# Patient Record
Sex: FEMALE | Race: WHITE | Employment: FULL TIME | URBAN - METROPOLITAN AREA
[De-identification: names, ages, dates, MRNs, and addresses within clinical notes are randomized per-mention and may not be internally consistent; named-entity substitution may affect disease eponyms.]

---

## 2017-05-03 PROBLEM — K35.80 ACUTE APPENDICITIS: Status: ACTIVE | Noted: 2017-05-03

## 2017-05-03 PROBLEM — N12 PYELONEPHRITIS: Status: ACTIVE | Noted: 2017-05-03

## 2017-05-04 PROBLEM — R10.84 GENERALIZED ABDOMINAL PAIN: Status: ACTIVE | Noted: 2017-05-04

## 2017-05-04 PROBLEM — K56.7 ILEUS (HCC): Status: ACTIVE | Noted: 2017-05-04

## 2017-05-04 PROBLEM — N39.0 UTI (URINARY TRACT INFECTION): Status: ACTIVE | Noted: 2017-05-04

## 2017-05-04 PROBLEM — N10 ACUTE PYELONEPHRITIS: Status: ACTIVE | Noted: 2017-05-03

## 2018-09-26 PROBLEM — N39.0 UTI (URINARY TRACT INFECTION): Status: RESOLVED | Noted: 2017-05-04 | Resolved: 2018-09-26

## 2019-01-24 ENCOUNTER — HOSPITAL ENCOUNTER (EMERGENCY)
Age: 29
Discharge: HOME | End: 2019-01-24
Attending: EMERGENCY MEDICINE

## 2019-01-24 VITALS
SYSTOLIC BLOOD PRESSURE: 122 MMHG | BODY MASS INDEX: 26.08 KG/M2 | WEIGHT: 152.78 LBS | TEMPERATURE: 97.6 F | HEIGHT: 64 IN | DIASTOLIC BLOOD PRESSURE: 82 MMHG | OXYGEN SATURATION: 100 % | HEART RATE: 83 BPM | RESPIRATION RATE: 16 BRPM

## 2019-01-24 DIAGNOSIS — A59.9 TRICHOMONAL INFECTION: Primary | ICD-10-CM

## 2019-01-24 DIAGNOSIS — N39.0 ACUTE UTI: ICD-10-CM

## 2019-01-24 LAB
BACTERIA WET PREP: ABNORMAL
BACTERIA: ABNORMAL /HPF
BILIRUBIN URINE: NEGATIVE
BLOOD, URINE: NEGATIVE
CLARITY: CLEAR
CLUE CELLS: ABNORMAL
COLOR: YELLOW
EPITHELIAL CELLS WET PREP: ABNORMAL
EPITHELIAL CELLS, UA: ABNORMAL /HPF
GLUCOSE URINE: NEGATIVE MG/DL
HCG(URINE) PREGNANCY TEST: NEGATIVE
KETONES, URINE: NEGATIVE MG/DL
LEUKOCYTE ESTERASE, URINE: ABNORMAL
MICROSCOPIC EXAMINATION: YES
NITRITE, URINE: NEGATIVE
PH UA: 7.5
PROTEIN UA: NEGATIVE MG/DL
RBC UA: ABNORMAL /HPF (ref 0–2)
RBC WET PREP: ABNORMAL
SOURCE WET PREP: ABNORMAL
SPECIFIC GRAVITY UA: 1.01
TRICHOMONAS PREP: ABNORMAL
TRICHOMONAS: PRESENT /HPF
URINE REFLEX TO CULTURE: YES
URINE TYPE: ABNORMAL
UROBILINOGEN, URINE: 0.2 E.U./DL
WBC UA: ABNORMAL /HPF (ref 0–5)
WBC WET PREP: ABNORMAL
YEAST WET PREP: ABNORMAL

## 2019-01-24 PROCEDURE — 87591 N.GONORRHOEAE DNA AMP PROB: CPT

## 2019-01-24 PROCEDURE — 87210 SMEAR WET MOUNT SALINE/INK: CPT

## 2019-01-24 PROCEDURE — 6370000000 HC RX 637 (ALT 250 FOR IP): Performed by: EMERGENCY MEDICINE

## 2019-01-24 PROCEDURE — 87491 CHLMYD TRACH DNA AMP PROBE: CPT

## 2019-01-24 PROCEDURE — 6360000002 HC RX W HCPCS: Performed by: EMERGENCY MEDICINE

## 2019-01-24 PROCEDURE — 81001 URINALYSIS AUTO W/SCOPE: CPT

## 2019-01-24 PROCEDURE — 99283 EMERGENCY DEPT VISIT LOW MDM: CPT

## 2019-01-24 PROCEDURE — 84703 CHORIONIC GONADOTROPIN ASSAY: CPT

## 2019-01-24 PROCEDURE — 87086 URINE CULTURE/COLONY COUNT: CPT

## 2019-01-24 PROCEDURE — 96372 THER/PROPH/DIAG INJ SC/IM: CPT

## 2019-01-24 RX ORDER — NITROFURANTOIN 25; 75 MG/1; MG/1
100 CAPSULE ORAL 2 TIMES DAILY
Qty: 14 CAPSULE | Refills: 0 | Status: SHIPPED | OUTPATIENT
Start: 2019-01-24 | End: 2019-01-31

## 2019-01-24 RX ORDER — CEFTRIAXONE SODIUM 250 MG/1
250 INJECTION, POWDER, FOR SOLUTION INTRAMUSCULAR; INTRAVENOUS ONCE
Status: COMPLETED | OUTPATIENT
Start: 2019-01-24 | End: 2019-01-24

## 2019-01-24 RX ORDER — AZITHROMYCIN 500 MG/1
1000 TABLET, FILM COATED ORAL ONCE
Status: COMPLETED | OUTPATIENT
Start: 2019-01-24 | End: 2019-01-24

## 2019-01-24 RX ORDER — METRONIDAZOLE 500 MG/1
2000 TABLET ORAL ONCE
Status: COMPLETED | OUTPATIENT
Start: 2019-01-24 | End: 2019-01-24

## 2019-01-24 RX ADMIN — AZITHROMYCIN 1000 MG: 500 TABLET, FILM COATED ORAL at 14:46

## 2019-01-24 RX ADMIN — CEFTRIAXONE SODIUM 250 MG: 250 INJECTION, POWDER, FOR SOLUTION INTRAMUSCULAR; INTRAVENOUS at 14:47

## 2019-01-24 RX ADMIN — METRONIDAZOLE 2000 MG: 500 TABLET ORAL at 15:06

## 2019-01-25 LAB
C TRACH DNA GENITAL QL NAA+PROBE: NEGATIVE
N. GONORRHOEAE DNA: NEGATIVE

## 2019-01-26 LAB — URINE CULTURE, ROUTINE: NORMAL

## 2019-03-15 ENCOUNTER — HOSPITAL ENCOUNTER (EMERGENCY)
Age: 29
Discharge: HOME | End: 2019-03-15
Attending: EMERGENCY MEDICINE

## 2019-03-15 VITALS
OXYGEN SATURATION: 98 % | BODY MASS INDEX: 26.2 KG/M2 | SYSTOLIC BLOOD PRESSURE: 133 MMHG | HEIGHT: 64 IN | DIASTOLIC BLOOD PRESSURE: 88 MMHG | HEART RATE: 85 BPM | TEMPERATURE: 98.1 F | WEIGHT: 153.44 LBS | RESPIRATION RATE: 17 BRPM

## 2019-03-15 DIAGNOSIS — J02.9 VIRAL PHARYNGITIS: Primary | ICD-10-CM

## 2019-03-15 LAB — S PYO AG THROAT QL: NEGATIVE

## 2019-03-15 PROCEDURE — 87081 CULTURE SCREEN ONLY: CPT

## 2019-03-15 PROCEDURE — 99283 EMERGENCY DEPT VISIT LOW MDM: CPT

## 2019-03-15 PROCEDURE — 87880 STREP A ASSAY W/OPTIC: CPT

## 2019-03-15 RX ORDER — IBUPROFEN 600 MG/1
600 TABLET ORAL EVERY 8 HOURS PRN
Qty: 30 TABLET | Refills: 0 | Status: SHIPPED | OUTPATIENT
Start: 2019-03-15 | End: 2021-05-05

## 2019-03-15 ASSESSMENT — PAIN SCALES - GENERAL
PAINLEVEL_OUTOF10: 3
PAINLEVEL_OUTOF10: 3

## 2019-03-15 ASSESSMENT — PAIN DESCRIPTION - DESCRIPTORS
DESCRIPTORS: BURNING
DESCRIPTORS: BURNING

## 2019-03-15 ASSESSMENT — PAIN DESCRIPTION - LOCATION
LOCATION: THROAT
LOCATION: THROAT

## 2019-03-15 ASSESSMENT — PAIN DESCRIPTION - PAIN TYPE
TYPE: ACUTE PAIN
TYPE: ACUTE PAIN

## 2019-03-17 LAB — S PYO THROAT QL CULT: NORMAL

## 2019-04-17 ENCOUNTER — APPOINTMENT (OUTPATIENT)
Dept: ULTRASOUND IMAGING | Age: 29
DRG: 446 | End: 2019-04-17

## 2019-04-17 ENCOUNTER — APPOINTMENT (OUTPATIENT)
Dept: CT IMAGING | Age: 29
DRG: 446 | End: 2019-04-17

## 2019-04-17 ENCOUNTER — HOSPITAL ENCOUNTER (INPATIENT)
Age: 29
LOS: 1 days | Discharge: LEFT BEFORE DISCHARGE | DRG: 446 | End: 2019-04-18
Attending: EMERGENCY MEDICINE | Admitting: INTERNAL MEDICINE

## 2019-04-17 DIAGNOSIS — R10.13 ABDOMINAL PAIN, EPIGASTRIC: Primary | ICD-10-CM

## 2019-04-17 DIAGNOSIS — K80.20 CALCULUS OF GALLBLADDER WITHOUT CHOLECYSTITIS WITHOUT OBSTRUCTION: ICD-10-CM

## 2019-04-17 DIAGNOSIS — R10.11 RIGHT UPPER QUADRANT ABDOMINAL PAIN: ICD-10-CM

## 2019-04-17 DIAGNOSIS — R79.89 ELEVATED LFTS: ICD-10-CM

## 2019-04-17 PROBLEM — B19.20 HEPATITIS C: Status: ACTIVE | Noted: 2019-04-17

## 2019-04-17 PROBLEM — R74.01 TRANSAMINITIS: Status: ACTIVE | Noted: 2019-04-17

## 2019-04-17 PROBLEM — Z20.2 POSSIBLE EXPOSURE TO STD: Status: ACTIVE | Noted: 2019-04-17

## 2019-04-17 LAB
A/G RATIO: 1.2 (ref 1.1–2.2)
ALBUMIN SERPL-MCNC: 3.7 G/DL (ref 3.4–5)
ALP BLD-CCNC: 239 U/L (ref 40–129)
ALT SERPL-CCNC: 494 U/L (ref 10–40)
ANION GAP SERPL CALCULATED.3IONS-SCNC: 9 MMOL/L (ref 3–16)
AST SERPL-CCNC: 283 U/L (ref 15–37)
BACTERIA: ABNORMAL /HPF
BASOPHILS ABSOLUTE: 0 K/UL (ref 0–0.2)
BASOPHILS RELATIVE PERCENT: 0.9 %
BILIRUB SERPL-MCNC: 0.8 MG/DL (ref 0–1)
BILIRUBIN URINE: NEGATIVE
BLOOD, URINE: ABNORMAL
BUN BLDV-MCNC: 9 MG/DL (ref 7–20)
CALCIUM SERPL-MCNC: 9 MG/DL (ref 8.3–10.6)
CHLORIDE BLD-SCNC: 108 MMOL/L (ref 99–110)
CLARITY: CLEAR
CO2: 26 MMOL/L (ref 21–32)
COLOR: YELLOW
CREAT SERPL-MCNC: 0.6 MG/DL (ref 0.6–1.1)
EOSINOPHILS ABSOLUTE: 0 K/UL (ref 0–0.6)
EOSINOPHILS RELATIVE PERCENT: 1.2 %
EPITHELIAL CELLS, UA: ABNORMAL /HPF
GFR AFRICAN AMERICAN: >60
GFR NON-AFRICAN AMERICAN: >60
GLOBULIN: 3.2 G/DL
GLUCOSE BLD-MCNC: 110 MG/DL (ref 70–99)
GLUCOSE URINE: NEGATIVE MG/DL
HCG(URINE) PREGNANCY TEST: NEGATIVE
HCT VFR BLD CALC: 39.8 % (ref 36–48)
HEMOGLOBIN: 13.4 G/DL (ref 12–16)
KETONES, URINE: NEGATIVE MG/DL
LEUKOCYTE ESTERASE, URINE: NEGATIVE
LIPASE: 76 U/L (ref 13–60)
LYMPHOCYTES ABSOLUTE: 1.8 K/UL (ref 1–5.1)
LYMPHOCYTES RELATIVE PERCENT: 44.5 %
MCH RBC QN AUTO: 31 PG (ref 26–34)
MCHC RBC AUTO-ENTMCNC: 33.6 G/DL (ref 31–36)
MCV RBC AUTO: 92.4 FL (ref 80–100)
MICROSCOPIC EXAMINATION: YES
MONOCYTES ABSOLUTE: 0.6 K/UL (ref 0–1.3)
MONOCYTES RELATIVE PERCENT: 15.9 %
NEUTROPHILS ABSOLUTE: 1.5 K/UL (ref 1.7–7.7)
NEUTROPHILS RELATIVE PERCENT: 37.5 %
NITRITE, URINE: NEGATIVE
PDW BLD-RTO: 13.7 % (ref 12.4–15.4)
PH UA: 6.5 (ref 5–8)
PLATELET # BLD: 167 K/UL (ref 135–450)
PMV BLD AUTO: 9.6 FL (ref 5–10.5)
POTASSIUM SERPL-SCNC: 3.9 MMOL/L (ref 3.5–5.1)
PROTEIN UA: NEGATIVE MG/DL
RBC # BLD: 4.31 M/UL (ref 4–5.2)
RBC UA: ABNORMAL /HPF (ref 0–2)
SODIUM BLD-SCNC: 143 MMOL/L (ref 136–145)
SPECIFIC GRAVITY UA: 1.01 (ref 1–1.03)
TOTAL PROTEIN: 6.9 G/DL (ref 6.4–8.2)
URINE REFLEX TO CULTURE: ABNORMAL
URINE TYPE: ABNORMAL
UROBILINOGEN, URINE: 0.2 E.U./DL
WBC # BLD: 3.9 K/UL (ref 4–11)
WBC UA: ABNORMAL /HPF (ref 0–5)

## 2019-04-17 PROCEDURE — 74176 CT ABD & PELVIS W/O CONTRAST: CPT

## 2019-04-17 PROCEDURE — 80074 ACUTE HEPATITIS PANEL: CPT

## 2019-04-17 PROCEDURE — 80053 COMPREHEN METABOLIC PANEL: CPT

## 2019-04-17 PROCEDURE — 76705 ECHO EXAM OF ABDOMEN: CPT

## 2019-04-17 PROCEDURE — 81001 URINALYSIS AUTO W/SCOPE: CPT

## 2019-04-17 PROCEDURE — 2580000003 HC RX 258: Performed by: EMERGENCY MEDICINE

## 2019-04-17 PROCEDURE — 96365 THER/PROPH/DIAG IV INF INIT: CPT

## 2019-04-17 PROCEDURE — 85025 COMPLETE CBC W/AUTO DIFF WBC: CPT

## 2019-04-17 PROCEDURE — 84703 CHORIONIC GONADOTROPIN ASSAY: CPT

## 2019-04-17 PROCEDURE — 1200000000 HC SEMI PRIVATE

## 2019-04-17 PROCEDURE — 99285 EMERGENCY DEPT VISIT HI MDM: CPT

## 2019-04-17 PROCEDURE — 87210 SMEAR WET MOUNT SALINE/INK: CPT

## 2019-04-17 PROCEDURE — 6360000002 HC RX W HCPCS: Performed by: EMERGENCY MEDICINE

## 2019-04-17 PROCEDURE — 87491 CHLMYD TRACH DNA AMP PROBE: CPT

## 2019-04-17 PROCEDURE — 87591 N.GONORRHOEAE DNA AMP PROB: CPT

## 2019-04-17 PROCEDURE — 83690 ASSAY OF LIPASE: CPT

## 2019-04-17 PROCEDURE — 36415 COLL VENOUS BLD VENIPUNCTURE: CPT

## 2019-04-17 PROCEDURE — 96375 TX/PRO/DX INJ NEW DRUG ADDON: CPT

## 2019-04-17 RX ORDER — ACETAMINOPHEN 325 MG/1
650 TABLET ORAL EVERY 4 HOURS PRN
Status: DISCONTINUED | OUTPATIENT
Start: 2019-04-17 | End: 2019-04-18 | Stop reason: HOSPADM

## 2019-04-17 RX ORDER — SODIUM CHLORIDE 9 MG/ML
INJECTION, SOLUTION INTRAVENOUS CONTINUOUS
Status: DISCONTINUED | OUTPATIENT
Start: 2019-04-18 | End: 2019-04-18 | Stop reason: HOSPADM

## 2019-04-17 RX ORDER — ONDANSETRON 2 MG/ML
4 INJECTION INTRAMUSCULAR; INTRAVENOUS EVERY 6 HOURS PRN
Status: DISCONTINUED | OUTPATIENT
Start: 2019-04-17 | End: 2019-04-18 | Stop reason: HOSPADM

## 2019-04-17 RX ORDER — MORPHINE SULFATE 2 MG/ML
2 INJECTION, SOLUTION INTRAMUSCULAR; INTRAVENOUS EVERY 4 HOURS PRN
Status: DISCONTINUED | OUTPATIENT
Start: 2019-04-17 | End: 2019-04-18 | Stop reason: HOSPADM

## 2019-04-17 RX ORDER — SODIUM CHLORIDE 0.9 % (FLUSH) 0.9 %
10 SYRINGE (ML) INJECTION EVERY 12 HOURS SCHEDULED
Status: DISCONTINUED | OUTPATIENT
Start: 2019-04-18 | End: 2019-04-18 | Stop reason: HOSPADM

## 2019-04-17 RX ORDER — SODIUM CHLORIDE 0.9 % (FLUSH) 0.9 %
10 SYRINGE (ML) INJECTION PRN
Status: DISCONTINUED | OUTPATIENT
Start: 2019-04-17 | End: 2019-04-18 | Stop reason: HOSPADM

## 2019-04-17 RX ORDER — KETOROLAC TROMETHAMINE 30 MG/ML
30 INJECTION, SOLUTION INTRAMUSCULAR; INTRAVENOUS ONCE
Status: COMPLETED | OUTPATIENT
Start: 2019-04-17 | End: 2019-04-17

## 2019-04-17 RX ADMIN — KETOROLAC TROMETHAMINE 30 MG: 30 INJECTION, SOLUTION INTRAMUSCULAR at 22:12

## 2019-04-17 RX ADMIN — CEFTRIAXONE 1 G: 1 INJECTION, POWDER, FOR SOLUTION INTRAMUSCULAR; INTRAVENOUS at 22:47

## 2019-04-17 ASSESSMENT — PAIN DESCRIPTION - FREQUENCY
FREQUENCY: CONTINUOUS

## 2019-04-17 ASSESSMENT — PAIN - FUNCTIONAL ASSESSMENT
PAIN_FUNCTIONAL_ASSESSMENT: PREVENTS OR INTERFERES SOME ACTIVE ACTIVITIES AND ADLS
PAIN_FUNCTIONAL_ASSESSMENT: PREVENTS OR INTERFERES WITH MANY ACTIVE NOT PASSIVE ACTIVITIES

## 2019-04-17 ASSESSMENT — PAIN DESCRIPTION - PAIN TYPE
TYPE: ACUTE PAIN

## 2019-04-17 ASSESSMENT — PAIN DESCRIPTION - DESCRIPTORS
DESCRIPTORS: SHARP;CONSTANT
DESCRIPTORS: CONSTANT;SHARP
DESCRIPTORS: SHARP
DESCRIPTORS: SHARP;CONSTANT

## 2019-04-17 ASSESSMENT — PAIN DESCRIPTION - LOCATION
LOCATION: ABDOMEN

## 2019-04-17 ASSESSMENT — PAIN SCALES - GENERAL
PAINLEVEL_OUTOF10: 7
PAINLEVEL_OUTOF10: 7
PAINLEVEL_OUTOF10: 6
PAINLEVEL_OUTOF10: 7
PAINLEVEL_OUTOF10: 6

## 2019-04-17 ASSESSMENT — PAIN DESCRIPTION - ORIENTATION
ORIENTATION: MID;UPPER;RIGHT
ORIENTATION: MID
ORIENTATION: MID;UPPER
ORIENTATION: MID;UPPER

## 2019-04-17 ASSESSMENT — ENCOUNTER SYMPTOMS: ABDOMINAL PAIN: 1

## 2019-04-17 NOTE — ED PROVIDER NOTES
[facility]  eMERGENCY dEPARTMENT eNCOUnter      Pt Name: Huma Woodruff  MRN: [id number]  [place] 1990  Date of evaluation: 2019  Provider: Onofre Conklin MD    [address] [place] [address]       Chief Complaint   Patient presents with    Vaginal Bleeding     vaginal spotting noted only after voiding onset yesterday, denies fever,     Abdominal Pain     onset yesterday         HISTORY OF PRESENT ILLNESS  (Location/Symptom, Timing/Onset, Context/Setting, Quality, Duration, Modifying Factors, Severity.)   Huma Woodruff is a 30 y.o. female who presents to the emergency department complaining of abdominal pain. Her abdominal pain is diffuse, somewhat more in the upper abdomen. She's had it for 2 days. Decreased appetite, no nausea or vomiting. No diarrhea or constipation. She denies frequency urgency or dysuria. She notices blood in the toilet when she urinates and blood when she wipes after urinating. Patient is a  AB 2. Her last normal menstrual period ended one week ago. She denies any abnormal discharge or abnormal vaginal bleeding. Nursing Notes were reviewed and I agree. REVIEW OF SYSTEMS    (2-9 systems for level 4, 10 or more for level 5)     Gen.: No fever or chills. ENT: No sore throat or earache. Cardiovascular: No chest pain. Pulmonary: No shortness of breath or cough. GI: Diffuse abdominal pain. No vomiting or diarrhea. No constipation. : Blood when she wipes and blood in the toilet when she urinates. Denies vaginal discharge. Denies abnormal bleeding. Last menstrual period week ago. Except as noted above the remainder of the review of systems was reviewed and negative. PAST MEDICAL HISTORY         Diagnosis Date    Depression     Genital herpes     ist outbreak    STD (sexually transmitted disease)     GC/chlamydia    Substance abuse ([facility] [address]     HX IV heroin.  last use 2016       SURGICAL HISTORY     History reviewed. No pertinent surgical history. CURRENT MEDICATIONS       Previous Medications    IBUPROFEN (ADVIL;MOTRIN) 600 MG TABLET    Take 1 tablet by mouth every 8 hours as needed for Pain       ALLERGIES     Latex; Kiwi extract; and Hydrocodone    FAMILY HISTORY           Problem Relation Age of Onset    Cancer Maternal Aunt      Family Status   Relation Name Status    Macey          SOCIAL HISTORY      reports that she has been smoking cigarettes. She has been smoking about 1.00 pack per day. She has never used smokeless tobacco. She reports that she does not drink alcohol. PHYSICAL EXAM    (up to 7 for level 4, 8 or more for level 5)     ED Triage Vitals   BP Temp Temp src Pulse Resp SpO2 Height Weight   -- -- -- -- -- -- -- --       Gen. : Alert well-appearing female in no acute distress. Head: Atraumatic and normocephalic. Eyes: No conjunctival injection. Pupils equal round reactive. No icterus. ENT: Soumya Pollen is clear. Oropharynx moist without erythema. Neck: Supple without adenopathy, nontender. Heart: Regular rate and rhythm. No murmurs gallops noted. Lungs: Breath sounds equal bilaterally and clear. Abdomen: Soft, nondistended, minimal periumbilical epigastric tenderness. Mild lower abdominal tenderness poorly localized. No guarding or rebound. No masses organomegaly. No CVA tenderness. Past sounds are normal.  Pelvic exam: External genitalia is normal. Vaginal vault contains a moderate amount of thin reddish discharge. The cervix is nontender. The uterus is not enlarged and is mildly tender. There is mild bilateral adnexal tenderness without masses. She has mild cervical motion tenderness. Musculoskeletal: No extremity edema. Skin: Warm and dry, no cyanosis or diaphoresis. No pallor. Neuro: Awake alert oriented. No focal motor deficits. Normal gait.       DIAGNOSTIC RESULTS     RADIOLOGY:   Non-plain film images such as CT, Ultrasound and MRI are read by the radiologist. [facility] radiographic images are visualized and preliminarily interpreted by Rishi Cross MD with the below findings:      Interpretation per the Radiologist below, if available at the time of this note:    CT ABDOMEN PELVIS WO CONTRAST Additional Contrast? None   Final Result   No CT evidence of acute intra-abdominal process.              LABS:  Labs Reviewed   CBC WITH AUTO DIFFERENTIAL - Abnormal; Notable for the following components:       Result Value    WBC 3.9 (*)     Neutrophils # 1.5 (*)     All other components within normal limits    Narrative:     Performed at:  [place] [id number] [facility]   Phone [phone]   COMPREHENSIVE METABOLIC PANEL - Abnormal; Notable for the following components:    Glucose 110 (*)     Alkaline Phosphatase 239 (*)      (*)      (*)     All other components within normal limits    Narrative:     Performed at:  [place] [address] [facility]   Phone [phone]   LIPASE - Abnormal; Notable for the following components:    Lipase 76.0 (*)     All other components within normal limits    Narrative:     Performed at:  [place] [address] [facility]   Phone [phone]   URINE RT REFLEX TO CULTURE - Abnormal; Notable for the following components:    Blood, Urine MODERATE (*)     All other components within normal limits    Narrative:     Performed at:  [facility]  [address] [facility]   Phone [phone]   MICROSCOPIC URINALYSIS - Abnormal; Notable for the following components:    RBC, UA 3-5 (*)     Bacteria, UA 2+ (*)     All other components within normal limits    Narrative:     Performed at:  [place] [address] [facility]   Phone [phone] WET PREP, GENITAL    Narrative:     Performed at:  [facility]  [address] [facility]   Phone [phone]   C.TRACHOMATIS N.GONORRHOEAE DNA   PREGNANCY, URINE    Narrative:     Performed at:  [address] [place] [address] Laboratory  [address] [facility]   Phone [phone]       All other labs were within normal range or not returned as of this dictation. EMERGENCY DEPARTMENT COURSE and DIFFERENTIAL DIAGNOSIS/MDM:   Vitals:    Vitals:    04/17/19 1741   BP: 117/76   Pulse: 79   Resp: 16   Temp: 97 °F (36.1 °C)   TempSrc: Oral   SpO2: 98%   Weight: 147 lb 7.8 oz (66.9 kg)   Height: 5' 4\" (1.626 m)       Differential includes but is not limited to pyelonephritis, cystitis, ureteral lithiasis, ectopic pregnancy, torsion, pelvic inflammatory disease, gastritis, peptic ulcer disease, biliary tract disease, appendicitis, diverticulitis. The patient has some mild upper abdominal discomfort and tenderness. She has a nonsurgical abdomen. She has significant elevations of her ALT and AST with a normal bilirubin. She has a minimal elevation of her lipase. There is no evidence of cholecystitis or pancreatitis on CT scan. She is not actively vomiting. She did tell me that her gynecologist told her that she had liver problems during her pregnancy. She also states that he diagnosed her with hepatitis recently, but she has no idea what type of hepatitis she was diagnosed, and as far she knows she did not have testing done such as a hepatitis profile. The abdominal discomfort she is having is new. I feel she needs an ultrasound done to rule out biliary tract/gallbladder disease. I'm going to send her to [facility] to have this done. I will also have them send a hepatitis profile. If her gallbladder ultrasound is negative I think she can be discharged for close follow-up.  Her abdomen is benign, nonsurgical. She is going to go by car

## 2019-04-18 ENCOUNTER — APPOINTMENT (OUTPATIENT)
Dept: MRI IMAGING | Age: 29
DRG: 446 | End: 2019-04-18

## 2019-04-18 VITALS
BODY MASS INDEX: 25.29 KG/M2 | SYSTOLIC BLOOD PRESSURE: 114 MMHG | HEIGHT: 64 IN | WEIGHT: 148.15 LBS | HEART RATE: 63 BPM | OXYGEN SATURATION: 97 % | RESPIRATION RATE: 16 BRPM | DIASTOLIC BLOOD PRESSURE: 76 MMHG | TEMPERATURE: 97.5 F

## 2019-04-18 LAB
A/G RATIO: 1.1 (ref 1.1–2.2)
ALBUMIN SERPL-MCNC: 3.8 G/DL (ref 3.4–5)
ALP BLD-CCNC: 262 U/L (ref 40–129)
ALT SERPL-CCNC: 418 U/L (ref 10–40)
ANION GAP SERPL CALCULATED.3IONS-SCNC: 10 MMOL/L (ref 3–16)
AST SERPL-CCNC: 224 U/L (ref 15–37)
ATYPICAL LYMPHOCYTE RELATIVE PERCENT: 1 % (ref 0–6)
BACTERIA WET PREP: ABNORMAL
BANDED NEUTROPHILS RELATIVE PERCENT: 1 % (ref 0–7)
BASOPHILS ABSOLUTE: 0 K/UL (ref 0–0.2)
BASOPHILS RELATIVE PERCENT: 1 %
BILIRUB SERPL-MCNC: 0.7 MG/DL (ref 0–1)
BUN BLDV-MCNC: 8 MG/DL (ref 7–20)
C TRACH DNA GENITAL QL NAA+PROBE: NEGATIVE
CALCIUM SERPL-MCNC: 8.5 MG/DL (ref 8.3–10.6)
CHLORIDE BLD-SCNC: 110 MMOL/L (ref 99–110)
CLUE CELLS: ABNORMAL
CO2: 24 MMOL/L (ref 21–32)
CREAT SERPL-MCNC: 0.5 MG/DL (ref 0.6–1.1)
EOSINOPHILS ABSOLUTE: 0 K/UL (ref 0–0.6)
EOSINOPHILS RELATIVE PERCENT: 1 %
EPITHELIAL CELLS WET PREP: ABNORMAL
GFR AFRICAN AMERICAN: >60
GFR NON-AFRICAN AMERICAN: >60
GLOBULIN: 3.4 G/DL
GLUCOSE BLD-MCNC: 91 MG/DL (ref 70–99)
HAV IGM SER IA-ACNC: REACTIVE
HCT VFR BLD CALC: 42.3 % (ref 36–48)
HEMOGLOBIN: 14 G/DL (ref 12–16)
HEPATITIS B CORE IGM ANTIBODY: ABNORMAL
HEPATITIS B SURFACE ANTIGEN INTERPRETATION: ABNORMAL
HEPATITIS C ANTIBODY INTERPRETATION: REACTIVE
INR BLD: 0.98 (ref 0.86–1.14)
LACTIC ACID: 1.1 MMOL/L (ref 0.4–2)
LIPASE: 43 U/L (ref 13–60)
LYMPHOCYTES ABSOLUTE: 1.8 K/UL (ref 1–5.1)
LYMPHOCYTES RELATIVE PERCENT: 44 %
MCH RBC QN AUTO: 31.3 PG (ref 26–34)
MCHC RBC AUTO-ENTMCNC: 33.2 G/DL (ref 31–36)
MCV RBC AUTO: 94.3 FL (ref 80–100)
MONOCYTES ABSOLUTE: 0.7 K/UL (ref 0–1.3)
MONOCYTES RELATIVE PERCENT: 18 %
N. GONORRHOEAE DNA: NEGATIVE
NEUTROPHILS ABSOLUTE: 1.4 K/UL (ref 1.7–7.7)
NEUTROPHILS RELATIVE PERCENT: 34 %
PDW BLD-RTO: 14.5 % (ref 12.4–15.4)
PLATELET # BLD: 153 K/UL (ref 135–450)
PMV BLD AUTO: 10.1 FL (ref 5–10.5)
POTASSIUM REFLEX MAGNESIUM: 4.1 MMOL/L (ref 3.5–5.1)
PROTHROMBIN TIME: 11.2 SEC (ref 9.8–13)
RBC # BLD: 4.48 M/UL (ref 4–5.2)
RBC WET PREP: ABNORMAL
SODIUM BLD-SCNC: 144 MMOL/L (ref 136–145)
SOURCE WET PREP: ABNORMAL
TOTAL PROTEIN: 7.2 G/DL (ref 6.4–8.2)
TRICHOMONAS PREP: ABNORMAL
TRIGL SERPL-MCNC: 139 MG/DL (ref 0–150)
WBC # BLD: 3.9 K/UL (ref 4–11)
WBC WET PREP: ABNORMAL
YEAST WET PREP: ABNORMAL

## 2019-04-18 PROCEDURE — 36415 COLL VENOUS BLD VENIPUNCTURE: CPT

## 2019-04-18 PROCEDURE — 2500000003 HC RX 250 WO HCPCS: Performed by: NURSE PRACTITIONER

## 2019-04-18 PROCEDURE — 6360000002 HC RX W HCPCS: Performed by: NURSE PRACTITIONER

## 2019-04-18 PROCEDURE — 87522 HEPATITIS C REVRS TRNSCRPJ: CPT

## 2019-04-18 PROCEDURE — 83605 ASSAY OF LACTIC ACID: CPT

## 2019-04-18 PROCEDURE — 74183 MRI ABD W/O CNTR FLWD CNTR: CPT

## 2019-04-18 PROCEDURE — 84478 ASSAY OF TRIGLYCERIDES: CPT

## 2019-04-18 PROCEDURE — 80053 COMPREHEN METABOLIC PANEL: CPT

## 2019-04-18 PROCEDURE — A9577 INJ MULTIHANCE: HCPCS | Performed by: FAMILY MEDICINE

## 2019-04-18 PROCEDURE — 85025 COMPLETE CBC W/AUTO DIFF WBC: CPT

## 2019-04-18 PROCEDURE — 83690 ASSAY OF LIPASE: CPT

## 2019-04-18 PROCEDURE — 6360000004 HC RX CONTRAST MEDICATION: Performed by: FAMILY MEDICINE

## 2019-04-18 PROCEDURE — 2580000003 HC RX 258: Performed by: NURSE PRACTITIONER

## 2019-04-18 PROCEDURE — 6370000000 HC RX 637 (ALT 250 FOR IP): Performed by: FAMILY MEDICINE

## 2019-04-18 PROCEDURE — 85610 PROTHROMBIN TIME: CPT

## 2019-04-18 RX ORDER — METRONIDAZOLE 500 MG/1
500 TABLET ORAL EVERY 12 HOURS SCHEDULED
Status: DISCONTINUED | OUTPATIENT
Start: 2019-04-18 | End: 2019-04-18 | Stop reason: HOSPADM

## 2019-04-18 RX ADMIN — MORPHINE SULFATE 2 MG: 2 INJECTION, SOLUTION INTRAMUSCULAR; INTRAVENOUS at 00:27

## 2019-04-18 RX ADMIN — SODIUM CHLORIDE: 9 INJECTION, SOLUTION INTRAVENOUS at 00:28

## 2019-04-18 RX ADMIN — MORPHINE SULFATE 2 MG: 2 INJECTION, SOLUTION INTRAMUSCULAR; INTRAVENOUS at 08:57

## 2019-04-18 RX ADMIN — GADOBENATE DIMEGLUMINE 13 ML: 529 INJECTION, SOLUTION INTRAVENOUS at 10:19

## 2019-04-18 RX ADMIN — Medication 10 ML: at 08:58

## 2019-04-18 RX ADMIN — SODIUM CHLORIDE: 9 INJECTION, SOLUTION INTRAVENOUS at 11:44

## 2019-04-18 RX ADMIN — FAMOTIDINE 20 MG: 10 INJECTION, SOLUTION INTRAVENOUS at 00:27

## 2019-04-18 RX ADMIN — FAMOTIDINE 20 MG: 10 INJECTION, SOLUTION INTRAVENOUS at 08:57

## 2019-04-18 RX ADMIN — METRONIDAZOLE 500 MG: 500 TABLET ORAL at 11:44

## 2019-04-18 RX ADMIN — MORPHINE SULFATE 2 MG: 2 INJECTION, SOLUTION INTRAMUSCULAR; INTRAVENOUS at 13:24

## 2019-04-18 ASSESSMENT — PAIN DESCRIPTION - DESCRIPTORS: DESCRIPTORS: SHARP

## 2019-04-18 ASSESSMENT — PAIN DESCRIPTION - LOCATION
LOCATION: ABDOMEN
LOCATION: ABDOMEN

## 2019-04-18 ASSESSMENT — PAIN SCALES - GENERAL
PAINLEVEL_OUTOF10: 7
PAINLEVEL_OUTOF10: 6
PAINLEVEL_OUTOF10: 5

## 2019-04-18 ASSESSMENT — PAIN DESCRIPTION - ORIENTATION: ORIENTATION: RIGHT;LEFT;MID

## 2019-04-18 ASSESSMENT — PAIN DESCRIPTION - PAIN TYPE
TYPE: ACUTE PAIN
TYPE: ACUTE PAIN

## 2019-04-18 ASSESSMENT — PAIN DESCRIPTION - FREQUENCY: FREQUENCY: CONTINUOUS

## 2019-04-18 NOTE — ED NOTES
Handoff given to [facility] RN to assume care of pt. Pt pain addressed at this time.       Jac Obrien RN  04/17/19 5751

## 2019-04-18 NOTE — H&P
Hospital Medicine History & Physical      PCP: No primary care provider on file. Date of Admission: 4/17/2019    Date of Service: Pt seen/examined on Placed in Observation. Chief Complaint:  Abdominal pain      History Of Present Illness:      30 y.o. female with PMHx of Depression, STD, substance abuse  IV heroine use, last used about a year ago  presented to [facility] With complaint of right upper quadrant abdominal pain and suprapubic pain for the past 48 hours. She denies any nausea vomiting or fever. She denies any vaginal discharge. She does states she had some vaginal spotting. Patient is noted that she was just treated for an STD  In February and has not had any exposure to that partner. Patient is also noted to been diagnosed with hepatitis C. She was told by her OB/GYN when she had her last child this past May that she was hepatitis C. She's had no RNA genotyping or actively seeking treatment. Patient was noted to be an IV heroin abuser for approximately 10 years on and off. Last use was approximately one year ago. She went through formal rehabilitation. Past Medical History:          Diagnosis Date    Depression     Genital herpes 4/62011    ist outbreak    STD (sexually transmitted disease) 2009    GC/chlamydia    Substance abuse ([facility] [address]     HX IV heroin. last use 9/2016       Past Surgical History:      History reviewed. No pertinent surgical history. Medications Prior to Admission:      Prior to Admission medications    Medication Sig Start Date End Date Taking? Authorizing Provider   ibuprofen (ADVIL;MOTRIN) 600 MG tablet Take 1 tablet by mouth every 8 hours as needed for Pain 3/15/19  Yes Samira Richards MD       Allergies:  Latex; Kiwi extract; and Hydrocodone    Social History:      The patient currently lives With her mother and her 6 children patient is unemployed    TOBACCO:   reports that she has been smoking cigarettes. She has been smoking about 1.00 pack per day.  She has never used smokeless tobacco.  ETOH:   reports that she does not drink alcohol. Family History:      Reviewed in detail positive as follows:        Problem Relation Age of Onset    Cancer Maternal Aunt        REVIEW OF SYSTEMS:   Pertinent positives as noted in the HPI. All other systems reviewed and negative. PHYSICAL EXAM PERFORMED:    /76   Pulse 72   Temp 98.4 °F (36.9 °C) (Oral)   Resp 16   Ht 5' 4\" (1.626 m)   Wt 147 lb 7.8 oz (66.9 kg)   LMP 04/03/2019   SpO2 98%   BMI 25.32 kg/m²     General appearance:  No apparent distress, appears stated age and cooperative. HEENT:  Normal cephalic, atraumatic without obvious deformity. Pupils equal, round, and reactive to light. Extra ocular muscles intact. Conjunctivae/corneas clear. Neck: Supple, with full range of motion. No jugular venous distention. Trachea midline. Respiratory:  Normal respiratory effort. Clear to auscultation, bilaterally without Rales/Wheezes/Rhonchi. Cardiovascular:  Regular rate and rhythm without murmurs, rubs or gallops. Abdomen: Soft, Mild tenderness to the right upper quadrant into the right flank region non-distended, without rebound or guarding. Normal bowel sounds. Musculoskeletal:  No clubbing, cyanosis or edema bilaterally. Full range of motion without deformity. Skin: Skin color, texture, turgor normal.  No rashes or lesions. Neurologic:  Neurovascularly intact without any focal sensory/motor deficits.  Cranial nerves: II-XII intact, grossly non-focal.  Psychiatric:  Alert and oriented, thought content appropriate, normal insight  Capillary Refill: Brisk,< 3 seconds   Peripheral Pulses: +2 palpable, equal bilaterally       Labs:     Recent Labs     04/17/19  1750   WBC 3.9*   HGB 13.4   HCT 39.8        Recent Labs     04/17/19  1750      K 3.9      CO2 26   BUN 9   CREATININE 0.6   CALCIUM 9.0     Recent Labs     04/17/19  1750   *   *   BILITOT 0.8   ALKPHOS 239*

## 2019-04-18 NOTE — CONSULTS
Start Date End Date Taking? Authorizing Provider   ibuprofen (ADVIL;MOTRIN) 600 MG tablet Take 1 tablet by mouth every 8 hours as needed for Pain 3/15/19  Yes Fanny Allen MD    Scheduled Meds:   sodium chloride flush  10 mL Intravenous 2 times per day    enoxaparin  40 mg Subcutaneous Daily    famotidine (PEPCID) injection  20 mg Intravenous BID     Continuous Infusions:   sodium chloride 100 mL/hr at 04/18/19 0028     PRN Meds:.sodium chloride flush, magnesium hydroxide, ondansetron, acetaminophen, morphine    Allergies  is allergic to latex; kiwi extract; and hydrocodone. Family History  Reviewed, non contribtory  family history includes Cancer in her maternal aunt. Social History  Reviewed, non contributory   reports that she has been smoking cigarettes. She has been smoking about 1.00 pack per day. She has never used smokeless tobacco. She reports that she does not drink alcohol. Review of Systems:  General Denies any fever or chills  HEENT Denies any diplopia, tinnitus or vertigo  Resp Denies any shortness of breath, cough or wheezing  Cardiac Denies any chest pain, palpitations, claudication or edema  GI Denies any melena, hematochezia, hematemesis or pyrosis   Denies any frequency, urgency, hesitancy or incontinence  Heme Denies bruising or bleeding easily  Endocrine Denies any history of diabetes or thyroid disease  Neuro Denies any focal motor or sensory deficits    OBJECTIVE:   VITALS:  height is 5' 4\" (1.626 m) and weight is 148 lb 2.4 oz (67.2 kg). Her oral temperature is 97.6 °F (36.4 °C). Her blood pressure is 104/63 and her pulse is 56. Her respiration is 16 and oxygen saturation is 98%. CONSTITUTIONAL: Alert and oriented times 3, no acute distress and cooperative to examination with proper mood and affect. SKIN: Skin color, texture, turgor normal. No rashes or lesions. LYMPH: no cervical nodes, no inguinal nodes  HEENT: Head is normocephalic, atraumatic. EOMI, PERRLA.   NECK: Further recommendations to follow.       Electronically signed by SUSAN Gordon CNP on 4/18/2019 at 8:56 AM

## 2019-04-18 NOTE — PROGRESS NOTES
Pt requesting to leave AMA. MD spoke w/ pt about pt's condition and risks of leaving AMA. Pt understands and still wants to leave AMA. AMA paper signed, witnessed, and placed in chart. Charge nurse notified and aware.

## 2019-04-18 NOTE — ED NOTES
Pt provided with a sandwich and drink and instructed that she will be NPO at midnight.       Marcio Moon RN  04/17/19 8121

## 2019-04-18 NOTE — PLAN OF CARE
Problem: Pain:  Goal: Pain level will decrease  Description  Pain level will decrease  4/18/2019 1048 by Blanca Ocasio RN  Outcome: Ongoing     Problem: Pain:  Goal: Control of acute pain  Description  Control of acute pain  4/18/2019 1048 by Blanca Ocasio RN  Outcome: Ongoing     Problem: Pain:  Goal: Control of chronic pain  Description  Control of chronic pain  4/18/2019 1048 by Blanca Ocasio RN  Outcome: Ongoing

## 2019-04-18 NOTE — CARE COORDINATION
INITIAL CASE MANAGEMENT ASSESSMENT    Reviewed chart, met with patient to assess possible discharge needs. Explained Case Management role/services. Living Situation: Patient lives with her mother and 3 children in a house. She reports her children are safe with her mother while she is admitted. ADLs: Independent     DME: None used    PT/OT Recs: None ordered     Active Services: None     Transportation: Active . Reports she will have transportation at discharge. Medications: She was not taking medications prior to admission. No health coverage and in [place] so not eligible for SVdP. Will likely need GoodRx discount card or lucy meds if on high-cost meds. PCP: None. Local community clinic information given. She was interested in one in [place] as her sister lives there. Provided this information although she may not qualify due to home address in [place]. Patient aware and will follow up. HD/PD: N/A    PLAN/COMMENTS: [place] Medicaid office address and phone number provided to determine if eligible for health benefits. Reports a history of drug use but not active. Did not express need for resources. No needs identified at this time. SW/CM provided contact information for patient or family to call with any questions. SW/CM will follow and assist as needed.       Electronically signed by MAINOR Adams on 4/18/2019 at 11:07 AM

## 2019-04-18 NOTE — PROGRESS NOTES
4 Eyes Skin Assessment     The patient is being assess for  Admission    I agree that 2 RN's have performed a thorough Head to Toe Skin Assessment on the patient. ALL assessment sites listed below have been assessed. Areas assessed by both nurses: ***  [x]   Head, Face, and Ears   [x]   Shoulders, Back, and Chest  [x]   Arms, Elbows, and Hands   [x]   Coccyx, Sacrum, and IschIum  [x]   Legs, Feet, and Heels        Does the Patient have Skin Breakdown?   No         Seth Prevention initiated:  NA   Wound Care Orders initiated:  NA      [facility] nurse consulted for Pressure Injury (Stage 3,4, Unstageable, DTI, NWPT, and Complex wounds), New and Established Ostomies:  NA      Nurse 1 eSignature: Electronically signed by Shy Woodruff RN on 4/18/19 at 1:24 AM    **SHARE this note so that the co-signing nurse is able to place an eSignature**    Nurse 2 eSignature: {Esignature:[id number]}

## 2019-04-18 NOTE — PROGRESS NOTES
Hospitalist Progress Note    CC: RUQ abdominal pain      Admit date: 4/17/2019  Days in hospital:  1    Subjective: Pt S/E. Pt denies nausea and vomiting. Still has RUQ abdominal pain. ROS:   Pertinent items are noted in HPI. Objective:    /63   Pulse 56   Temp 97.6 °F (36.4 °C) (Oral)   Resp 16   Ht 5' 4\" (1.626 m)   Wt 148 lb 2.4 oz (67.2 kg)   LMP 04/03/2019   SpO2 98%   BMI 25.43 kg/m²     Gen: alert, NAD  HEENT: NC/AT, moist mucous membranes, no oropharyngeal erythema or exudate  Neck: supple, trachea midline, no anterior cervical or SC LAD  Heart: Normal s1/s2, RRR, no murmurs, gallops, or rubs. Lungs: clear bilaterally, no wheezing, no rales, no rhonchi, no use of accessory muscles  Abd: bowel sounds present, soft, tender in the RUQ, nondistended, no masses  Extrem: No clubbing, cyanosis, no edema  Skin: no rashes or lesions  Psych: A & O x3, affect appropriate  Neuro: grossly intact, moves all four extremities spontaneously. Cap refill: +2 sec    Medications:  Scheduled Meds:   sodium chloride flush  10 mL Intravenous 2 times per day    enoxaparin  40 mg Subcutaneous Daily    famotidine (PEPCID) injection  20 mg Intravenous BID       PRN Meds:  sodium chloride flush, magnesium hydroxide, ondansetron, acetaminophen, morphine    IV:   sodium chloride 100 mL/hr at 04/18/19 0028       No intake or output data in the 24 hours ending 04/18/19 0852    Results:  CBC:   Recent Labs     04/17/19  1750   WBC 3.9*   HGB 13.4   HCT 39.8   MCV 92.4        BMP:   Recent Labs     04/17/19  1750      K 3.9      CO2 26   BUN 9   CREATININE 0.6     Mag: No results for input(s): MAG in the last 72 hours. Phos: No results found for: PHOS  No components found for: GLU    LIVER PROFILE:   Recent Labs     04/17/19  1750   *   *   LIPASE 76.0*   BILITOT 0.8   ALKPHOS 239*     PT/INR: No results for input(s): PROTIME, INR in the last 72 hours.   APTT: No results for input(s): APTT in the last 72 hours. UA:  Recent Labs     04/17/19  1748   COLORU Yellow   PHUR 6.5   WBCUA 3-5   RBCUA 3-5*   BACTERIA 2+*   CLARITYU Clear   SPECGRAV 1.010   LEUKOCYTESUR Negative   UROBILINOGEN 0.2   BILIRUBINUR Negative   BLOODU MODERATE*   GLUCOSEU Negative       Invalid input(s): ABG  Lab Results   Component Value Date    CALCIUM 9.0 04/17/2019       Assessment:    Principal Problem:    RUQ abdominal pain  Active Problems:    Substance abuse ([address]    Smoker    Hepatitis C    Possible exposure to STD    Transaminitis  Resolved Problems:    * No resolved hospital problems. [facility] course: A 31 yo female with H/O substance abuse and IV heroine use, daignosed with HVC, last used about a year ago, admitted with right upper quadrant abdominal pain and suprapubic pain. In the ED fredy LFTs were elevated. RUQ US showed Cholelithiasis without evidence of cholecystitis, 2.4 cm hypoechoic mass in the liver. Plan:  RUQ abdominal pain  transaminitis   - check MRI liver protocol   - Abdominal CT negative for any intra-abdominal process  - Acute hepatitis panel with Hep A IgM and Hep C Ab  - awaiting HCV quantitative   -Surgical consult  -GI consult  -Pain management  - awaiting chlamydia/ghonorrhea  -Patient did receive a dose of Rocephin in the emergency room.  May need a 10 day course of doxycycline if no etiology is determined     Hepatitis C:  - RNA genotyping ordered     History of substance abuse:  -Previous IV heroin user, clean for one year  - Cautious with pain control   Smoker:  -Provide smoking cessation education    Code status:  full  DVT prophylaxis: [x] Lovenox  [] SQ Heparin  [] SCDs because of  [] warfarin/oral direct thrombin inhibitor [] Encourage ambulation      Disposition:  [] Home [] Rehab [] Psych [] SNF  [] LTAC  [] Transfer to ICU  [] Transfer to PCU [] Other: in pt      Electronically signed by Mark Gil DO on 4/18/2019 at 8:52 AM

## 2019-04-18 NOTE — CONSULTS
GASTROENTEROLOGY INPATIENT CONSULTATION      IDENTIFYING DATA/REASON FOR CONSULTATION   PATIENT:  Marquis Spicer  MRN:  [id number]  ADMIT DATE: 4/17/2019  TIME OF EVALUATION: 4/18/2019 9:34 AM  HOSPITAL STAY:   LOS: 1 day     REASON FOR CONSULTATION:  Elevated LFTs    HISTORY OF PRESENT ILLNESS   Marquis Spicer is a 30 y.o. female with a PMH of substance abuse, HCV, STD, depression who presented on 4/17/2019 with diffuse abdominal pain worse in the epigastrum and RUQ. We have been consulted regarding elevated LFTs    Pain occurring for quite some time but became worse Tuesday night. No associated nausea, vomiting or fevers    Dx with Hep C 1 year ago. Hx of IVDU. Last use was 1 year ago. No Hep C treatment to date. No new meds. Took a few tylenol past week for the pain, no heavy use. No ETOH use. CT abd/pel normal.     RUQ US- Cholelithiasis without evidence of cholecystitis.  No evidence of biliary  Dilatation. 2.4 cm hypoechoic structure which appears to arise from the liver near the  kellie hepatis is indeterminate.  Nonemergent MRI of the abdomen with a liver protocol is  recommended. Acute viral hep panel pending       PAST MEDICAL, SURGICAL, FAMILY, and SOCIAL HISTORY     Past Medical History:   Diagnosis Date    Depression     Genital herpes 4/62011    ist outbreak    STD (sexually transmitted disease) 2009    GC/chlamydia    Substance abuse ([facility] [address]     HX IV heroin. last use 9/2016     History reviewed. No pertinent surgical history.   Family History   Problem Relation Age of Onset    Cancer Maternal Aunt      Social History     Socioeconomic History    Marital status: Single     Spouse name: None    Number of children: None    Years of education: None    Highest education level: None   Occupational History    None   Social Needs    Financial resource strain: None    Food insecurity:     Worry: None     Inability: None    Transportation needs:     Medical: None Non-medical: None   Tobacco Use    Smoking status: Current Every Day Smoker     Packs/day: 1.00     Types: Cigarettes    Smokeless tobacco: Never Used   Substance and Sexual Activity    Alcohol use: No    Drug use: None     Comment: herion  last taken october 2016    Sexual activity: Yes     Partners: Male   Lifestyle    Physical activity:     Days per week: None     Minutes per session: None    Stress: None   Relationships    Social connections:     Talks on phone: None     Gets together: None     Attends Evangelical service: None     Active member of club or organization: None     Attends meetings of clubs or organizations: None     Relationship status: None    Intimate partner violence:     Fear of current or ex partner: None     Emotionally abused: None     Physically abused: None     Forced sexual activity: None   Other Topics Concern    None   Social History Narrative    None       MEDICATIONS   SCHEDULED:    sodium chloride flush 10 mL 2 times per day   enoxaparin 40 mg Daily   famotidine (PEPCID) injection 20 mg BID     FLUIDS/DRIPS:     sodium chloride 100 mL/hr at 04/18/19 0028     PRNs:   sodium chloride flush 10 mL PRN   magnesium hydroxide 30 mL Daily PRN   ondansetron 4 mg Q6H PRN   acetaminophen 650 mg Q4H PRN   morphine 2 mg Q4H PRN     ALLERGIES:  She Allergies   Allergen Reactions    Latex Rash    Kiwi Extract Swelling     Throat swells up    Hydrocodone Itching       REVIEW OF SYSTEMS   Pertinent ROS noted in HPI    PHYSICAL EXAM   [unfilled]   No intake/output data recorded.       Physical Exam:  Gen: Resting in bed, NAD   CV: RRR no MRG   Pul: CTAB   Abd: Good bowel sounds throughout, no scars, soft, NT/ND, no masses, no HSM   Ext: No edema   Neuro: No asterixis   Skin: No jaundice, spider angiomas, dave erythema      LABS AND IMAGING     Recent Results (from the past 24 hour(s))   Urine reflex to culture    Collection Time: 04/17/19  5:48 PM   Result Value Ref Range    Color, UA Yellow Straw/Yellow    Clarity, UA Clear Clear    Glucose, Ur Negative Negative mg/dL    Bilirubin Urine Negative Negative    Ketones, Urine Negative Negative mg/dL    Specific Gravity, UA 1.010 1.005 - 1.030    Blood, Urine MODERATE (A) Negative    pH, UA 6.5 5.0 - 8.0    Protein, UA Negative Negative mg/dL    Urobilinogen, Urine 0.2 <2.0 E.U./dL    Nitrite, Urine Negative Negative    Leukocyte Esterase, Urine Negative Negative    Microscopic Examination YES     Urine Reflex to Culture Not Indicated     Urine Type Voided    Pregnancy, Urine    Collection Time: 04/17/19  5:48 PM   Result Value Ref Range    HCG(Urine) Pregnancy Test Negative Detects HCG level >20 MIU/mL   Microscopic Urinalysis    Collection Time: 04/17/19  5:48 PM   Result Value Ref Range    WBC, UA 3-5 0 - 5 /HPF    RBC, UA 3-5 (A) 0 - 2 /HPF    Epi Cells 5-10 /HPF    Bacteria, UA 2+ (A) /HPF   CBC Auto Differential    Collection Time: 04/17/19  5:50 PM   Result Value Ref Range    WBC 3.9 (L) 4.0 - 11.0 K/uL    RBC 4.31 4.00 - 5.20 M/uL    Hemoglobin 13.4 12.0 - 16.0 g/dL    Hematocrit 39.8 36.0 - 48.0 %    MCV 92.4 80.0 - 100.0 fL    MCH 31.0 26.0 - 34.0 pg    MCHC 33.6 31.0 - 36.0 g/dL    RDW 13.7 12.4 - 15.4 %    Platelets 016 499 - 951 K/uL    MPV 9.6 5.0 - 10.5 fL    Neutrophils % 37.5 %    Lymphocytes % 44.5 %    Monocytes % 15.9 %    Eosinophils % 1.2 %    Basophils % 0.9 %    Neutrophils # 1.5 (L) 1.7 - 7.7 K/uL    Lymphocytes # 1.8 1.0 - 5.1 K/uL    Monocytes # 0.6 0.0 - 1.3 K/uL    Eosinophils # 0.0 0.0 - 0.6 K/uL    Basophils # 0.0 0.0 - 0.2 K/uL   Comprehensive Metabolic Panel    Collection Time: 04/17/19  5:50 PM   Result Value Ref Range    Sodium 143 136 - 145 mmol/L    Potassium 3.9 3.5 - 5.1 mmol/L    Chloride 108 99 - 110 mmol/L    CO2 26 21 - 32 mmol/L    Anion Gap 9 3 - 16    Glucose 110 (H) 70 - 99 mg/dL    BUN 9 7 - 20 mg/dL    CREATININE 0.6 0.6 - 1.1 mg/dL    GFR Non-African American >60 >60    GFR  >60 >60 recommend outpt follow up to discuss eradication therapy. If you have any questions or need any further information, please feel free to contact our consult team.  Thank you for allowing us to participate in the care of Alejandro Ford.     Kori Leach PA-C

## 2019-04-18 NOTE — ED NOTES
Pt off unit at [address].       Charley Metzger RN  04/17/19 2023

## 2019-04-18 NOTE — PROGRESS NOTES
Pharmacy Medication Reconciliation Note     List of medications patient is currently taking is complete. Source of information:   1. Conversation with patient at bedside. 2. Epic records. ALLERGY  Latex; Kiwi extract; and Hydrocodone    Notes regarding home medications:   1. Patient reports taking her home medication today prior to arrival to ED. 2. Patient denies any OTC or herbal medication use.     Vivian Magallanes, Pharmacy Intern  4/17/2019  10:01 PM

## 2019-04-18 NOTE — ED PROVIDER NOTES
[facility]  eMERGENCY dEPARTMENT eNCOUnter        Pt Name: Arnav Nunez  MRN: [id number]  [place] 1990  Date of evaluation: 4/17/2019  Provider: SUSAN Zuluaga CNP  PCP: No primary care provider on file. This patient was seen and evaluated by the attending physician [address]       Chief Complaint   Patient presents with    Vaginal Bleeding     vaginal spotting noted only after voiding onset yesterday, denies fever,     Abdominal Pain     onset yesterday       HISTORY OF PRESENT ILLNESS   (Location/Symptom, Timing/Onset, Context/Setting, Quality, Duration, Modifying Factors, Severity)  Note limiting factors. Arnav Nunez is a 30 y.o. female that presents the ED today from a [facility] ER. She was seen by Dr. Byron Salter earlier at [facility] ED, they do not have ultrasound capabilities at that site and therefore he sent her here to get an ultrasound of her right upper quadrant and acute hepatitis panel drawn as she has elevated LFTs. She was complaining of allover abdominal pain and more concentration of the abdominal pain in the right upper quadrant. She states that she is uncertain if she has a history of hepatitis, was told that she had hepatitis based off of symptoms previously from a primary care provider. She has not followed up with a gastroenterologist.  Upon arrival here she is walking around her room, in no acute distress. No complaints. Right upper quadrant abdominal ultrasound and acute hepatitis panel were ordered upon her arrival to the ED. Nursing Notes were all reviewed and agreed with or any disagreements were addressed  in the HPI. REVIEW OF SYSTEMS    (2-9 systems for level 4, 10 or more for level 5)     Review of Systems   Gastrointestinal: Positive for abdominal pain. All other systems reviewed and are negative. Positives and Pertinent negatives as per HPI.   Except as noted abovein the ROS, all other systems were reviewed and negative. PAST MEDICAL HISTORY     Past Medical History:   Diagnosis Date    Depression     Genital herpes 4/62011    ist outbreak    STD (sexually transmitted disease) 2009    GC/chlamydia    Substance abuse ([facility] [address]     HX IV heroin. last use 9/2016         SURGICAL HISTORY   History reviewed. No pertinent surgical history. CURRENTMEDICATIONS       Previous Medications    IBUPROFEN (ADVIL;MOTRIN) 600 MG TABLET    Take 1 tablet by mouth every 8 hours as needed for Pain         ALLERGIES     Latex;  Kiwi extract; and Hydrocodone    FAMILYHISTORY       Family History   Problem Relation Age of Onset    Cancer Maternal Aunt           SOCIAL HISTORY       Social History     Socioeconomic History    Marital status: Single     Spouse name: None    Number of children: None    Years of education: None    Highest education level: None   Occupational History    None   Social Needs    Financial resource strain: None    Food insecurity:     Worry: None     Inability: None    Transportation needs:     Medical: None     Non-medical: None   Tobacco Use    Smoking status: Current Every Day Smoker     Packs/day: 1.00     Types: Cigarettes    Smokeless tobacco: Never Used   Substance and Sexual Activity    Alcohol use: No    Drug use: None     Comment: herion  last taken october 2016    Sexual activity: Yes     Partners: Male   Lifestyle    Physical activity:     Days per week: None     Minutes per session: None    Stress: None   Relationships    Social connections:     Talks on phone: None     Gets together: None     Attends Scientologist service: None     Active member of club or organization: None     Attends meetings of clubs or organizations: None     Relationship status: None    Intimate partner violence:     Fear of current or ex partner: None     Emotionally abused: None     Physically abused: None     Forced sexual activity: None   Other disoriented. No cranial nerve deficit or sensory deficit. She displays a negative Romberg sign. GCS eye subscore is 4. GCS verbal subscore is 5. GCS motor subscore is 6. Skin: Skin is warm, dry and intact. Capillary refill takes 2 to 3 seconds. No rash noted. She is not diaphoretic. No erythema. No pallor. Psychiatric: She has a normal mood and affect. Her speech is normal and behavior is normal. Judgment and thought content normal. Cognition and memory are normal.   Nursing note and vitals reviewed.       DIAGNOSTIC RESULTS   LABS:    Labs Reviewed   CBC WITH AUTO DIFFERENTIAL - Abnormal; Notable for the following components:       Result Value    WBC 3.9 (*)     Neutrophils # 1.5 (*)     All other components within normal limits    Narrative:     Performed at:  [place] [address] [address] [facility]   Phone [phone]   COMPREHENSIVE METABOLIC PANEL - Abnormal; Notable for the following components:    Glucose 110 (*)     Alkaline Phosphatase 239 (*)      (*)      (*)     All other components within normal limits    Narrative:     Performed at:  [place] [address] [address] [facility]   Phone [phone]   LIPASE - Abnormal; Notable for the following components:    Lipase 76.0 (*)     All other components within normal limits    Narrative:     Performed at:  [place] [address] [address] [facility]   Phone [phone]   URINE RT REFLEX TO CULTURE - Abnormal; Notable for the following components:    Blood, Urine MODERATE (*)     All other components within normal limits    Narrative:     Performed at:  [place] [address] [address] [facility]   Phone [phone]   MICROSCOPIC URINALYSIS - Abnormal; Notable for the following components:    RBC, UA 3-5 (*)     Bacteria, UA 2+ (*) All other components within normal limits    Narrative:     Performed at:  [facility]) - [facility]  [address],  [place]   Phone [phone]   WET PREP, GENITAL    Narrative:     Performed at:  [address] [place] [address]  [address],  [facility]   Phone [phone]   PREGNANCY, URINE    Narrative:     Performed at:  [facility]) - [facility]  [address],  [facility]   Phone [phone]   HEPATITIS PANEL, ACUTE       All other labs were within normal range or not returned as of this dictation. EKG: All EKG's are interpreted by the Emergency Department Physician who either signs orCo-signs this chart in the absence of a cardiologist.  Please see their note for interpretation of EKG. RADIOLOGY:   Non-plain film images such as CT, Ultrasound and MRI are read by the radiologist. Plain radiographic images are visualized andpreliminarily interpreted by the  ED Provider with the below findings:        Interpretation perthe Radiologist below, if available at the time of this note:    [address]   Final Result   Cholelithiasis without evidence of cholecystitis. No evidence of biliary   dilatation. 2.4 cm hypoechoic structure which appears to arise from the liver near the   kellie hepatis is indeterminate. Color flow is demonstrated within this   structure. Nonemergent MRI of the abdomen with a liver protocol is   recommended. CT ABDOMEN PELVIS WO CONTRAST Additional Contrast? None   Final Result   No CT evidence of acute intra-abdominal process.            Ct Abdomen Pelvis Wo Contrast Additional Contrast? None    Result Date: 4/17/2019  EXAMINATION: CT OF THE ABDOMEN AND PELVIS WITHOUT CONTRAST 4/17/2019 6:25 pm TECHNIQUE: CT of the abdomen and pelvis was performed without the administration of intravenous contrast. Multiplanar reformatted images are provided for review. Dose modulation, iterative reconstruction, and/or weight based adjustment of the mA/kV was utilized to reduce the radiation dose to as low as reasonably achievable. COMPARISON: 05/03/2017 HISTORY: ORDERING SYSTEM PROVIDED HISTORY: Upper abd pain TECHNOLOGIST PROVIDED HISTORY: Additional Contrast?->None Ordering Physician Provided Reason for Exam: pt has pain in her entire abd for the last 2 days. denies vomiting or diarrhea Acuity: Acute Type of Exam: Initial Relevant Medical/Surgical History: denies FINDINGS: Lower Chest: Visualized portions of the lower thorax are unremarkable. Organs: Unenhanced liver, spleen, pancreas, adrenal glands, and kidneys are unremarkable. No radiodense gallstones. GI/Bowel: No bowel obstruction. Normal appendix. Pelvis: Urinary bladder and uterus appear unremarkable. Peritoneum/Retroperitoneum: Trace fluid in the pelvis is likely physiologic. No free air or lymphadenopathy. Bones/Soft Tissues: Osseous structures appear unremarkable. No CT evidence of acute intra-abdominal process.          PROCEDURES   Unless otherwise noted below, none     Procedures    CRITICAL CARE TIME   N/A    CONSULTS:  IP CONSULT TO GENERAL SURGERY  IP CONSULT TO GI      EMERGENCY DEPARTMENT COURSE and DIFFERENTIALDIAGNOSIS/MDM:   Vitals:    Vitals:    04/17/19 1741 04/17/19 1900 04/17/19 2215 04/17/19 2246   BP: 117/76 (!) 130/91 135/86 128/76   Pulse: 79 80 60 72   Resp: 16 16 16 16   Temp: 97 °F (36.1 °C) 98.2 °F (36.8 °C) 97.5 °F (36.4 °C) 98.4 °F (36.9 °C)   TempSrc: Oral Oral Oral Oral   SpO2: 98% 100% 98% 98%   Weight: 147 lb 7.8 oz (66.9 kg)      Height: 5' 4\" (1.626 m)          Patient was given thefollowing medications:  Medications   cefTRIAXone (ROCEPHIN) 1 g IVPB in 50 mL D5W minibag (1 g Intravenous New Bag 4/17/19 2247)   ketorolac (TORADOL) injection 30 mg (30 mg Intravenous Given 4/17/19 2212)       MDM: Patient was seen and evaluated per myself in conjunction with ED attending Dr. Ganga Russo. See HPI and above for full presentation and physical exam.  Patient is a 56-year-old female that presents the ED today with complaints of right upper quadrant abdominal pain for the past 2-3 days. She was sent here from [facility] ED which is a freestanding ER that does not have Capabilities. They Did Do Blood Work and a CT of the Abdomen; blood work as above. Blood Work Showed Elevated LFTs. CT of the Abdomen Was Benign. I Did Order a Right Upper Quadrant Ultrasound and Acute Hepatitis Panel Here in the ED. Differential diagnoses include cholecystitis, hepatitis, GERD, cholelithiasis. Ultrasound of the right upper quadrant shows cholelithiasis without evidence of cholecystitis. No evidence of biliary dilation. There is however a 2.4 cm hypo-echoic structure which appears to arise from the liver. Color flow is demonstrated within the structure. Given the above findings I did consult both general surgery and GI. I received a call back from Dr. Sona Ortiz for general surgery and Dr. bean from GI. They agree to follow patient on an inpatient basis. Therefore hospitalist was consult and who agreed to admit patient and write orders for admission. FINAL IMPRESSION      1. Abdominal pain, epigastric    2. Calculus of gallbladder without cholecystitis without obstruction    3. Right upper quadrant abdominal pain    4. Elevated LFTs          DISPOSITION/PLAN   DISPOSITION  04/17/2019 10:37:51 PM      PATIENT REFERREDTO:  Fernando Bustos MD  [address] [facility]    Schedule an appointment as soon as possible for a visit in 3 days  Call for the first available appointment next week, does not have to be with Dr. Sona Ortiz as he will be on vacation. The number above is for the general office.       DISCHARGE MEDICATIONS:  New Prescriptions    No medications on file       DISCONTINUED MEDICATIONS:  Discontinued Medications    BENZOCAINE-MENTHOL (CEPACOL SORE THROAT) 15-2.6 MG LOZG LOZENGE    Take 1 lozenge by mouth every 2 hours as needed for Sore Throat              (Please note that portions ofthis note were completed with a voice recognition program.  Efforts were made to edit the dictations but occasionally words are mis-transcribed.)    SUSAN Cole CNP (electronically signed)            SUSAN Cole CNP  04/17/19 5542

## 2019-04-18 NOTE — ED NOTES
Pt A&O to ED from [facility] ED. Pt states that her abd pain started two days ago. The area in her upper right abd is tender to touch. Pt also reports vaginal spotting and that she ended her menstrual cycle a week ago. Pt denies nausea at this time.       Viviana Lawler RN  04/17/19 5560

## 2019-04-19 NOTE — DISCHARGE SUMMARY
Hospital Medicine Discharge Summary    Patient: Rachel Cherry     Gender: female  : 1990   Age: 30 y.o. MRN: [id number]    Code Status: Prior full    Primary Care Provider: No primary care provider on file. Admit Date: 2019   Discharge Date: 2019      Admitting Physician: Alexandra Sheriff MD  Discharge Physician: Hannah Vera DO     Discharge Diagnoses: Active Hospital Problems    Diagnosis Date Noted    RUQ abdominal pain [R10.11] 2019    Hepatitis C [B19.20] 2019    Possible exposure to STD [Z20.2] 2019    Transaminitis [R74.0] 2019    Smoker [F17.200] 2011    Substance abuse ([facility] [address] [F19.10]        Hospital Course: A 29 yo female with H/O substance abuse and IV heroine use, daignosed with HVC, last used about a year ago, admitted with right upper quadrant abdominal pain and suprapubic pain. In the ED fredy LFTs were elevated. RUQ US showed Cholelithiasis without evidence of cholecystitis, 2.4 cm hypoechoic mass in the liver. the next day the Pt left AMA. She was alert and oriented x4. I explained that we need to keep her in pt to make sure her LFts would normalize but she refused despite the potential consequences of liver failure. Plan:  RUQ abdominal pain, acute hepatitis A   transaminitis   - check MRI liver protocol -   1. Likely kellie hepatis lymph node as measured above, etiology is   nonspecific, possibly reactive.  Consider follow-up contrast-enhanced   abdominal MRI or CT 6 months to reassess. 2. Cholelithiasis as described.  Mild anterior gallbladder wall thickening.    3. Borderline splenomegaly.           - Abdominal CT negative for any intra-abdominal process  - Acute hepatitis panel with Hep A IgM and Hep C Ab  - awaiting HCV quantitative   -GI consulted  -Pain management  -  chlamydia/ghonorrhea -negative     Hepatitis C:  - RNA genotyping ordered     History of substance abuse:  -Previous IV heroin user, clean for one year  - Cautious with pain control   Smoker:  -Provide smoking cessation education    Disposition:  AMA    Exam:     /76   Pulse 63   Temp 97.5 °F (36.4 °C) (Oral)   Resp 16   Ht 5' 4\" (1.626 m)   Wt 148 lb 2.4 oz (67.2 kg)   LMP 04/03/2019   SpO2 97%   BMI 25.43 kg/m²     General appearance:  No apparent distress, appears stated age and cooperative. HEENT:  Normal cephalic, atraumatic without obvious deformity. Pupils equal, round, and reactive to light. Extra ocular muscles intact. Conjunctivae/corneas clear. Neck: Supple, with full range of motion. No jugular venous distention. Trachea midline. Respiratory:  Normal respiratory effort. Clear to auscultation, bilaterally without Rales/Wheezes/Rhonchi. Cardiovascular:  Regular rate and rhythm with normal S1/S2 without murmurs, rubs or gallops. Abdomen: Soft, non-tender, non-distended with normal bowel sounds. Musculoskeletal:  No clubbing, cyanosis or edema bilaterally. Full range of motion without deformity. Skin: Skin color, texture, turgor normal.  No rashes or lesions. Neurologic:  Neurovascularly intact without any focal sensory/motor deficits. Cranial nerves: II-XII intact, grossly non-focal.  Psychiatric:  Alert and oriented, thought content appropriate, normal insight    Consults:     IP CONSULT TO GENERAL SURGERY  IP CONSULT TO GI    Labs:  For convenience and continuity at follow-up the following most recent labs are provided:    Lab Results   Component Value Date    WBC 3.9 04/18/2019    HGB 14.0 04/18/2019    HCT 42.3 04/18/2019    MCV 94.3 04/18/2019     04/18/2019     04/18/2019    K 4.1 04/18/2019     04/18/2019    CO2 24 04/18/2019    BUN 8 04/18/2019    CREATININE 0.5 04/18/2019    CALCIUM 8.5 04/18/2019    ALKPHOS 262 04/18/2019     04/18/2019     04/18/2019    BILITOT 0.7 04/18/2019    LABALBU 3.8 04/18/2019    TRIG 139 04/18/2019     Lab Results   Component Value Date    INR 0.98 04/18/2019 have any questions or concerns please feel free to contact me at [phone].

## 2019-04-22 LAB
HCV QNT BY NAAT IU/ML: ABNORMAL
HCV QNT BY NAAT LOG IU/ML: 3.82 LOG IU/ML
INTERPRETATION: DETECTED

## 2021-01-10 ENCOUNTER — HOSPITAL ENCOUNTER (EMERGENCY)
Age: 31
Discharge: HOME | End: 2021-01-10
Attending: EMERGENCY MEDICINE
Payer: COMMERCIAL

## 2021-01-10 VITALS
OXYGEN SATURATION: 100 % | HEIGHT: 64 IN | HEART RATE: 79 BPM | TEMPERATURE: 98 F | BODY MASS INDEX: 24.54 KG/M2 | RESPIRATION RATE: 18 BRPM | WEIGHT: 143.74 LBS | DIASTOLIC BLOOD PRESSURE: 72 MMHG | SYSTOLIC BLOOD PRESSURE: 123 MMHG

## 2021-01-10 DIAGNOSIS — R10.2 SUPRAPUBIC ABDOMINAL PAIN: Primary | ICD-10-CM

## 2021-01-10 LAB
BILIRUBIN URINE: NEGATIVE
BLOOD, URINE: NEGATIVE
CLARITY: NORMAL
COLOR: YELLOW
GLUCOSE URINE: NEGATIVE MG/DL
HCG(URINE) PREGNANCY TEST: NEGATIVE
KETONES, URINE: NEGATIVE MG/DL
LEUKOCYTE ESTERASE, URINE: NEGATIVE
MICROSCOPIC EXAMINATION: NORMAL
NITRITE, URINE: NEGATIVE
PH UA: 5.5 (ref 5–8)
PROTEIN UA: NEGATIVE MG/DL
SPECIFIC GRAVITY UA: <=1.005 (ref 1–1.03)
URINE TYPE: NORMAL
UROBILINOGEN, URINE: 0.2 E.U./DL

## 2021-01-10 PROCEDURE — 84703 CHORIONIC GONADOTROPIN ASSAY: CPT

## 2021-01-10 PROCEDURE — 81003 URINALYSIS AUTO W/O SCOPE: CPT

## 2021-01-10 PROCEDURE — 99284 EMERGENCY DEPT VISIT MOD MDM: CPT

## 2021-01-10 ASSESSMENT — PAIN DESCRIPTION - ONSET
ONSET: ON-GOING
ONSET: ON-GOING

## 2021-01-10 ASSESSMENT — PAIN DESCRIPTION - PAIN TYPE
TYPE: ACUTE PAIN
TYPE: ACUTE PAIN

## 2021-01-10 ASSESSMENT — PAIN DESCRIPTION - LOCATION: LOCATION: PELVIS;VAGINA

## 2021-01-10 ASSESSMENT — PAIN DESCRIPTION - ORIENTATION: ORIENTATION: LOWER

## 2021-01-10 ASSESSMENT — PAIN DESCRIPTION - PROGRESSION
CLINICAL_PROGRESSION: NOT CHANGED
CLINICAL_PROGRESSION: GRADUALLY WORSENING

## 2021-01-10 ASSESSMENT — PAIN - FUNCTIONAL ASSESSMENT
PAIN_FUNCTIONAL_ASSESSMENT: PREVENTS OR INTERFERES SOME ACTIVE ACTIVITIES AND ADLS
PAIN_FUNCTIONAL_ASSESSMENT: 0-10
PAIN_FUNCTIONAL_ASSESSMENT: PREVENTS OR INTERFERES SOME ACTIVE ACTIVITIES AND ADLS

## 2021-01-10 ASSESSMENT — PAIN DESCRIPTION - DESCRIPTORS
DESCRIPTORS: ACHING;BURNING
DESCRIPTORS: BURNING

## 2021-01-10 ASSESSMENT — PAIN SCALES - GENERAL: PAINLEVEL_OUTOF10: 8

## 2021-01-10 NOTE — ED PROVIDER NOTES
[address] ENCOUNTER      Pt Name: Zain Tang  MRN: [id number]  [place] 1990  Date of evaluation: 1/10/2021  Provider: Hima Salmeron MD    CHIEF COMPLAINT       Chief Complaint   Patient presents with    Dysuria     pain and burning with urination onset yesterday-          HISTORY OF PRESENT ILLNESS   (Location/Symptom, Timing/Onset,Context/Setting, Quality, Duration, Modifying Factors, Severity)  Note limiting factors. Zain Tang is a 35 y.o. female who presents to the emergency department for evaluation of dysuria. Patient reports that since yesterday she has had burning sensation with urination. She does report suprapubic abdominal discomfort as well. She reports some chronic lower abdominal pain worse with menstruation and ovulation, reports she does have this but it is at baseline for her. No new abdominal pain or worsening of her chronic abdominal discomfort. She denies nausea or vomiting. She does report bilateral lower back pain. Denies fever or chills. Denies any vaginal discharge or bleeding. LMP 12/28/2020. NursingNotes were reviewed. REVIEW OF SYSTEMS    (2-9 systems for level 4, 10 or more for level 5)       Constitutional: No fever or chills. Respiratory: No cough, No shortness of breath, No sputum production. Cardiovascular: No chest pain. No palpitations. Gastrointestinal: Suprapubic abdominal pain. No nausea or vomiting  Genitourinary: Dysuria. No hematuria. Immunologic: No malaise. No swollen glands. Musculoskeletal: Bilateral lower back pain. No joint pain. Integumentary: No rash. No abrasions. Neurologic: No headache. No focal numbness or weakness.       PAST MEDICAL HISTORY     Past Medical History:   Diagnosis Date    Depression     Genital herpes 4/62011    ist outbreak    Hepatitis A 04/17/2019    STD (sexually transmitted disease) 2009    GC/chlamydia    Substance abuse ([facility] [address]     HX IV heroin. last use 9/2016         SURGICALHISTORY     History reviewed. No pertinent surgical history.       CURRENT MEDICATIONS       Previous Medications    IBUPROFEN (ADVIL;MOTRIN) 600 MG TABLET    Take 1 tablet by mouth every 8 hours as needed for Pain       ALLERGIES     Latex, Kiwi extract, and Hydrocodone    FAMILY HISTORY       Family History   Problem Relation Age of Onset    Cancer Maternal Aunt           SOCIAL HISTORY       Social History     Socioeconomic History    Marital status: Single     Spouse name: None    Number of children: None    Years of education: None    Highest education level: None   Occupational History    None   Social Needs    Financial resource strain: None    Food insecurity     Worry: None     Inability: None    Transportation needs     Medical: None     Non-medical: None   Tobacco Use    Smoking status: Current Every Day Smoker     Packs/day: 1.00     Types: Cigarettes    Smokeless tobacco: Never Used   Substance and Sexual Activity    Alcohol use: No    Drug use: None     Comment: herion  last taken october 2016    Sexual activity: Yes     Partners: Male   Lifestyle    Physical activity     Days per week: None     Minutes per session: None    Stress: None   Relationships    Social connections     Talks on phone: None     Gets together: None     Attends Confucianist service: None     Active member of club or organization: None     Attends meetings of clubs or organizations: None     Relationship status: None    Intimate partner violence     Fear of current or ex partner: None     Emotionally abused: None     Physically abused: None     Forced sexual activity: None   Other Topics Concern    None   Social History Narrative    None       SCREENINGS    Jane Coma Scale  Eye Opening: Spontaneous  Best Verbal Response: Oriented  Best Motor Response: Obeys commands  [place] Coma Scale Score: 15        PHYSICAL EXAM    (up to 7 for level 4, 8 or more for level 5)     ED Triage Vitals   BP Temp Temp src Pulse Resp SpO2 Height Weight   -- -- -- -- -- -- -- --       General: Alert and oriented, No acute distress. Eye: Normal conjunctiva. Pupils equal and reactive. HENT: Oral mucosa is moist.  Respiratory: Lungs are clear to auscultation, Respirations are non-labored. Cardiovascular: Normal rate, Regular rhythm. Gastrointestinal: Soft, mild suprapubic tenderness to palpation without rebound or guarding, Non-distended. No CVA tenderness. Musculoskeletal: Mild bilateral lower lumbar paraspinal tenderness to palpation, no midline tenderness. Integumentary: Warm, Dry. Neurologic: Alert, Oriented, No focal defects. Psychiatric: Cooperative. DIAGNOSTIC RESULTS     EKG: All EKG's are interpreted by the Emergency Department Physician who either signs or Co-signsthis chart in the absence of a cardiologist.      RADIOLOGY:   [place] such as CT, Ultrasound and MRI are read by the radiologist. Plain radiographic images are visualized and preliminarily interpreted by the emergency physician with the below findings:      Interpretation per the Radiologist below, if available at the time ofthis note:    No orders to display         ED BEDSIDE ULTRASOUND:   Performed by ED Physician - none    LABS:  Labs Reviewed   URINALYSIS    Narrative:     Performed at:  [facility]  [address] [facility]   Phone [phone]   PREGNANCY, URINE    Narrative:     Performed at:  [address] [place] [address]  [address] [facility]   Phone [phone]       All other labs were within normal range or not returned as of this dictation.     EMERGENCY DEPARTMENT COURSE and DIFFERENTIAL DIAGNOSIS/MDM:   Vitals:    Vitals:    01/10/21 1520   BP: 123/72   Pulse: 79   Resp: 18   Temp: 98 °F (36.7 °C)   TempSrc: Oral   SpO2: 100%   Weight: 143 lb 11.8 oz (65.2 kg)   Height: 5' 4\" (1.626 m) time was 0 minutes, excluding separately reportable procedures. There was a high probability of clinically significant/life threatening deterioration in the patient's condition which required my urgent intervention. CONSULTS:  None    PROCEDURES:  Unless otherwise noted below, none         FINAL IMPRESSION      1.  Suprapubic abdominal pain          DISPOSITION/PLAN   DISPOSITION Decision To Discharge 01/10/2021 03:47:36 PM      PATIENT REFERRED TO:  Gab Grimaldo, [address] [id number] [facility]    Schedule an appointment as soon as possible for a visit in 3 days        DISCHARGE MEDICATIONS:  New Prescriptions    No medications on file          (Please note that portions of this note were completed with a voice recognition program.Efforts were made to edit the dictations but occasionally words are mis-transcribed.)    Sol Perez MD (electronically signed)  Attending Emergency Physician        Sol Perez MD  01/10/21 2059

## 2021-01-10 NOTE — ED TRIAGE NOTES
Patient to [facility] ambulatory with complains of pain and burning with urination - onset yesterday states not officially diagnosed with endometriosis- but lower abd pain for one year- has insurance now and will be getting an OB-GYN MD for further evaluation

## 2021-01-12 ENCOUNTER — TELEPHONE (OUTPATIENT)
Dept: GYNECOLOGY | Age: 31
End: 2021-01-12

## 2021-01-14 ENCOUNTER — OFFICE VISIT (OUTPATIENT)
Dept: GYNECOLOGY | Age: 31
End: 2021-01-14
Payer: COMMERCIAL

## 2021-01-14 VITALS
RESPIRATION RATE: 17 BRPM | SYSTOLIC BLOOD PRESSURE: 116 MMHG | HEIGHT: 64 IN | TEMPERATURE: 98.8 F | DIASTOLIC BLOOD PRESSURE: 78 MMHG | WEIGHT: 142 LBS | HEART RATE: 68 BPM | BODY MASS INDEX: 24.24 KG/M2

## 2021-01-14 DIAGNOSIS — Z01.419 WELL WOMAN EXAM WITH ROUTINE GYNECOLOGICAL EXAM: ICD-10-CM

## 2021-01-14 DIAGNOSIS — R10.2 PELVIC PAIN IN FEMALE: Primary | ICD-10-CM

## 2021-01-14 PROCEDURE — 99395 PREV VISIT EST AGE 18-39: CPT | Performed by: OBSTETRICS & GYNECOLOGY

## 2021-01-15 LAB
C TRACH DNA GENITAL QL NAA+PROBE: NEGATIVE
N. GONORRHOEAE DNA: NEGATIVE
URINE CULTURE, ROUTINE: NORMAL

## 2021-01-17 ASSESSMENT — ENCOUNTER SYMPTOMS
EYES NEGATIVE: 1
RESPIRATORY NEGATIVE: 1
GASTROINTESTINAL NEGATIVE: 1

## 2021-01-18 NOTE — PROGRESS NOTES
Subjective:      Patient ID: Kyra Tilley is a 35 y.o. female. Patient is here for annual. Patient with some pelvic pain. Pain appears to go along with ovulation. Pelvic Pain        Review of Systems   Constitutional: Negative. HENT: Negative. Eyes: Negative. Respiratory: Negative. Cardiovascular: Negative. Gastrointestinal: Negative. Genitourinary: Positive for pelvic pain. Musculoskeletal: Negative. Skin: Negative. Neurological: Negative. Psychiatric/Behavioral: Negative. Date of Birth 1990  Past Medical History:   Diagnosis Date    Depression     Genital herpes     ist outbreak    Hepatitis A 2019    STD (sexually transmitted disease)     GC/chlamydia    Substance abuse ([facility] [address]     HX IV heroin. last use 2016     History reviewed. No pertinent surgical history.   OB History    Para Term  AB Living   8 6 4 0 2 4   SAB TAB Ectopic Molar Multiple Live Births   1 1 0   0 4      # Outcome Date GA Lbr Brenden/2nd Weight Sex Delivery Anes PTL Lv   8 Term 11 39w6d 06:10 7 lb 2 oz (3.233 kg) M Vag-Spont   RADHA   7 TAB 10/05/10 12w0d          6 Term 05/06/10 40w0d 05:00 6 lb 11 oz (3.033 kg) F Vag-Spont EPI N RADHA   5 Term 05/15/09 40w0d 07:00 6 lb 12 oz (3.062 kg) F Vag-Spont EPI N RADHA   4 Term 06 40w0d 13:00 6 lb 10 oz (3.005 kg) F Vag-Spont EPI Y RADHA   3 SAB            2 Para            1 Para              Social History     Socioeconomic History    Marital status: Single     Spouse name: Not on file    Number of children: Not on file    Years of education: Not on file    Highest education level: Not on file   Occupational History    Not on file   Social Needs    Financial resource strain: Not on file    Food insecurity     Worry: Not on file     Inability: Not on file    Transportation needs     Medical: Not on file     Non-medical: Not on file   Tobacco Use    Smoking status: Current Every Day Smoker     Packs/day: 1.00     Years: 12.00     Pack years: 12.00     Types: Cigarettes    Smokeless tobacco: Never Used   Substance and Sexual Activity    Alcohol use: No    Drug use: Not on file     Comment: herion  last taken october 2016    Sexual activity: Yes     Partners: Male   Lifestyle    Physical activity     Days per week: Not on file     Minutes per session: Not on file    Stress: Not on file   Relationships    Social connections     Talks on phone: Not on file     Gets together: Not on file     Attends Latter-day service: Not on file     Active member of club or organization: Not on file     Attends meetings of clubs or organizations: Not on file     Relationship status: Not on file    Intimate partner violence     Fear of current or ex partner: Not on file     Emotionally abused: Not on file     Physically abused: Not on file     Forced sexual activity: Not on file   Other Topics Concern    Not on file   Social History Narrative    Not on file     Allergies   Allergen Reactions    Latex Rash    Kiwi Extract Swelling     Throat swells up    Hydrocodone Itching     Outpatient Medications Marked as Taking for the 1/14/21 encounter (Office Visit) with Ayan Gutierrez MD   Medication Sig Dispense Refill    ibuprofen (ADVIL;MOTRIN) 600 MG tablet Take 1 tablet by mouth every 8 hours as needed for Pain 30 tablet 0     Family History   Problem Relation Age of Onset    Cancer Maternal Aunt      /78 (Site: Right Upper Arm, Position: Sitting, Cuff Size: Medium Adult)   Pulse 68   Temp 98.8 °F (37.1 °C) (Oral)   Resp 17   Ht 5' 4\" (1.626 m)   Wt 142 lb (64.4 kg)   LMP 12/28/2020   Breastfeeding No   BMI 24.37 kg/m²       Objective:   Physical Exam  Constitutional:       Appearance: Normal appearance. She is well-developed and normal weight. HENT:      Head: Normocephalic. Nose: Nose normal.      Mouth/Throat:      Mouth: Mucous membranes are moist.      Pharynx: Oropharynx is clear.    Eyes: Pupils: Pupils are equal, round, and reactive to light. Neck:      Musculoskeletal: Normal range of motion and neck supple. No neck rigidity. Thyroid: No thyromegaly. Cardiovascular:      Rate and Rhythm: Normal rate and regular rhythm. Pulses: Normal pulses. Heart sounds: Normal heart sounds. No murmur. No friction rub. No gallop. Pulmonary:      Effort: Pulmonary effort is normal. No respiratory distress. Breath sounds: Normal breath sounds. No stridor. No wheezing, rhonchi or rales. Chest:      Chest wall: No tenderness. Breasts:         Right: Normal. No swelling, bleeding, inverted nipple, mass, nipple discharge, skin change or tenderness. Left: Normal. No swelling, bleeding, inverted nipple, mass, nipple discharge, skin change or tenderness. Abdominal:      General: Bowel sounds are normal. There is no distension. Palpations: Abdomen is soft. There is no mass. Tenderness: There is no abdominal tenderness. There is no guarding or rebound. Hernia: No hernia is present. There is no hernia in the left inguinal area. Genitourinary:     General: Normal vulva. Exam position: Lithotomy position. Pubic Area: No rash. Labia:         Right: No rash, tenderness, lesion or injury. Left: No rash, tenderness, lesion or injury. Urethra: No prolapse, urethral pain, urethral swelling or urethral lesion. Vagina: No signs of injury and foreign body. No vaginal discharge, erythema, tenderness, bleeding, lesions or prolapsed vaginal walls. Cervix: No cervical motion tenderness, discharge, friability, lesion, erythema, cervical bleeding or eversion. Uterus: Not deviated, not enlarged, not fixed and not tender. Adnexa:         Right: No mass, tenderness or fullness. Left: No mass, tenderness or fullness. Rectum: No anal fissure or external hemorrhoid.       Comments: Normal urethral meatus, normal urethra, nl bladder  Musculoskeletal: Normal range of motion. General: No tenderness. Lymphadenopathy:      Cervical: No cervical adenopathy. Lower Body: No right inguinal adenopathy. No left inguinal adenopathy. Skin:     General: Skin is warm and dry. Coloration: Skin is not pale. Findings: No erythema or rash. Neurological:      General: No focal deficit present. Mental Status: She is alert and oriented to person, place, and time. Mental status is at baseline. Deep Tendon Reflexes: Reflexes are normal and symmetric. Psychiatric:         Mood and Affect: Mood normal.         Behavior: Behavior normal.         Thought Content: Thought content normal.         Judgment: Judgment normal.         Assessment:      1. Annual  2. Pelvic pain      Plan:      1. Pap, calcium, exercise  2. Pelvic US, UC, DNA probe. Plans per results of US.         Christian Galvez MD

## 2021-01-20 ENCOUNTER — HOSPITAL ENCOUNTER (OUTPATIENT)
Dept: ULTRASOUND IMAGING | Age: 31
Discharge: HOME | End: 2021-01-20
Payer: COMMERCIAL

## 2021-01-20 DIAGNOSIS — R10.2 PELVIC PAIN IN FEMALE: ICD-10-CM

## 2021-01-20 PROCEDURE — 76856 US EXAM PELVIC COMPLETE: CPT

## 2021-01-20 PROCEDURE — 76830 TRANSVAGINAL US NON-OB: CPT

## 2021-01-28 ENCOUNTER — TELEPHONE (OUTPATIENT)
Dept: GYNECOLOGY | Age: 31
End: 2021-01-28

## 2021-01-28 NOTE — TELEPHONE ENCOUNTER
Patient is wondering the date for her upcoming surgery. I don't see anything scheduled. She was told that she would get a call and would need to have a covid test before hand.  Please return patient's call at [phone]

## 2021-02-02 NOTE — PROGRESS NOTES
No pre-admission testing orders in [facility] yet.  I will reach out to Sergio Fletcher from the office to notify but will turn chart in for the nurses to do interview since they are on the charts for the 10th

## 2021-02-04 ENCOUNTER — TELEPHONE (OUTPATIENT)
Dept: GYNECOLOGY | Age: 31
End: 2021-02-04

## 2021-02-04 RX ORDER — CETIRIZINE HYDROCHLORIDE 10 MG/1
10 TABLET ORAL DAILY
COMMUNITY

## 2021-02-04 NOTE — TELEPHONE ENCOUNTER
Called and spoke to patient, patient says she has a facial dermal piercing that is under her left eye. Patient says the piercing has to be surgical removed so its not easy to remove.

## 2021-02-04 NOTE — TELEPHONE ENCOUNTER
Called and spoke to patient, related message to patient to keep her piercing in and they will put tape over it during the  procedure.

## 2021-02-04 NOTE — PROGRESS NOTES
C-Difficile admission screening and protocol:     * Admitted with diarrhea? YES____    NO__X___     *Prior history of C-Diff. In last 3 months? YES____   NO_X__     *Antibiotic use in the past 6-8 weeks? NO___X___YES______                 If yes which  ANTIBIOTIC AND REASON______     *Prior hospitalization or nursing home in the last month?  YES____   NO__X__

## 2021-02-04 NOTE — PROGRESS NOTES

## 2021-02-04 NOTE — PROGRESS NOTES
[address] [facility] time__1025__________        Surgery time___1225_________    Take the following medications with a sip of water: Follow your MD/Surgeons pre-procedure instructions regarding your medications    Do not eat or drink anything after 12:00 midnight prior to your surgery. This includes water chewing gum, mints and ice chips. You may brush your teeth and gargle the morning of your surgery, but do not swallow the water     Please see your family doctor/pediatrician for a history and physical and/or concerning medications. Bring any test results/reports from your physicians office. If you are under the care of a heart doctor or specialist doctor, please be aware that you may be asked to them for clearance    You may be asked to stop blood thinners such as Coumadin, Plavix, Fragmin, Lovenox, etc., or any anti-inflammatories such as:  Aspirin, Ibuprofen, Advil, Naproxen prior to your surgery. We also ask that you stop any OTC medications such as fish oil, vitamin E, glucosamine, garlic, Multivitamins, COQ 10, etc.    We ask that you do not smoke 24 hours prior to surgery  We ask that you do not  drink any alcoholic beverages 24 hours prior to surgery     You must make arrangements for a responsible adult to take you home after your surgery. For your safety you will not be allowed to leave alone or drive yourself home. Your surgery will be cancelled if you do not have a ride home. Also for your safety, it is strongly suggested that someone stay with you the first 24 hours after your surgery. A parent or legal guardian must accompany a child scheduled for surgery and plan to stay at the hospital until the child is discharged. Please do not bring other children with you. For your comfort, please wear simple loose fitting clothing to the hospital.  Please do not bring valuables.     Do not wear any make-up or nail polish on your fingers or toes      For your safety, please do not wear any jewelry or body piercing's on the day of surgery. All jewelry must be removed. If you have dentures, they will be removed before going to operating room. For your convenience, we will provide you with a container. If you wear contact lenses or glasses, they will be removed, please bring a case for them. If you have a living will and a durable power of  for healthcare, please bring in a copy. As part of our patient safety program to minimize surgical site infections, we ask you to do the following:    · Please notify your surgeon if you develop any illness between         now and the  day of your surgery. · This includes a cough, cold, fever, sore throat, nausea,         or vomiting, and diarrhea, etc.  ·  Please notify your surgeon if you experience dizziness, shortness         of breath or blurred vision between now and the time of your surgery. Do not shave your operative site 96 hours prior to surgery. For face and neck surgery, men may use an electric razor 48 hours   prior to surgery. You may shower the night before surgery or the morning of   your surgery with an antibacterial soap. You will need to bring a photo ID and insurance card    [facility] has an onsite pharmacy, would you like to utilize our pharmacy     If you will be staying overnight and use a C-pap machine, please bring   your C-pap to hospital     Our goal is to provide you with excellent care, therefore, visitors will be limited to two(2) in the room at a time so that we may focus on providing this care for you. Please contact pre-admission testing if you have any further questions. [facility] phone number:  [phone] PAT fax number:  [phone]  Please note these are generalized instructions for all surgical cases, you may be provided with more specific instructions according to your surgery.

## 2021-02-04 NOTE — TELEPHONE ENCOUNTER
Patient has upcoming surgery on 2/10/2021. Has question regarded implanted face piercing. Wondering if it needs to be removed.  Please contact patient at [phone]

## 2021-02-05 ENCOUNTER — OFFICE VISIT (OUTPATIENT)
Dept: PRIMARY CARE CLINIC | Age: 31
End: 2021-02-05
Payer: COMMERCIAL

## 2021-02-05 DIAGNOSIS — Z01.812 PRE-PROCEDURAL LABORATORY EXAMINATIONS: Primary | ICD-10-CM

## 2021-02-05 LAB — SARS-COV-2: NOT DETECTED

## 2021-02-05 PROCEDURE — 99211 OFF/OP EST MAY X REQ PHY/QHP: CPT | Performed by: NURSE PRACTITIONER

## 2021-02-05 NOTE — PROGRESS NOTES
Patient presented to [facility] drive up clinic for preop testing. Patient was swabbed and given information advising them to remain isolated until procedure date.

## 2021-02-06 ENCOUNTER — PREP FOR PROCEDURE (OUTPATIENT)
Dept: GYNECOLOGY | Age: 31
End: 2021-02-06

## 2021-02-06 RX ORDER — SODIUM CHLORIDE, SODIUM LACTATE, POTASSIUM CHLORIDE, CALCIUM CHLORIDE 600; 310; 30; 20 MG/100ML; MG/100ML; MG/100ML; MG/100ML
INJECTION, SOLUTION INTRAVENOUS CONTINUOUS
Status: CANCELLED | OUTPATIENT
Start: 2021-02-06

## 2021-02-06 RX ORDER — SODIUM CHLORIDE 0.9 % (FLUSH) 0.9 %
10 SYRINGE (ML) INJECTION PRN
Status: CANCELLED | OUTPATIENT
Start: 2021-02-06

## 2021-02-06 RX ORDER — SODIUM CHLORIDE 0.9 % (FLUSH) 0.9 %
10 SYRINGE (ML) INJECTION EVERY 12 HOURS SCHEDULED
Status: CANCELLED | OUTPATIENT
Start: 2021-02-06

## 2021-02-09 ENCOUNTER — ANESTHESIA EVENT (OUTPATIENT)
Dept: OPERATING ROOM | Age: 31
End: 2021-02-09
Payer: COMMERCIAL

## 2021-02-10 ENCOUNTER — ANESTHESIA (OUTPATIENT)
Dept: OPERATING ROOM | Age: 31
End: 2021-02-10
Payer: COMMERCIAL

## 2021-02-10 ENCOUNTER — HOSPITAL ENCOUNTER (OUTPATIENT)
Age: 31
Setting detail: OUTPATIENT SURGERY
Discharge: HOME | End: 2021-02-10
Attending: OBSTETRICS & GYNECOLOGY | Admitting: OBSTETRICS & GYNECOLOGY
Payer: COMMERCIAL

## 2021-02-10 VITALS
DIASTOLIC BLOOD PRESSURE: 59 MMHG | TEMPERATURE: 95.9 F | RESPIRATION RATE: 3 BRPM | OXYGEN SATURATION: 98 % | SYSTOLIC BLOOD PRESSURE: 97 MMHG

## 2021-02-10 VITALS
BODY MASS INDEX: 24.01 KG/M2 | WEIGHT: 140.65 LBS | OXYGEN SATURATION: 98 % | HEIGHT: 64 IN | SYSTOLIC BLOOD PRESSURE: 118 MMHG | HEART RATE: 71 BPM | TEMPERATURE: 97 F | DIASTOLIC BLOOD PRESSURE: 79 MMHG | RESPIRATION RATE: 16 BRPM

## 2021-02-10 DIAGNOSIS — Z98.890 S/P LAPAROSCOPY: Primary | ICD-10-CM

## 2021-02-10 DIAGNOSIS — N94.6 DYSMENORRHEA: ICD-10-CM

## 2021-02-10 LAB
A/G RATIO: 1.6 (ref 1.1–2.2)
ABO/RH: NORMAL
ALBUMIN SERPL-MCNC: 4.5 G/DL (ref 3.4–5)
ALP BLD-CCNC: 52 U/L (ref 40–129)
ALT SERPL-CCNC: 51 U/L (ref 10–40)
ANION GAP SERPL CALCULATED.3IONS-SCNC: 6 MMOL/L (ref 3–16)
ANTIBODY SCREEN: NORMAL
AST SERPL-CCNC: 45 U/L (ref 15–37)
BILIRUB SERPL-MCNC: 1.1 MG/DL (ref 0–1)
BUN BLDV-MCNC: 20 MG/DL (ref 7–20)
CALCIUM SERPL-MCNC: 9 MG/DL (ref 8.3–10.6)
CHLORIDE BLD-SCNC: 100 MMOL/L (ref 99–110)
CO2: 25 MMOL/L (ref 21–32)
CREAT SERPL-MCNC: 0.6 MG/DL (ref 0.6–1.1)
EKG ATRIAL RATE: 61 BPM
EKG DIAGNOSIS: NORMAL
EKG P AXIS: 65 DEGREES
EKG P-R INTERVAL: 156 MS
EKG Q-T INTERVAL: 424 MS
EKG QRS DURATION: 98 MS
EKG QTC CALCULATION (BAZETT): 426 MS
EKG R AXIS: 43 DEGREES
EKG T AXIS: 48 DEGREES
EKG VENTRICULAR RATE: 61 BPM
GFR AFRICAN AMERICAN: >60
GFR NON-AFRICAN AMERICAN: >60
GLOBULIN: 2.9 G/DL
GLUCOSE BLD-MCNC: 80 MG/DL (ref 70–99)
HCT VFR BLD CALC: 41.6 % (ref 36–48)
HEMOGLOBIN: 14.4 G/DL (ref 12–16)
MCH RBC QN AUTO: 31.4 PG (ref 26–34)
MCHC RBC AUTO-ENTMCNC: 34.7 G/DL (ref 31–36)
MCV RBC AUTO: 90.4 FL (ref 80–100)
PDW BLD-RTO: 12.8 % (ref 12.4–15.4)
PLATELET # BLD: 164 K/UL (ref 135–450)
PMV BLD AUTO: 9.9 FL (ref 5–10.5)
POTASSIUM REFLEX MAGNESIUM: 4 MMOL/L (ref 3.5–5.1)
PREGNANCY, URINE: NEGATIVE
RBC # BLD: 4.6 M/UL (ref 4–5.2)
SODIUM BLD-SCNC: 131 MMOL/L (ref 136–145)
TOTAL PROTEIN: 7.4 G/DL (ref 6.4–8.2)
WBC # BLD: 5.3 K/UL (ref 4–11)

## 2021-02-10 PROCEDURE — 2500000003 HC RX 250 WO HCPCS: Performed by: NURSE ANESTHETIST, CERTIFIED REGISTERED

## 2021-02-10 PROCEDURE — 93010 ELECTROCARDIOGRAM REPORT: CPT | Performed by: INTERNAL MEDICINE

## 2021-02-10 PROCEDURE — 7100000010 HC PHASE II RECOVERY - FIRST 15 MIN: Performed by: OBSTETRICS & GYNECOLOGY

## 2021-02-10 PROCEDURE — 6360000002 HC RX W HCPCS: Performed by: NURSE ANESTHETIST, CERTIFIED REGISTERED

## 2021-02-10 PROCEDURE — 2500000003 HC RX 250 WO HCPCS: Performed by: OBSTETRICS & GYNECOLOGY

## 2021-02-10 PROCEDURE — 3600000014 HC SURGERY LEVEL 4 ADDTL 15MIN: Performed by: OBSTETRICS & GYNECOLOGY

## 2021-02-10 PROCEDURE — 6360000002 HC RX W HCPCS: Performed by: ANESTHESIOLOGY

## 2021-02-10 PROCEDURE — 7100000001 HC PACU RECOVERY - ADDTL 15 MIN: Performed by: OBSTETRICS & GYNECOLOGY

## 2021-02-10 PROCEDURE — 2709999900 HC NON-CHARGEABLE SUPPLY: Performed by: OBSTETRICS & GYNECOLOGY

## 2021-02-10 PROCEDURE — 86900 BLOOD TYPING SEROLOGIC ABO: CPT

## 2021-02-10 PROCEDURE — 80053 COMPREHEN METABOLIC PANEL: CPT

## 2021-02-10 PROCEDURE — 2720000010 HC SURG SUPPLY STERILE: Performed by: OBSTETRICS & GYNECOLOGY

## 2021-02-10 PROCEDURE — 58662 LAPAROSCOPY EXCISE LESIONS: CPT | Performed by: OBSTETRICS & GYNECOLOGY

## 2021-02-10 PROCEDURE — 6360000002 HC RX W HCPCS

## 2021-02-10 PROCEDURE — 3700000000 HC ANESTHESIA ATTENDED CARE: Performed by: OBSTETRICS & GYNECOLOGY

## 2021-02-10 PROCEDURE — 3700000001 HC ADD 15 MINUTES (ANESTHESIA): Performed by: OBSTETRICS & GYNECOLOGY

## 2021-02-10 PROCEDURE — 3600000004 HC SURGERY LEVEL 4 BASE: Performed by: OBSTETRICS & GYNECOLOGY

## 2021-02-10 PROCEDURE — 2580000003 HC RX 258: Performed by: OBSTETRICS & GYNECOLOGY

## 2021-02-10 PROCEDURE — 2580000003 HC RX 258: Performed by: NURSE ANESTHETIST, CERTIFIED REGISTERED

## 2021-02-10 PROCEDURE — 86850 RBC ANTIBODY SCREEN: CPT

## 2021-02-10 PROCEDURE — 7100000000 HC PACU RECOVERY - FIRST 15 MIN: Performed by: OBSTETRICS & GYNECOLOGY

## 2021-02-10 PROCEDURE — 6370000000 HC RX 637 (ALT 250 FOR IP): Performed by: ANESTHESIOLOGY

## 2021-02-10 PROCEDURE — 7100000011 HC PHASE II RECOVERY - ADDTL 15 MIN: Performed by: OBSTETRICS & GYNECOLOGY

## 2021-02-10 PROCEDURE — 93005 ELECTROCARDIOGRAM TRACING: CPT | Performed by: OBSTETRICS & GYNECOLOGY

## 2021-02-10 PROCEDURE — 88304 TISSUE EXAM BY PATHOLOGIST: CPT

## 2021-02-10 PROCEDURE — 85027 COMPLETE CBC AUTOMATED: CPT

## 2021-02-10 PROCEDURE — 6360000002 HC RX W HCPCS: Performed by: OBSTETRICS & GYNECOLOGY

## 2021-02-10 PROCEDURE — 84703 CHORIONIC GONADOTROPIN ASSAY: CPT

## 2021-02-10 PROCEDURE — 2500000003 HC RX 250 WO HCPCS

## 2021-02-10 PROCEDURE — 86901 BLOOD TYPING SEROLOGIC RH(D): CPT

## 2021-02-10 RX ORDER — SODIUM CHLORIDE, SODIUM LACTATE, POTASSIUM CHLORIDE, CALCIUM CHLORIDE 600; 310; 30; 20 MG/100ML; MG/100ML; MG/100ML; MG/100ML
INJECTION, SOLUTION INTRAVENOUS CONTINUOUS
Status: DISCONTINUED | OUTPATIENT
Start: 2021-02-10 | End: 2021-02-10 | Stop reason: HOSPADM

## 2021-02-10 RX ORDER — ONDANSETRON 4 MG/1
4 TABLET, FILM COATED ORAL EVERY 8 HOURS PRN
Qty: 30 TABLET | Refills: 0 | Status: SHIPPED | OUTPATIENT
Start: 2021-02-10 | End: 2022-01-14

## 2021-02-10 RX ORDER — ONDANSETRON 2 MG/ML
4 INJECTION INTRAMUSCULAR; INTRAVENOUS
Status: DISCONTINUED | OUTPATIENT
Start: 2021-02-10 | End: 2021-02-10 | Stop reason: HOSPADM

## 2021-02-10 RX ORDER — BUPIVACAINE HYDROCHLORIDE 2.5 MG/ML
INJECTION, SOLUTION EPIDURAL; INFILTRATION; INTRACAUDAL
Status: COMPLETED | OUTPATIENT
Start: 2021-02-10 | End: 2021-02-10

## 2021-02-10 RX ORDER — SODIUM CHLORIDE 0.9 % (FLUSH) 0.9 %
10 SYRINGE (ML) INJECTION EVERY 12 HOURS SCHEDULED
Status: DISCONTINUED | OUTPATIENT
Start: 2021-02-10 | End: 2021-02-10 | Stop reason: HOSPADM

## 2021-02-10 RX ORDER — MEPERIDINE HYDROCHLORIDE 25 MG/ML
12.5 INJECTION INTRAMUSCULAR; INTRAVENOUS; SUBCUTANEOUS EVERY 5 MIN PRN
Status: DISCONTINUED | OUTPATIENT
Start: 2021-02-10 | End: 2021-02-10 | Stop reason: HOSPADM

## 2021-02-10 RX ORDER — MIDAZOLAM HYDROCHLORIDE 1 MG/ML
INJECTION INTRAMUSCULAR; INTRAVENOUS PRN
Status: DISCONTINUED | OUTPATIENT
Start: 2021-02-10 | End: 2021-02-10 | Stop reason: SDUPTHER

## 2021-02-10 RX ORDER — OXYCODONE HYDROCHLORIDE 5 MG/1
5 TABLET ORAL PRN
Status: COMPLETED | OUTPATIENT
Start: 2021-02-10 | End: 2021-02-10

## 2021-02-10 RX ORDER — FENTANYL CITRATE 50 UG/ML
INJECTION, SOLUTION INTRAMUSCULAR; INTRAVENOUS PRN
Status: DISCONTINUED | OUTPATIENT
Start: 2021-02-10 | End: 2021-02-10 | Stop reason: SDUPTHER

## 2021-02-10 RX ORDER — FENTANYL CITRATE 50 UG/ML
50 INJECTION, SOLUTION INTRAMUSCULAR; INTRAVENOUS EVERY 5 MIN PRN
Status: COMPLETED | OUTPATIENT
Start: 2021-02-10 | End: 2021-02-10

## 2021-02-10 RX ORDER — PROPOFOL 10 MG/ML
INJECTION, EMULSION INTRAVENOUS PRN
Status: DISCONTINUED | OUTPATIENT
Start: 2021-02-10 | End: 2021-02-10 | Stop reason: SDUPTHER

## 2021-02-10 RX ORDER — OXYCODONE HYDROCHLORIDE 10 MG/1
10 TABLET ORAL PRN
Status: COMPLETED | OUTPATIENT
Start: 2021-02-10 | End: 2021-02-10

## 2021-02-10 RX ORDER — SODIUM CHLORIDE 9 MG/ML
INJECTION, SOLUTION INTRAVENOUS CONTINUOUS PRN
Status: DISCONTINUED | OUTPATIENT
Start: 2021-02-10 | End: 2021-02-10 | Stop reason: SDUPTHER

## 2021-02-10 RX ORDER — MORPHINE SULFATE 2 MG/ML
1 INJECTION, SOLUTION INTRAMUSCULAR; INTRAVENOUS EVERY 5 MIN PRN
Status: DISCONTINUED | OUTPATIENT
Start: 2021-02-10 | End: 2021-02-10 | Stop reason: HOSPADM

## 2021-02-10 RX ORDER — DEXAMETHASONE SODIUM PHOSPHATE 4 MG/ML
INJECTION, SOLUTION INTRA-ARTICULAR; INTRALESIONAL; INTRAMUSCULAR; INTRAVENOUS; SOFT TISSUE PRN
Status: DISCONTINUED | OUTPATIENT
Start: 2021-02-10 | End: 2021-02-10 | Stop reason: SDUPTHER

## 2021-02-10 RX ORDER — ONDANSETRON 2 MG/ML
INJECTION INTRAMUSCULAR; INTRAVENOUS PRN
Status: DISCONTINUED | OUTPATIENT
Start: 2021-02-10 | End: 2021-02-10 | Stop reason: SDUPTHER

## 2021-02-10 RX ORDER — ROCURONIUM BROMIDE 10 MG/ML
INJECTION, SOLUTION INTRAVENOUS PRN
Status: DISCONTINUED | OUTPATIENT
Start: 2021-02-10 | End: 2021-02-10 | Stop reason: SDUPTHER

## 2021-02-10 RX ORDER — DIPHENHYDRAMINE HYDROCHLORIDE 50 MG/ML
INJECTION INTRAMUSCULAR; INTRAVENOUS PRN
Status: DISCONTINUED | OUTPATIENT
Start: 2021-02-10 | End: 2021-02-10 | Stop reason: SDUPTHER

## 2021-02-10 RX ORDER — LIDOCAINE HYDROCHLORIDE 20 MG/ML
INJECTION, SOLUTION EPIDURAL; INFILTRATION; INTRACAUDAL; PERINEURAL PRN
Status: DISCONTINUED | OUTPATIENT
Start: 2021-02-10 | End: 2021-02-10 | Stop reason: SDUPTHER

## 2021-02-10 RX ORDER — MAGNESIUM HYDROXIDE 1200 MG/15ML
LIQUID ORAL CONTINUOUS PRN
Status: COMPLETED | OUTPATIENT
Start: 2021-02-10 | End: 2021-02-10

## 2021-02-10 RX ORDER — MORPHINE SULFATE 2 MG/ML
2 INJECTION, SOLUTION INTRAMUSCULAR; INTRAVENOUS EVERY 5 MIN PRN
Status: DISCONTINUED | OUTPATIENT
Start: 2021-02-10 | End: 2021-02-10 | Stop reason: HOSPADM

## 2021-02-10 RX ORDER — OXYCODONE HYDROCHLORIDE AND ACETAMINOPHEN 5; 325 MG/1; MG/1
1-2 TABLET ORAL EVERY 4 HOURS PRN
Qty: 30 TABLET | Refills: 0 | Status: SHIPPED | OUTPATIENT
Start: 2021-02-10 | End: 2021-02-17

## 2021-02-10 RX ORDER — SODIUM CHLORIDE 0.9 % (FLUSH) 0.9 %
10 SYRINGE (ML) INJECTION PRN
Status: DISCONTINUED | OUTPATIENT
Start: 2021-02-10 | End: 2021-02-10 | Stop reason: HOSPADM

## 2021-02-10 RX ORDER — FENTANYL CITRATE 50 UG/ML
25 INJECTION, SOLUTION INTRAMUSCULAR; INTRAVENOUS EVERY 5 MIN PRN
Status: DISCONTINUED | OUTPATIENT
Start: 2021-02-10 | End: 2021-02-10 | Stop reason: HOSPADM

## 2021-02-10 RX ADMIN — METRONIDAZOLE 500 MG: 500 INJECTION, SOLUTION INTRAVENOUS at 10:18

## 2021-02-10 RX ADMIN — FENTANYL CITRATE 100 MCG: 50 INJECTION INTRAMUSCULAR; INTRAVENOUS at 11:39

## 2021-02-10 RX ADMIN — DEXAMETHASONE SODIUM PHOSPHATE 8 MG: 4 INJECTION, SOLUTION INTRAMUSCULAR; INTRAVENOUS at 11:50

## 2021-02-10 RX ADMIN — FENTANYL CITRATE 50 MCG: 50 INJECTION, SOLUTION INTRAMUSCULAR; INTRAVENOUS at 13:01

## 2021-02-10 RX ADMIN — DIPHENHYDRAMINE HYDROCHLORIDE 6.25 MG: 50 INJECTION, SOLUTION INTRAMUSCULAR; INTRAVENOUS at 11:59

## 2021-02-10 RX ADMIN — LIDOCAINE HYDROCHLORIDE 100 MG: 20 INJECTION, SOLUTION EPIDURAL; INFILTRATION; INTRACAUDAL; PERINEURAL at 11:41

## 2021-02-10 RX ADMIN — CEFAZOLIN SODIUM 2 G: 10 INJECTION, POWDER, FOR SOLUTION INTRAVENOUS at 11:43

## 2021-02-10 RX ADMIN — ONDANSETRON 4 MG: 2 INJECTION INTRAMUSCULAR; INTRAVENOUS at 11:32

## 2021-02-10 RX ADMIN — ROCURONIUM BROMIDE 35 MG: 10 INJECTION INTRAVENOUS at 11:41

## 2021-02-10 RX ADMIN — FENTANYL CITRATE 50 MCG: 50 INJECTION, SOLUTION INTRAMUSCULAR; INTRAVENOUS at 13:20

## 2021-02-10 RX ADMIN — SUGAMMADEX 200 MG: 100 INJECTION, SOLUTION INTRAVENOUS at 12:36

## 2021-02-10 RX ADMIN — PROPOFOL 200 MG: 10 INJECTION, EMULSION INTRAVENOUS at 11:41

## 2021-02-10 RX ADMIN — MIDAZOLAM 2 MG: 1 INJECTION INTRAMUSCULAR; INTRAVENOUS at 11:37

## 2021-02-10 RX ADMIN — OXYCODONE HYDROCHLORIDE 10 MG: 10 TABLET ORAL at 14:13

## 2021-02-10 RX ADMIN — SODIUM CHLORIDE: 9 INJECTION, SOLUTION INTRAVENOUS at 11:33

## 2021-02-10 RX ADMIN — FENTANYL CITRATE 50 MCG: 50 INJECTION, SOLUTION INTRAMUSCULAR; INTRAVENOUS at 13:32

## 2021-02-10 RX ADMIN — FAMOTIDINE 20 MG: 10 INJECTION, SOLUTION INTRAVENOUS at 11:32

## 2021-02-10 RX ADMIN — FENTANYL CITRATE 50 MCG: 50 INJECTION, SOLUTION INTRAMUSCULAR; INTRAVENOUS at 13:10

## 2021-02-10 ASSESSMENT — PAIN DESCRIPTION - LOCATION
LOCATION: ABDOMEN

## 2021-02-10 ASSESSMENT — PAIN DESCRIPTION - FREQUENCY
FREQUENCY: CONTINUOUS

## 2021-02-10 ASSESSMENT — PAIN DESCRIPTION - PROGRESSION
CLINICAL_PROGRESSION: GRADUALLY IMPROVING
CLINICAL_PROGRESSION: NOT CHANGED
CLINICAL_PROGRESSION: GRADUALLY IMPROVING
CLINICAL_PROGRESSION: NOT CHANGED
CLINICAL_PROGRESSION: NOT CHANGED

## 2021-02-10 ASSESSMENT — PULMONARY FUNCTION TESTS
PIF_VALUE: 11
PIF_VALUE: 18
PIF_VALUE: 18
PIF_VALUE: 12
PIF_VALUE: 18
PIF_VALUE: 12
PIF_VALUE: 18
PIF_VALUE: 1
PIF_VALUE: 12
PIF_VALUE: 18
PIF_VALUE: 11
PIF_VALUE: 13
PIF_VALUE: 12
PIF_VALUE: 17
PIF_VALUE: 13
PIF_VALUE: 15
PIF_VALUE: 18
PIF_VALUE: 14
PIF_VALUE: 1
PIF_VALUE: 12
PIF_VALUE: 3
PIF_VALUE: 12
PIF_VALUE: 12
PIF_VALUE: 18
PIF_VALUE: 13
PIF_VALUE: 12
PIF_VALUE: 18
PIF_VALUE: 16
PIF_VALUE: 12
PIF_VALUE: 11
PIF_VALUE: 21
PIF_VALUE: 12
PIF_VALUE: 18
PIF_VALUE: 2
PIF_VALUE: 12
PIF_VALUE: 12
PIF_VALUE: 18
PIF_VALUE: 18

## 2021-02-10 ASSESSMENT — PAIN DESCRIPTION - PAIN TYPE
TYPE: SURGICAL PAIN

## 2021-02-10 ASSESSMENT — PAIN - FUNCTIONAL ASSESSMENT
PAIN_FUNCTIONAL_ASSESSMENT: PREVENTS OR INTERFERES SOME ACTIVE ACTIVITIES AND ADLS
PAIN_FUNCTIONAL_ASSESSMENT: ACTIVITIES ARE NOT PREVENTED
PAIN_FUNCTIONAL_ASSESSMENT: PREVENTS OR INTERFERES SOME ACTIVE ACTIVITIES AND ADLS
PAIN_FUNCTIONAL_ASSESSMENT: ACTIVITIES ARE NOT PREVENTED
PAIN_FUNCTIONAL_ASSESSMENT: ACTIVITIES ARE NOT PREVENTED

## 2021-02-10 ASSESSMENT — PAIN DESCRIPTION - ORIENTATION
ORIENTATION: MID

## 2021-02-10 ASSESSMENT — PAIN DESCRIPTION - ONSET
ONSET: ON-GOING

## 2021-02-10 ASSESSMENT — PAIN SCALES - GENERAL
PAINLEVEL_OUTOF10: 7
PAINLEVEL_OUTOF10: 8
PAINLEVEL_OUTOF10: 6
PAINLEVEL_OUTOF10: 5
PAINLEVEL_OUTOF10: 4
PAINLEVEL_OUTOF10: 7
PAINLEVEL_OUTOF10: 5

## 2021-02-10 ASSESSMENT — PAIN DESCRIPTION - DESCRIPTORS
DESCRIPTORS: ACHING
DESCRIPTORS: SHARP
DESCRIPTORS: DISCOMFORT
DESCRIPTORS: ACHING

## 2021-02-10 ASSESSMENT — LIFESTYLE VARIABLES: SMOKING_STATUS: 0

## 2021-02-10 ASSESSMENT — ENCOUNTER SYMPTOMS: SHORTNESS OF BREATH: 0

## 2021-02-10 NOTE — PROGRESS NOTES
Pt alert and oriented, vitals stable on room air. Pt's pain in abdomen 5/10, tolerable. Phase 2 pills available next door, pt educated.

## 2021-02-10 NOTE — H&P
Patient is a 35year old F with pelvic pain, dysmenorrhea. Patient for diagnostic laparoscopy. No change in hx and PE from 1/14/21.

## 2021-02-10 NOTE — PROGRESS NOTES
1254 pt arrived from OR, vitals stable on 2L NC. Pt arouses to voice. Abdomen soft, non-distended. 3 small incisions- gauze/tegaderm- clean, dry, and intact. Pt having pain in abdomen, treated with PRNs as ordered.

## 2021-02-10 NOTE — PROGRESS NOTES
Vaginal Sweep Documentation         Vaginal sweep performed by DR Vianney Caruso at [phone]. No foreign objects or vaginal tears noted.

## 2021-02-10 NOTE — PROGRESS NOTES
Pt to Ph 2 from PACU. VSS. Rakel Pleva. Dressings have small amount of serosanguinous drainage noted. Pt states pain is tolerable.  Continue to monitor,

## 2021-02-10 NOTE — ANESTHESIA POSTPROCEDURE EVALUATION
Department of Anesthesiology  Postprocedure Note    Patient: Kanika Wasserman  MRN: [id number]  YOB: 1990  Date of evaluation: 2/10/2021  Time:  3:30 PM     Procedure Summary     Date: 02/10/21 Room / Location:  [facility] [address] [place] [address]    Anesthesia Start: 1137 Anesthesia Stop: 1257    Procedure: DIAGNOSTIC LAPAROSCOPY, left ovarian cystectomy (N/A ) Diagnosis:       Dysmenorrhea      (PELVIC PAIN, DYSMENORRHEA)    Surgeons: Ayan Gutierrez MD Responsible Provider: Prachi Yates MD    Anesthesia Type: general ASA Status: 3          Anesthesia Type: general    Wai Phase I: Wai Score: 10    Wai Phase II: Wai Score: 10    Last vitals: Reviewed and per EMR flowsheets.        Anesthesia Post Evaluation    Patient location during evaluation: PACU  Patient participation: complete - patient participated  Level of consciousness: awake and alert  Pain score: 0  Airway patency: patent  Nausea & Vomiting: no nausea and no vomiting  Complications: no  Cardiovascular status: blood pressure returned to baseline  Respiratory status: acceptable  Hydration status: euvolemic

## 2021-02-10 NOTE — ANESTHESIA PRE PROCEDURE
Department of Anesthesiology  Preprocedure Note       Name:  Brenda Jessica   Age:  35 y.o.  :  1990                                          MRN:  [id number]         Date:  2/10/2021      Surgeon: Lucho Metcalf):  Mariaelena Shin MD    Procedure: Procedure(s):  DIAGNOSTIC LAPAROSCOPY    Medications prior to admission:   Prior to Admission medications    Medication Sig Start Date End Date Taking? Authorizing Provider   cetirizine (ZYRTEC) 10 MG tablet Take 10 mg by mouth daily   Yes Historical Provider, MD   ibuprofen (ADVIL;MOTRIN) 600 MG tablet Take 1 tablet by mouth every 8 hours as needed for Pain 3/15/19  Yes Aguilar Paluino MD       Current medications:    Current Facility-Administered Medications   Medication Dose Route Frequency Provider Last Rate Last Admin    ceFAZolin (ANCEF) 2000 mg in dextrose 5 % 100 mL IVPB  2,000 mg Intravenous On Call to 140Rafia Muir MD        lactated ringers infusion   Intravenous Continuous Mariaelena Shin MD        sodium chloride flush 0.9 % injection 10 mL  10 mL Intravenous 2 times per day Mariaelena Shin MD        sodium chloride flush 0.9 % injection 10 mL  10 mL Intravenous PRN Mariaelena Shin MD        metronidazole (FLAGYL) 500 mg in NaCl 100 mL IVPB premix  500 mg Intravenous On Call to 1407 Dann Muir MD           Allergies:     Allergies   Allergen Reactions    Latex Rash    Kiwi Extract Swelling     Throat swells up    Hydrocodone Itching       Problem List:    Patient Active Problem List   Diagnosis Code    Genital herpes A60.00    Substance abuse ([facility] [address] F19.10    Smoker F17.200    Encounter for counseling Z71.9    Hypertension in pregnancy, pre-existing O10.919    Latex allergy Z91.040    Encounter for supervision of other normal pregnancy Z34.80    Acute pyelonephritis N10    Acute appendicitis K35.80    Generalized abdominal pain R10.84    Ileus (HCC) K56.7    RUQ abdominal pain R10.11    Hepatitis C B19.20    Possible exposure to STD Z20.2    Transaminitis R74.01       Past Medical History:        Diagnosis Date    Depression     Genital herpes 4/62011    ist outbreak    Hepatitis A 04/17/2019    Hepatitis C     STD (sexually transmitted disease) 2009    GC/chlamydia    Substance abuse ([address]     HX IV heroin. last use 9/2016       Past Surgical History:  History reviewed. No pertinent surgical history. Social History:    Social History     Tobacco Use    Smoking status: Current Every Day Smoker     Packs/day: 1.00     Years: 12.00     Pack years: 12.00     Types: Cigarettes    Smokeless tobacco: Never Used   Substance Use Topics    Alcohol use: No                                Ready to quit: Not Answered  Counseling given: Not Answered      Vital Signs (Current):   Vitals:    02/04/21 1555 02/10/21 0952   BP:  131/72   Pulse:  72   Resp:  17   Temp:  97.2 °F (36.2 °C)   TempSrc:  Temporal   SpO2:  99%   Weight: 142 lb (64.4 kg) 140 lb 10.5 oz (63.8 kg)   Height: 5' 4\" (1.626 m) 5' 4\" (1.626 m)                                              BP Readings from Last 3 Encounters:   02/10/21 131/72   01/14/21 116/78   01/10/21 123/72       NPO Status: Time of last liquid consumption: 2300                        Time of last solid consumption: 2300                        Date of last liquid consumption: 02/09/21                        Date of last solid food consumption: 02/09/21    BMI:   Wt Readings from Last 3 Encounters:   02/10/21 140 lb 10.5 oz (63.8 kg)   01/14/21 142 lb (64.4 kg)   01/10/21 143 lb 11.8 oz (65.2 kg)     Body mass index is 24.14 kg/m².     CBC:   Lab Results   Component Value Date    WBC 3.9 04/18/2019    RBC 4.48 04/18/2019    HGB 14.0 04/18/2019    HCT 42.3 04/18/2019    MCV 94.3 04/18/2019    RDW 14.5 04/18/2019     04/18/2019       CMP:   Lab Results   Component Value Date     04/18/2019    K 4.1 04/18/2019     04/18/2019    CO2 24 04/18/2019    BUN 8 04/18/2019 CREATININE 0.5 04/18/2019    GFRAA >60 04/18/2019    AGRATIO 1.1 04/18/2019    LABGLOM >60 04/18/2019    GLUCOSE 91 04/18/2019    PROT 7.2 04/18/2019    CALCIUM 8.5 04/18/2019    BILITOT 0.7 04/18/2019    ALKPHOS 262 04/18/2019     04/18/2019     04/18/2019       POC Tests: No results for input(s): POCGLU, POCNA, POCK, POCCL, POCBUN, POCHEMO, POCHCT in the last 72 hours.     Coags:   Lab Results   Component Value Date    PROTIME 11.2 04/18/2019    INR 0.98 04/18/2019       HCG (If Applicable):   Lab Results   Component Value Date    PREGTESTUR Negative 01/10/2021        ABGs: No results found for: PHART, PO2ART, ODQ3JHC, URX8OUW, BEART, B3RPMRJE     Type & Screen (If Applicable):  Lab Results   Component Value Date    LABABO O 06/06/2011    [address] Positive 06/06/2011       Drug/Infectious Status (If Applicable):  No results found for: HIV, HEPCAB    COVID-19 Screening (If Applicable):   Lab Results   Component Value Date    COVID19 Not Detected 02/05/2021         Anesthesia Evaluation  Patient summary reviewed and Nursing notes reviewed no history of anesthetic complications:   Airway: Mallampati: II  TM distance: >3 FB   Neck ROM: full  Mouth opening: > = 3 FB Dental:          Pulmonary: breath sounds clear to auscultation      (-) pneumonia, COPD, asthma, shortness of breath, recent URI, sleep apnea and not a current smoker                           Cardiovascular:  Exercise tolerance: good (>4 METS),   (+) hypertension:,     (-) pacemaker, valvular problems/murmurs, past MI, CAD, CABG/stent, dysrhythmias,  angina,  CHF, orthopnea, PND and  MONSIVAIS      Rhythm: regular                      Neuro/Psych:   (+) psychiatric history:   (-) seizures, neuromuscular disease, TIA, CVA, headaches and depression/anxiety            GI/Hepatic/Renal:   (+) hepatitis: C, liver disease:,      (-) hiatal hernia, GERD, PUD, no renal disease, bowel prep and no morbid obesity       Endo/Other:    (+) no

## 2021-02-11 ENCOUNTER — TELEPHONE (OUTPATIENT)
Dept: GYNECOLOGY | Age: 31
End: 2021-02-11

## 2021-02-11 NOTE — TELEPHONE ENCOUNTER
Patient had surgery yesterday and has a few post surgery questions.  Please contact patient at [phone]

## 2021-02-15 NOTE — OP NOTE
[address] [place] [address] [facility]                                OPERATIVE REPORT    PATIENT NAME: Nuria Butler               :        1990  MED REC NO:   [id number]                          ROOM:  ACCOUNT NO:   [de-identified]                           ADMIT DATE: 02/10/2021  PROVIDER:     Eliezer Scott MD      DATE OF PROCEDURE:  02/10/2021    PREOPERATIVE DIAGNOSES:  Pelvic pain and dysmenorrhea. POSTOPERATIVE DIAGNOSES:  Pelvic pain and dysmenorrhea with left ovarian  cyst.    OPERATION PERFORMED:  Diagnostic laparoscopy, left ovarian cystectomy. SURGEON:  Eliezer Scott MD    ANESTHESIA:  General.    FINDINGS:  Left hemorrhagic corpus luteum cyst, this was removed and  sent to Pathology. Right ovary and tube appeared normal.  Left tube  appeared normal.  Uterus appeared normal.    EBL:  100 mL. IV FLUID:  1000 mL. COMPLICATIONS:  None. CONDITION:  The patient was taken to the recovery room in stable  condition. INDICATIONS:  The patient is a 32-year-old female who presents with  pelvic pain and dysmenorrhea. Decision was made to proceed with  diagnostic laparoscopy. All the risks, benefits, and alternatives were  discussed with the patient including risk of bleeding; blood  transfusion; infection; damage to the bowel, bladder or other local  organs; need for secondary surgery; blood clots to the lungs, legs, and  pelvis after surgery; anesthesia risk. The patient understands these  risks and agrees to the procedure. OPERATIVE PROCEDURE:  The patient was taken to the operating room where  her general anesthesia was found to be adequate. She was prepped and  draped in the normal sterile fashion in the dorsal lithotomy position. Bladder was emptied prior to the procedure. There was no Rojas placed,  just a red rubber catheter was used to drain the bladder.   Cervix was grasped with a single-tooth tenaculum. The Hulka dilator was placed in  the uterus for uterine manipulation. A 5-mm infraumbilical incision was made with a scalpel after injecting  0.25% Marcaine. A 5-mm Optiview trocar was placed. The patient was  placed in Trendelenburg. A 5-mm suprapubic incision was made with a  scalpel after injecting with 0.25% Marcaine. A 5-mm trocar was placed. A left lower quadrant incision was made with a scalpel after injecting  with 0.25% Marcaine, a 5-mm trocar was placed. Care was made to avoid  the epigastric vessels. The right ovary and tube appeared normal.   Uterus appeared normal.  Left ovary had a hemorrhagic corpus luteum  cyst.  Left tube appeared normal.  Decision was made to remove the cyst.    Using the LigaSure blunt tip, the paddle of this was removed carefully  from the ovarian tissues. Ovarian cyst was removed. Base of the ovary  was cauterized with the LigaSure blunt tip and the monopolar paddle. Irrigation was performed. There were no other abnormalities seen in the  pelvis. The decision was made to end the procedure at this point. Hemostasis had been achieved. Decision was made to remove all the  trocars. Trocars were removed all under direct visualization. Pneumoperitoneum was released. The incisions were all closed with 4-0  Monocryl in subcuticular fashion. Steri-Strips and Mastisol were  placed. Hulka dilator was removed. Decision was made to end the  procedure. The patient was taken to the recovery room in stable  condition.         Michela Major MD    D: 02/14/2021 22:08:59       T: 02/15/2021 0:56:24     [email or web address]  Job#: [id number]     Doc#: [id number]    CC:

## 2021-02-25 ENCOUNTER — OFFICE VISIT (OUTPATIENT)
Dept: GYNECOLOGY | Age: 31
End: 2021-02-25

## 2021-02-25 VITALS
RESPIRATION RATE: 17 BRPM | BODY MASS INDEX: 24.55 KG/M2 | HEIGHT: 64 IN | SYSTOLIC BLOOD PRESSURE: 116 MMHG | DIASTOLIC BLOOD PRESSURE: 76 MMHG | HEART RATE: 79 BPM | TEMPERATURE: 97.2 F | WEIGHT: 143.8 LBS

## 2021-02-25 DIAGNOSIS — Z98.890 POST-OPERATIVE STATE: Primary | ICD-10-CM

## 2021-02-25 PROCEDURE — 99024 POSTOP FOLLOW-UP VISIT: CPT | Performed by: OBSTETRICS & GYNECOLOGY

## 2021-02-25 RX ORDER — DROSPIRENONE 4 MG/1
1 TABLET, FILM COATED ORAL DAILY
Qty: 84 TABLET | Refills: 3 | Status: SHIPPED | OUTPATIENT
Start: 2021-02-25 | End: 2022-01-14

## 2021-02-25 NOTE — LETTER
[facility] Gynecology  [address] [address]  Phone: [phone]  Fax: [phone]    Franchesca Jimenez MD February 25, 2021                      Patient: Sohan Alcocer   YOB: 1990   Date of Visit: 2/25/2021       To Whom It May Concern:    Natalie Ayala can return to work with no restrictions on 3/8/2021. Please excuse her until then due to surgery.    Sincerely,        Franchesca Jimenez MD

## 2021-02-26 NOTE — PROGRESS NOTES
Patient is here for post op visit. Patient healing well. Patient with questions about future pain. /76 (Site: Right Upper Arm, Position: Sitting, Cuff Size: Large Adult)   Pulse 79   Temp 97.2 °F (36.2 °C)   Resp 17   Ht 5' 4\" (1.626 m)   Wt 143 lb 12.8 oz (65.2 kg)   BMI 24.68 kg/m²     WDWN in NAD  A and O x 3  ABD-soft, NT, ND, incision cdi  Ext-no c/c/e, no cords, NT    Plan-s/p l/s left ovarian cystectomy  -healing well  -discussed future ovulatory cysts-try Slynd and see if helps symptoms.

## 2021-03-03 ENCOUNTER — TELEPHONE (OUTPATIENT)
Dept: GYNECOLOGY | Age: 31
End: 2021-03-03

## 2021-03-03 NOTE — TELEPHONE ENCOUNTER
Ask patient if she tried to use the coupon for [facility]. If she did not, we should have some or she can get it online.

## 2021-03-03 NOTE — TELEPHONE ENCOUNTER
Patient called into office stating that her birth control Cleopatra Lynn is not covered by her insurance. Patient says out of pocket cost is 6 hundred  and something dollars and she cant afford this.      Patient can be contacted at [phone]

## 2021-03-05 NOTE — TELEPHONE ENCOUNTER
I need Post Op notes from patients visit on 2/25/2021  To send along with paper work for her disability.

## 2021-03-05 NOTE — TELEPHONE ENCOUNTER
Filled out paper work and faxed all documents needed. Fax to [phone] scanning document into patients chart.

## 2021-03-05 NOTE — TELEPHONE ENCOUNTER
Called and spoke to patient patient stated she got her Slynd with the coupon Dr Yi Richter gave her. Patient paid $80.00 for 3 months of birth control. Patient also said she wants to go back to work on Monday March 8th. Is that okay date for patient to return to work.

## 2021-05-05 ENCOUNTER — APPOINTMENT (OUTPATIENT)
Dept: GENERAL RADIOLOGY | Age: 31
End: 2021-05-05
Payer: COMMERCIAL

## 2021-05-05 ENCOUNTER — HOSPITAL ENCOUNTER (EMERGENCY)
Age: 31
Discharge: HOME | End: 2021-05-05
Attending: EMERGENCY MEDICINE
Payer: COMMERCIAL

## 2021-05-05 VITALS
SYSTOLIC BLOOD PRESSURE: 135 MMHG | BODY MASS INDEX: 24.79 KG/M2 | DIASTOLIC BLOOD PRESSURE: 92 MMHG | HEART RATE: 72 BPM | OXYGEN SATURATION: 100 % | RESPIRATION RATE: 14 BRPM | TEMPERATURE: 98.1 F | WEIGHT: 145.19 LBS | HEIGHT: 64 IN

## 2021-05-05 DIAGNOSIS — S46.812A TRAPEZIUS STRAIN, LEFT, INITIAL ENCOUNTER: ICD-10-CM
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
## 2021-05-05 DIAGNOSIS — S16.1XXA STRAIN OF NECK MUSCLE, INITIAL ENCOUNTER: Primary | ICD-10-CM

## 2021-05-05 PROCEDURE — 72040 X-RAY EXAM NECK SPINE 2-3 VW: CPT

## 2021-05-05 PROCEDURE — 99282 EMERGENCY DEPT VISIT SF MDM: CPT

## 2021-05-05 RX ORDER — IBUPROFEN 600 MG/1
600 TABLET ORAL EVERY 8 HOURS PRN
Qty: 20 TABLET | Refills: 0 | Status: SHIPPED | OUTPATIENT
Start: 2021-05-05 | End: 2022-01-14

## 2021-05-05 RX ORDER — METHOCARBAMOL 750 MG/1
750 TABLET, FILM COATED ORAL 3 TIMES DAILY PRN
Qty: 30 TABLET | Refills: 0 | Status: SHIPPED | OUTPATIENT
Start: 2021-05-05 | End: 2021-05-15

## 2021-05-05 ASSESSMENT — PAIN SCALES - GENERAL: PAINLEVEL_OUTOF10: 6

## 2021-05-05 ASSESSMENT — PAIN DESCRIPTION - LOCATION: LOCATION: NECK
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
## 2021-05-07 ENCOUNTER — TELEPHONE (OUTPATIENT)
Dept: GYNECOLOGY | Age: 31
End: 2021-05-07

## 2021-05-07 NOTE — TELEPHONE ENCOUNTER
The surgery was done to diagnose pain. There is no surgery to regulate cycles. The only way to regulate cycles is through birth control or hormones. Is patient interested in this?

## 2021-05-26 ENCOUNTER — TELEPHONE (OUTPATIENT)
Dept: GYNECOLOGY | Age: 31
End: 2021-05-26

## 2021-05-27 RX ORDER — NORETHINDRONE ACETATE AND ETHINYL ESTRADIOL AND FERROUS FUMARATE 1MG-20(24)
1 KIT ORAL DAILY
Qty: 84 TABLET | Refills: 3 | Status: SHIPPED | OUTPATIENT
Start: 2021-05-27 | End: 2022-01-14

## 2021-05-27 NOTE — TELEPHONE ENCOUNTER
Called and spoke with patient. Has been spotting or bleeding every day for 90 days. Change to [address]. Call if problems even after one month.

## 2021-12-08 PROBLEM — R10.84 GENERALIZED ABDOMINAL PAIN: Status: RESOLVED | Noted: 2017-05-04 | Resolved: 2021-12-08

## 2021-12-08 PROBLEM — R10.11 RUQ ABDOMINAL PAIN: Status: RESOLVED | Noted: 2019-04-17 | Resolved: 2021-12-08

## 2021-12-08 PROBLEM — N10 ACUTE PYELONEPHRITIS: Status: RESOLVED | Noted: 2017-05-03 | Resolved: 2021-12-08

## 2021-12-08 PROBLEM — Z20.2 POSSIBLE EXPOSURE TO STD: Status: RESOLVED | Noted: 2019-04-17 | Resolved: 2021-12-08

## 2021-12-08 PROBLEM — R74.01 TRANSAMINITIS: Status: RESOLVED | Noted: 2019-04-17 | Resolved: 2021-12-08

## 2021-12-08 PROBLEM — F17.210 CIGARETTE NICOTINE DEPENDENCE WITHOUT COMPLICATION: Status: ACTIVE | Noted: 2021-12-08

## 2021-12-20 PROBLEM — K35.80 ACUTE APPENDICITIS: Status: RESOLVED | Noted: 2017-05-03 | Resolved: 2021-12-20

## 2021-12-20 PROBLEM — K56.7 ILEUS (HCC): Status: RESOLVED | Noted: 2017-05-04 | Resolved: 2021-12-20

## 2021-12-20 NOTE — PATIENT INSTRUCTIONS
Patient Education        Well Visit, Ages 25 to 48: Care Instructions  Overview     Well visits can help you stay healthy. Your doctor has checked your overall health and may have suggested ways to take good care of yourself. Your doctor also may have recommended tests. At home, you can help prevent illness with healthy eating, regular exercise, and other steps. Follow-up care is a key part of your treatment and safety. Be sure to make and go to all appointments, and call your doctor if you are having problems. It's also a good idea to know your test results and keep a list of the medicines you take. How can you care for yourself at home? · Get screening tests that you and your doctor decide on. Screening helps find diseases before any symptoms appear. · Eat healthy foods. Choose fruits, vegetables, whole grains, protein, and low-fat dairy foods. Limit fat, especially saturated fat. Reduce salt in your diet. · Limit alcohol. If you are a man, have no more than 2 drinks a day or 14 drinks a week. If you are a woman, have no more than 1 drink a day or 7 drinks a week. · Get at least 30 minutes of physical activity on most days of the week. Walking is a good choice. You also may want to do other activities, such as running, swimming, cycling, or playing tennis or team sports. Discuss any changes in your exercise program with your doctor. · Reach and stay at a healthy weight. This will lower your risk for many problems, such as obesity, diabetes, heart disease, and high blood pressure. · Do not smoke or allow others to smoke around you. If you need help quitting, talk to your doctor about stop-smoking programs and medicines. These can increase your chances of quitting for good. · Care for your mental health. It is easy to get weighed down by worry and stress. Learn strategies to manage stress, like deep breathing and mindfulness, and stay connected with your family and community.  If you find you often feel sad or hopeless, talk with your doctor. Treatment can help. · Talk to your doctor about whether you have any risk factors for sexually transmitted infections (STIs). You can help prevent STIs if you wait to have sex with a new partner (or partners) until you've each been tested for STIs. It also helps if you use condoms (male or female condoms) and if you limit your sex partners to one person who only has sex with you. Vaccines are available for some STIs, such as HPV. · Use birth control if it's important to you to prevent pregnancy. Talk with your doctor about the choices available and what might be best for you. · If you think you may have a problem with alcohol or drug use, talk to your doctor. This includes prescription medicines (such as amphetamines and opioids) and illegal drugs (such as cocaine and methamphetamine). Your doctor can help you figure out what type of treatment is best for you. · Protect your skin from too much sun. When you're outdoors from 10 a.m. to 4 p.m., stay in the shade or cover up with clothing and a hat with a wide brim. Wear sunglasses that block UV rays. Even when it's cloudy, put broad-spectrum sunscreen (SPF 30 or higher) on any exposed skin. · See a dentist one or two times a year for checkups and to have your teeth cleaned. · Wear a seat belt in the car. When should you call for help? Watch closely for changes in your health, and be sure to contact your doctor if you have any problems or symptoms that concern you. Where can you learn more? Go to [email or web address]://[email or web address].[email or web address]. org and sign in to your [employer] account. Enter P072 in the [employer] box to learn more about \"Well Visit, Ages 25 to 48: Care Instructions. \"     If you do not have an account, please click on the \"Sign Up Now\" link. Current as of: February 11, 2021               Content Version: 13.0  © 7606-7203 Healthwise, Incorporated. Care instructions adapted under license by [facility] ([facility]). If you have questions about a medical condition or this instruction, always ask your healthcare professional. [place] [address] any warranty or liability for your use of this information.
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
## 2021-12-21 ENCOUNTER — OFFICE VISIT (OUTPATIENT)
Dept: PRIMARY CARE CLINIC | Age: 31
End: 2021-12-21
Payer: COMMERCIAL

## 2021-12-21 VITALS
HEART RATE: 82 BPM | DIASTOLIC BLOOD PRESSURE: 80 MMHG | HEIGHT: 64 IN | BODY MASS INDEX: 27.72 KG/M2 | WEIGHT: 162.4 LBS | SYSTOLIC BLOOD PRESSURE: 124 MMHG

## 2021-12-21 DIAGNOSIS — F19.11 SUBSTANCE ABUSE IN REMISSION (HCC): ICD-10-CM

## 2021-12-21 DIAGNOSIS — Z76.89 ENCOUNTER TO ESTABLISH CARE WITH NEW DOCTOR: Primary | ICD-10-CM

## 2021-12-21 DIAGNOSIS — F17.210 CIGARETTE NICOTINE DEPENDENCE WITHOUT COMPLICATION: ICD-10-CM

## 2021-12-21 DIAGNOSIS — F33.2 SEVERE EPISODE OF RECURRENT MAJOR DEPRESSIVE DISORDER, WITHOUT PSYCHOTIC FEATURES (HCC): ICD-10-CM

## 2021-12-21 DIAGNOSIS — B18.2 CHRONIC HEPATITIS C WITHOUT HEPATIC COMA (HCC): ICD-10-CM

## 2021-12-21 DIAGNOSIS — N94.6 DYSMENORRHEA: ICD-10-CM

## 2021-12-21 PROBLEM — B19.20 VIRAL HEPATITIS C: Status: ACTIVE | Noted: 2018-05-10

## 2021-12-21 PROCEDURE — 99385 PREV VISIT NEW AGE 18-39: CPT | Performed by: STUDENT IN AN ORGANIZED HEALTH CARE EDUCATION/TRAINING PROGRAM

## 2021-12-21 PROCEDURE — 99204 OFFICE O/P NEW MOD 45 MIN: CPT | Performed by: STUDENT IN AN ORGANIZED HEALTH CARE EDUCATION/TRAINING PROGRAM

## 2021-12-21 RX ORDER — DULOXETIN HYDROCHLORIDE 20 MG/1
20 CAPSULE, DELAYED RELEASE ORAL DAILY
Qty: 30 CAPSULE | Refills: 3 | Status: SHIPPED | OUTPATIENT
Start: 2021-12-21 | End: 2022-02-11 | Stop reason: SDUPTHER

## 2021-12-21 SDOH — ECONOMIC STABILITY: FOOD INSECURITY: WITHIN THE PAST 12 MONTHS, THE FOOD YOU BOUGHT JUST DIDN'T LAST AND YOU DIDN'T HAVE MONEY TO GET MORE.: NEVER TRUE

## 2021-12-21 SDOH — ECONOMIC STABILITY: FOOD INSECURITY: WITHIN THE PAST 12 MONTHS, YOU WORRIED THAT YOUR FOOD WOULD RUN OUT BEFORE YOU GOT MONEY TO BUY MORE.: NEVER TRUE

## 2021-12-21 ASSESSMENT — ENCOUNTER SYMPTOMS
NAUSEA: 0
DIARRHEA: 0
CONSTIPATION: 0
CHEST TIGHTNESS: 0
SHORTNESS OF BREATH: 0
BLOOD IN STOOL: 0
STRIDOR: 0
ABDOMINAL PAIN: 0
VOMITING: 0
BACK PAIN: 0

## 2021-12-21 ASSESSMENT — PATIENT HEALTH QUESTIONNAIRE - PHQ9
SUM OF ALL RESPONSES TO PHQ QUESTIONS 1-9: 26
3. TROUBLE FALLING OR STAYING ASLEEP: 3
7. TROUBLE CONCENTRATING ON THINGS, SUCH AS READING THE NEWSPAPER OR WATCHING TELEVISION: 3
1. LITTLE INTEREST OR PLEASURE IN DOING THINGS: 3
SUM OF ALL RESPONSES TO PHQ QUESTIONS 1-9: 24
SUM OF ALL RESPONSES TO PHQ9 QUESTIONS 1 & 2: 6
8. MOVING OR SPEAKING SO SLOWLY THAT OTHER PEOPLE COULD HAVE NOTICED. OR THE OPPOSITE, BEING SO FIGETY OR RESTLESS THAT YOU HAVE BEEN MOVING AROUND A LOT MORE THAN USUAL: 3
6. FEELING BAD ABOUT YOURSELF - OR THAT YOU ARE A FAILURE OR HAVE LET YOURSELF OR YOUR FAMILY DOWN: 3
4. FEELING TIRED OR HAVING LITTLE ENERGY: 3
5. POOR APPETITE OR OVEREATING: 3
9. THOUGHTS THAT YOU WOULD BE BETTER OFF DEAD, OR OF HURTING YOURSELF: 2
10. IF YOU CHECKED OFF ANY PROBLEMS, HOW DIFFICULT HAVE THESE PROBLEMS MADE IT FOR YOU TO DO YOUR WORK, TAKE CARE OF THINGS AT HOME, OR GET ALONG WITH OTHER PEOPLE: 2
SUM OF ALL RESPONSES TO PHQ QUESTIONS 1-9: 26
2. FEELING DOWN, DEPRESSED OR HOPELESS: 3

## 2021-12-21 ASSESSMENT — COLUMBIA-SUICIDE SEVERITY RATING SCALE - C-SSRS
2. HAVE YOU ACTUALLY HAD ANY THOUGHTS OF KILLING YOURSELF?: NO
6. HAVE YOU EVER DONE ANYTHING, STARTED TO DO ANYTHING, OR PREPARED TO DO ANYTHING TO END YOUR LIFE?: NO
1. WITHIN THE PAST MONTH, HAVE YOU WISHED YOU WERE DEAD OR WISHED YOU COULD GO TO SLEEP AND NOT WAKE UP?: YES

## 2021-12-21 ASSESSMENT — SOCIAL DETERMINANTS OF HEALTH (SDOH): HOW HARD IS IT FOR YOU TO PAY FOR THE VERY BASICS LIKE FOOD, HOUSING, MEDICAL CARE, AND HEATING?: NOT HARD AT ALL

## 2022-01-14 ENCOUNTER — OFFICE VISIT (OUTPATIENT)
Dept: PSYCHIATRY | Age: 32
End: 2022-01-14
Payer: COMMERCIAL

## 2022-01-14 VITALS
RESPIRATION RATE: 14 BRPM | DIASTOLIC BLOOD PRESSURE: 93 MMHG | WEIGHT: 160 LBS | OXYGEN SATURATION: 100 % | SYSTOLIC BLOOD PRESSURE: 146 MMHG | BODY MASS INDEX: 27.46 KG/M2 | HEART RATE: 93 BPM

## 2022-01-14 DIAGNOSIS — F43.10 PTSD (POST-TRAUMATIC STRESS DISORDER): Primary | ICD-10-CM

## 2022-01-14 DIAGNOSIS — F41.1 GENERALIZED ANXIETY DISORDER: ICD-10-CM

## 2022-01-14 DIAGNOSIS — F11.21 OPIOID USE DISORDER, SEVERE, IN SUSTAINED REMISSION (HCC): ICD-10-CM

## 2022-01-14 DIAGNOSIS — F33.2 SEVERE EPISODE OF RECURRENT MAJOR DEPRESSIVE DISORDER, WITHOUT PSYCHOTIC FEATURES (HCC): ICD-10-CM

## 2022-01-14 DIAGNOSIS — F60.3 BORDERLINE PERSONALITY DISORDER (HCC): ICD-10-CM

## 2022-01-14 DIAGNOSIS — F40.10 SOCIAL ANXIETY DISORDER: ICD-10-CM

## 2022-01-14 PROBLEM — F19.10 POLYSUBSTANCE ABUSE (HCC): Status: ACTIVE | Noted: 2022-01-14

## 2022-01-14 PROCEDURE — 99204 OFFICE O/P NEW MOD 45 MIN: CPT | Performed by: STUDENT IN AN ORGANIZED HEALTH CARE EDUCATION/TRAINING PROGRAM

## 2022-01-14 PROCEDURE — 90833 PSYTX W PT W E/M 30 MIN: CPT | Performed by: STUDENT IN AN ORGANIZED HEALTH CARE EDUCATION/TRAINING PROGRAM

## 2022-01-14 RX ORDER — PRAZOSIN HYDROCHLORIDE 1 MG/1
1 CAPSULE ORAL NIGHTLY
Qty: 7 CAPSULE | Refills: 0 | Status: SHIPPED | OUTPATIENT
Start: 2022-01-14 | End: 2022-02-11 | Stop reason: SDUPTHER

## 2022-01-14 RX ORDER — PRAZOSIN HYDROCHLORIDE 2 MG/1
CAPSULE ORAL
Qty: 30 CAPSULE | Refills: 2 | Status: SHIPPED | OUTPATIENT
Start: 2022-01-21 | End: 2022-02-28

## 2022-01-14 ASSESSMENT — COLUMBIA-SUICIDE SEVERITY RATING SCALE - C-SSRS
6. HAVE YOU EVER DONE ANYTHING, STARTED TO DO ANYTHING, OR PREPARED TO DO ANYTHING TO END YOUR LIFE?: YES
2. HAVE YOU ACTUALLY HAD ANY THOUGHTS OF KILLING YOURSELF?: NO
1. WITHIN THE PAST MONTH, HAVE YOU WISHED YOU WERE DEAD OR WISHED YOU COULD GO TO SLEEP AND NOT WAKE UP?: NO
7. DID THIS OCCUR IN THE LAST THREE MONTHS: NO

## 2022-01-14 ASSESSMENT — ENCOUNTER SYMPTOMS
EYES NEGATIVE: 1
GASTROINTESTINAL NEGATIVE: 1
RESPIRATORY NEGATIVE: 1
ALLERGIC/IMMUNOLOGIC NEGATIVE: 1

## 2022-01-14 ASSESSMENT — PATIENT HEALTH QUESTIONNAIRE - PHQ9
SUM OF ALL RESPONSES TO PHQ QUESTIONS 1-9: 26
7. TROUBLE CONCENTRATING ON THINGS, SUCH AS READING THE NEWSPAPER OR WATCHING TELEVISION: 3
5. POOR APPETITE OR OVEREATING: 3
4. FEELING TIRED OR HAVING LITTLE ENERGY: 3
SUM OF ALL RESPONSES TO PHQ QUESTIONS 1-9: 24
2. FEELING DOWN, DEPRESSED OR HOPELESS: 3
SUM OF ALL RESPONSES TO PHQ9 QUESTIONS 1 & 2: 6
9. THOUGHTS THAT YOU WOULD BE BETTER OFF DEAD, OR OF HURTING YOURSELF: 2
SUM OF ALL RESPONSES TO PHQ QUESTIONS 1-9: 26
6. FEELING BAD ABOUT YOURSELF - OR THAT YOU ARE A FAILURE OR HAVE LET YOURSELF OR YOUR FAMILY DOWN: 3
8. MOVING OR SPEAKING SO SLOWLY THAT OTHER PEOPLE COULD HAVE NOTICED. OR THE OPPOSITE, BEING SO FIGETY OR RESTLESS THAT YOU HAVE BEEN MOVING AROUND A LOT MORE THAN USUAL: 3
SUM OF ALL RESPONSES TO PHQ QUESTIONS 1-9: 26
1. LITTLE INTEREST OR PLEASURE IN DOING THINGS: 3
3. TROUBLE FALLING OR STAYING ASLEEP: 3
10. IF YOU CHECKED OFF ANY PROBLEMS, HOW DIFFICULT HAVE THESE PROBLEMS MADE IT FOR YOU TO DO YOUR WORK, TAKE CARE OF THINGS AT HOME, OR GET ALONG WITH OTHER PEOPLE: 3

## 2022-01-14 ASSESSMENT — ANXIETY QUESTIONNAIRES
6. BECOMING EASILY ANNOYED OR IRRITABLE: 3
3. WORRYING TOO MUCH ABOUT DIFFERENT THINGS: 3
1. FEELING NERVOUS, ANXIOUS, OR ON EDGE: 3
5. BEING SO RESTLESS THAT IT IS HARD TO SIT STILL: 3
2. NOT BEING ABLE TO STOP OR CONTROL WORRYING: 3
IF YOU CHECKED OFF ANY PROBLEMS ON THIS QUESTIONNAIRE, HOW DIFFICULT HAVE THESE PROBLEMS MADE IT FOR YOU TO DO YOUR WORK, TAKE CARE OF THINGS AT HOME, OR GET ALONG WITH OTHER PEOPLE: VERY DIFFICULT
GAD7 TOTAL SCORE: 21
7. FEELING AFRAID AS IF SOMETHING AWFUL MIGHT HAPPEN: 3
4. TROUBLE RELAXING: 3
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
## 2022-02-09 ENCOUNTER — OFFICE VISIT (OUTPATIENT)
Dept: PSYCHOLOGY | Age: 32
End: 2022-02-09
Payer: COMMERCIAL

## 2022-02-09 DIAGNOSIS — F33.2 SEVERE EPISODE OF RECURRENT MAJOR DEPRESSIVE DISORDER, WITHOUT PSYCHOTIC FEATURES (HCC): ICD-10-CM

## 2022-02-09 DIAGNOSIS — F43.10 PTSD (POST-TRAUMATIC STRESS DISORDER): Primary | ICD-10-CM

## 2022-02-09 PROCEDURE — 90791 PSYCH DIAGNOSTIC EVALUATION: CPT | Performed by: PSYCHOLOGIST

## 2022-02-09 ASSESSMENT — ANXIETY QUESTIONNAIRES
1. FEELING NERVOUS, ANXIOUS, OR ON EDGE: 2-OVER HALF THE DAYS
6. BECOMING EASILY ANNOYED OR IRRITABLE: 2-OVER HALF THE DAYS
4. TROUBLE RELAXING: 3-NEARLY EVERY DAY
3. WORRYING TOO MUCH ABOUT DIFFERENT THINGS: 3-NEARLY EVERY DAY
GAD7 TOTAL SCORE: 18
5. BEING SO RESTLESS THAT IT IS HARD TO SIT STILL: 3-NEARLY EVERY DAY
2. NOT BEING ABLE TO STOP OR CONTROL WORRYING: 2-OVER HALF THE DAYS
7. FEELING AFRAID AS IF SOMETHING AWFUL MIGHT HAPPEN: 3-NEARLY EVERY DAY

## 2022-02-09 ASSESSMENT — PATIENT HEALTH QUESTIONNAIRE - PHQ9
4. FEELING TIRED OR HAVING LITTLE ENERGY: 3
3. TROUBLE FALLING OR STAYING ASLEEP: 3
2. FEELING DOWN, DEPRESSED OR HOPELESS: 2
9. THOUGHTS THAT YOU WOULD BE BETTER OFF DEAD, OR OF HURTING YOURSELF: 0
1. LITTLE INTEREST OR PLEASURE IN DOING THINGS: 2
SUM OF ALL RESPONSES TO PHQ QUESTIONS 1-9: 19
SUM OF ALL RESPONSES TO PHQ QUESTIONS 1-9: 19
6. FEELING BAD ABOUT YOURSELF - OR THAT YOU ARE A FAILURE OR HAVE LET YOURSELF OR YOUR FAMILY DOWN: 2
SUM OF ALL RESPONSES TO PHQ QUESTIONS 1-9: 19
5. POOR APPETITE OR OVEREATING: 1
8. MOVING OR SPEAKING SO SLOWLY THAT OTHER PEOPLE COULD HAVE NOTICED. OR THE OPPOSITE, BEING SO FIGETY OR RESTLESS THAT YOU HAVE BEEN MOVING AROUND A LOT MORE THAN USUAL: 3
SUM OF ALL RESPONSES TO PHQ QUESTIONS 1-9: 19
SUM OF ALL RESPONSES TO PHQ9 QUESTIONS 1 & 2: 4
10. IF YOU CHECKED OFF ANY PROBLEMS, HOW DIFFICULT HAVE THESE PROBLEMS MADE IT FOR YOU TO DO YOUR WORK, TAKE CARE OF THINGS AT HOME, OR GET ALONG WITH OTHER PEOPLE: 2
7. TROUBLE CONCENTRATING ON THINGS, SUCH AS READING THE NEWSPAPER OR WATCHING TELEVISION: 3
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
## 2022-02-09 NOTE — PATIENT INSTRUCTIONS
1. Call insurance about co-pay expense   2. Call for PTSD: Cognitive behavior therapy/supportive treatment:    -anxiety/trauma specialist  -female therapist  -confirm insurance  -in person or on line. Can on line every now and then. [facility] and [address] [place] [address] [address]  [phone]    [facility]  [address]  [phone]    2. Continue to take psychiatric medication as prescribed, go to Dr Destiny Fontanez on 2/11  3. My chart Dr Timmy Mendez about whether or not treatment affordable   4.  No further follow up with Dr Timmy Mendez, return as needed

## 2022-02-10 NOTE — PROGRESS NOTES
PSYCHIATRY PROGRESS NOTE    Luis Cohen  1990  02/11/22  Face to Face time: 45 minutes, of which 25min were spent in supportive psychotherapy and care coordination  PCP: Dilcia Stokes DO    CC:   Chief Complaint   Patient presents with    Depression    Anxiety     Patient is a 64-year-old female without significant past medical history who presents to the outpatient psychiatry clinic today for evaluation and management of depression and anxiety.     A:  Patient's presentation today is indicative of mild improvement on the current medication dose. She still has significant difficulties with depression as well as PTSD. We will continue to modify her medication regimen to better cover these disorders. Diagnosis:  PTSD, chronic  MDD R, severe without psychotic features (versus possible dysthymic disorder)  Generalized anxiety disorder  Social anxiety disorder  Borderline personality disorder  Opioid use disorder, severe, in sustained remission  Polysubstance use, in sustained remission (cocaine use, hallucinogen use, cannabis use, amphetamine use)    P:   1. We will increase patient's duloxetine to 60 mg daily for treatment of depression, anxiety, and PTSD. 2.  Will increase patient's prazosin from 2 mg to 3 mg to better cover her nightmares/hypervigilance that she is experiencing. Medication Monitoring:    - PDMP reviewed: No recent prescriptions    Follow-up: 4 weeks    Safety: Pt was counseled on the potential for increased suicidal ideations and advised on potential options for dealing with these including hotlines, calling the office, or going to the nearest emergency room. __________________________________________________________________________    S:   Indicated that she had not felt significant changes courtesy of the medications. She denied any adverse effects, but noted that there had been only some fleeting alterations to her mood.  She identified some minor improvement in nightmares that was temporary after the starting of the medications. Has still had anxiety that has come \"out of nowhere\". This does appear to continually be related to social anxiety and involves situations such as the grocery store. Patient indicated that she does have continued stress on her including needing a new motor in her car, her mom with acute heart failure, her knees finding out that she is deaf, and difficulties with her kids and their fathers. Patient identified that because of all the stresses in her life she constantly feels \"overwhelmed\". When discussing some of the stressors the patient touched on the fact that she does have traumas and did appear to have a minor traumatic dissociation. She was also rather hypervigilant during the visit today. Patient denied any SI/HI/AVH today. ROS:  Review of Systems   Constitutional: Negative. HENT: Negative. Eyes: Negative. Respiratory: Negative. Cardiovascular: Negative. Gastrointestinal: Negative. Endocrine: Negative. Genitourinary: Negative. Musculoskeletal: Negative. Skin: Negative. Allergic/Immunologic: Negative. Neurological: Negative. Hematological: Negative. Psychiatric/Behavioral: Positive for decreased concentration, dysphoric mood and sleep disturbance. The patient is nervous/anxious.          Brief Medical Hx:   Patient Active Problem List   Diagnosis    Opioid use disorder, severe, in sustained remission ([address]    Hepatitis C    Cigarette nicotine dependence without complication    Severe episode of recurrent major depressive disorder, without psychotic features ([address]    Viral hepatitis C    PTSD (post-traumatic stress disorder)    Generalized anxiety disorder    Social anxiety disorder    Borderline personality disorder ([address]    Polysubstance abuse ([address]        Brief Psych Hx:  Hosp: 2 prior hospitalizations in her teenage years  Diagnoses: Depression  Med trials: sertraline (GI SE)  Outpt: Previously done therapy but not currently. Previously had outpatient psychiatry help but not recently  NSSI: History of cutting behaviors during her teenage years. Patient has not engaged in this in greater than 10 years  Suicide Attempts: 4-5 suicide attempts. Last attempt was at age 17. All attempts were done using overdoses of heroin    O:  Wt Readings from Last 3 Encounters:   02/11/22 163 lb 9.6 oz (74.2 kg)   01/14/22 160 lb (72.6 kg)   12/21/21 162 lb 6.4 oz (73.7 kg)       Vitals:    02/11/22 1400   BP: (!) 140/105   Pulse: 80   Resp: 12   Weight: 163 lb 9.6 oz (74.2 kg)       Mental Status Exam:   Appearance:    Appropriately dressed  Motor: Minimal movements, arms folded across her chest  Eye Contact: Fair  Speech:    Appropriate rate, rhythm  Language:   Appropriate diction  Mood/Affect:   \"No better\"/blunted  Thought Process:    Linear, logical  Thought Content:    Traumatic content as well as depressive content predominating, no SI/HI  Hallucinations:   Denied, not seem to be responding internal stimuli  Associations:   Intact generally, one episode of traumatic dissociation during the visit  Attention/Concentration:   Intact  Orientation:    Alert and oriented x4  Memory:   Intact  Fund of Knowledge:    Appropriate for age and education  Insight/Judgement:   Intact/intact      ARTEMIO-7 SCREENING 2/9/2022 1/14/2022   Feeling nervous, anxious, or on edge - Nearly every day   Not being able to stop or control worrying - Nearly every day   Worrying too much about different things - Nearly every day   Trouble relaxing - Nearly every day   Being so restless that it is hard to sit still - Nearly every day   Becoming easily annoyed or irritable - Nearly every day   Feeling afraid as if something awful might happen - Nearly every day   ARTEMIO-7 Total Score - 21   How difficult have these problems made it for you to do your work, take care of things at home, or get along with other people?  - Very difficult Feeling nervous, anxious, or on edge 2-Over half the days -   Not able to stop or control worrying 2-Over half the days -   Worrying too much about different things 3-Nearly every day -   Trouble relaxing 3-Nearly every day -   Being so restless that it's hard to sit still 3-Nearly every day -   Becoming easily annoyed or irritable 2-Over half the days -   Feeling afraid as if something awful might happen 3-Nearly every day -   ARTEMIO-7 Total Score 18 -     PHQ-9 Questionaire 2/9/2022 1/14/2022   Little interest or pleasure in doing things 2 3   Feeling down, depressed, or hopeless 2 3   Trouble falling or staying asleep, or sleeping too much 3 3   Feeling tired or having little energy 3 3   Poor appetite or overeating 1 3   Feeling bad about yourself - or that you are a failure or have let yourself or your family down 2 3   Trouble concentrating on things, such as reading the newspaper or watching television 3 3   Moving or speaking so slowly that other people could have noticed. Or the opposite - being so fidgety or restless that you have been moving around a lot more than usual 3 3   Thoughts that you would be better off dead, or of hurting yourself in some way 0 2   PHQ-9 Total Score 19 26   If you checked off any problems, how difficult have these problems made it for you to do your work, take care of things at home, or get along with other people?  2 3          Labs:     Admission on 02/10/2021, Discharged on 02/10/2021   Component Date Value Ref Range Status    WBC 02/10/2021 5.3  4.0 - 11.0 K/uL Final    RBC 02/10/2021 4.60  4.00 - 5.20 M/uL Final    Hemoglobin 02/10/2021 14.4  12.0 - 16.0 g/dL Final    Hematocrit 02/10/2021 41.6  36.0 - 48.0 % Final    MCV 02/10/2021 90.4  80.0 - 100.0 fL Final    MCH 02/10/2021 31.4  26.0 - 34.0 pg Final    MCHC 02/10/2021 34.7  31.0 - 36.0 g/dL Final    RDW 02/10/2021 12.8  12.4 - 15.4 % Final    Platelets 73/21/5226 164  135 - 450 K/uL Final    MPV 02/10/2021 9.9 5.0 - 10.5 fL Final    Sodium 02/10/2021 131* 136 - 145 mmol/L Final    Potassium reflex Magnesium 02/10/2021 4.0  3.5 - 5.1 mmol/L Final    Chloride 02/10/2021 100  99 - 110 mmol/L Final    CO2 02/10/2021 25  21 - 32 mmol/L Final    Anion Gap 02/10/2021 6  3 - 16 Final    Glucose 02/10/2021 80  70 - 99 mg/dL Final    BUN 02/10/2021 20  7 - 20 mg/dL Final    CREATININE 02/10/2021 0.6  0.6 - 1.1 mg/dL Final    GFR Non- 02/10/2021 >60  >60 Final    Comment: >60 mL/min/1.73m2 EGFR, calc. for ages 25 and older using the  MDRD formula (not corrected for weight), is valid for stable  renal function.  GFR  02/10/2021 >60  >60 Final    Comment: Chronic Kidney Disease: less than 60 ml/min/1.73 sq.m. Kidney Failure: less than 15 ml/min/1.73 sq.m. Results valid for patients 18 years and older.       Calcium 02/10/2021 9.0  8.3 - 10.6 mg/dL Final    Total Protein 02/10/2021 7.4  6.4 - 8.2 g/dL Final    Albumin 02/10/2021 4.5  3.4 - 5.0 g/dL Final    Albumin/Globulin Ratio 02/10/2021 1.6  1.1 - 2.2 Final    Total Bilirubin 02/10/2021 1.1* 0.0 - 1.0 mg/dL Final    Alkaline Phosphatase 02/10/2021 52  40 - 129 U/L Final    ALT 02/10/2021 51* 10 - 40 U/L Final    AST 02/10/2021 45* 15 - 37 U/L Final    Globulin 02/10/2021 2.9  g/dL Final    Ventricular Rate 02/10/2021 61  BPM Final    Atrial Rate 02/10/2021 61  BPM Final    P-R Interval 02/10/2021 156  ms Final    QRS Duration 02/10/2021 98  ms Final    Q-T Interval 02/10/2021 424  ms Final    QTc Calculation (Bazett) 02/10/2021 426  ms Final    P Axis 02/10/2021 65  degrees Final    R Axis 02/10/2021 43  degrees Final    T Axis 02/10/2021 48  degrees Final    Diagnosis 02/10/2021 Normal sinus rhythmNormal ECGNo previous ECGs availableConfirmed by Ann Barahona MD, RICHIE ([id number]) on 2/10/2021 1:09:37 PM   Final    ABO/Rh 02/10/2021 O POS   Final    Antibody Screen 02/10/2021 NEG   Final    Pregnancy, Urine 02/10/2021 Negative  Detects HCG level >20 MIU/mL Final    Comment: Note:  Always repeat results in question with a serum  quantitative pregnancy test. A serum hCG is positive  2-5 days before the urine hCG test.           EK/10/2021 QTc 426        Milka Baxter MD  Psychiatrist

## 2022-02-11 ENCOUNTER — OFFICE VISIT (OUTPATIENT)
Dept: PSYCHIATRY | Age: 32
End: 2022-02-11
Payer: COMMERCIAL

## 2022-02-11 VITALS
SYSTOLIC BLOOD PRESSURE: 140 MMHG | BODY MASS INDEX: 28.08 KG/M2 | RESPIRATION RATE: 12 BRPM | WEIGHT: 163.6 LBS | DIASTOLIC BLOOD PRESSURE: 105 MMHG | HEART RATE: 80 BPM

## 2022-02-11 DIAGNOSIS — F33.2 SEVERE EPISODE OF RECURRENT MAJOR DEPRESSIVE DISORDER, WITHOUT PSYCHOTIC FEATURES (HCC): ICD-10-CM

## 2022-02-11 DIAGNOSIS — F43.10 PTSD (POST-TRAUMATIC STRESS DISORDER): Primary | ICD-10-CM

## 2022-02-11 PROCEDURE — 99213 OFFICE O/P EST LOW 20 MIN: CPT | Performed by: STUDENT IN AN ORGANIZED HEALTH CARE EDUCATION/TRAINING PROGRAM

## 2022-02-11 PROCEDURE — 90833 PSYTX W PT W E/M 30 MIN: CPT | Performed by: STUDENT IN AN ORGANIZED HEALTH CARE EDUCATION/TRAINING PROGRAM

## 2022-02-11 RX ORDER — PRAZOSIN HYDROCHLORIDE 1 MG/1
1 CAPSULE ORAL NIGHTLY
Qty: 30 CAPSULE | Refills: 0 | Status: SHIPPED | OUTPATIENT
Start: 2022-02-11 | End: 2022-02-18

## 2022-02-11 RX ORDER — DULOXETIN HYDROCHLORIDE 60 MG/1
60 CAPSULE, DELAYED RELEASE ORAL DAILY
Qty: 30 CAPSULE | Refills: 2 | Status: SHIPPED | OUTPATIENT
Start: 2022-02-11 | End: 2022-03-07

## 2022-02-11 ASSESSMENT — ENCOUNTER SYMPTOMS
ALLERGIC/IMMUNOLOGIC NEGATIVE: 1
GASTROINTESTINAL NEGATIVE: 1
EYES NEGATIVE: 1
RESPIRATORY NEGATIVE: 1

## 2022-02-18 ENCOUNTER — TELEMEDICINE (OUTPATIENT)
Dept: PRIMARY CARE CLINIC | Age: 32
End: 2022-02-18
Payer: COMMERCIAL

## 2022-02-18 ENCOUNTER — NURSE TRIAGE (OUTPATIENT)
Dept: OTHER | Facility: CLINIC | Age: 32
End: 2022-02-18

## 2022-02-18 DIAGNOSIS — F17.210 CIGARETTE NICOTINE DEPENDENCE WITHOUT COMPLICATION: ICD-10-CM

## 2022-02-18 DIAGNOSIS — J20.9 ACUTE BRONCHITIS, UNSPECIFIED ORGANISM: Primary | ICD-10-CM

## 2022-02-18 PROBLEM — B19.20 VIRAL HEPATITIS C: Status: RESOLVED | Noted: 2018-05-10 | Resolved: 2022-02-18

## 2022-02-18 PROCEDURE — 99406 BEHAV CHNG SMOKING 3-10 MIN: CPT | Performed by: STUDENT IN AN ORGANIZED HEALTH CARE EDUCATION/TRAINING PROGRAM

## 2022-02-18 PROCEDURE — 99214 OFFICE O/P EST MOD 30 MIN: CPT | Performed by: STUDENT IN AN ORGANIZED HEALTH CARE EDUCATION/TRAINING PROGRAM

## 2022-02-18 RX ORDER — AMOXICILLIN 500 MG/1
500 CAPSULE ORAL 3 TIMES DAILY
Qty: 20 CAPSULE | Refills: 0 | Status: SHIPPED | OUTPATIENT
Start: 2022-02-18 | End: 2022-02-28

## 2022-02-18 RX ORDER — PREDNISONE 20 MG/1
40 TABLET ORAL DAILY
Qty: 5 TABLET | Refills: 0 | Status: SHIPPED | OUTPATIENT
Start: 2022-02-18 | End: 2022-02-23

## 2022-02-18 ASSESSMENT — ENCOUNTER SYMPTOMS
RHINORRHEA: 0
SINUS PRESSURE: 0
CHEST TIGHTNESS: 1
COUGH: 1
SINUS PAIN: 0
WHEEZING: 1
SHORTNESS OF BREATH: 1

## 2022-02-18 NOTE — TELEPHONE ENCOUNTER
Received call from Daniel Henning at [facility] with Red Flag Complaint. Subjective: Caller states \"I have been getting over a respiratory infection that I got sick with about a month ago, and I have a dry cough, short of breath and feel some wheezing. My symptoms were a little better while on the amoxicillin, but now they are back. \"     Current Symptoms: Dry cough, shortness of breath, wheezing    Onset: 1 month ago; unchanged    Associated Symptoms: reduced activity, difficulty sleeping     Pain Severity: 7/10; pressure; intermittent    Temperature: None at this time     What has been tried: Has tried sudafed and OTC mucus relief, and completed dose of Amoxicillin. LMP: 2/15/2022 Pregnant: No    Recommended disposition: See PCP in office today. If no available appts, advised patient to seek care in walk-in clinic or . Care advice provided, patient verbalizes understanding; denies any other questions or concerns; instructed to call back for any new or worsening symptoms. Patient/Caller agrees with recommended disposition; writer provided warm transfer to Price Elkins at [facility] for appointment scheduling     Attention Provider: Thank you for allowing me to participate in the care of your patient. The patient was connected to triage in response to information provided to the ECC/PSC. Please do not respond through this encounter as the response is not directed to a shared pool.     Reason for Disposition   Patient wants to be seen    Protocols used: BREATHING DIFFICULTY-ADULT-OH

## 2022-02-18 NOTE — PROGRESS NOTES
historically, last used in 2017. History of speedballing (heroin + cocaine). History of cocaine, LSD, methamphetamine, psilocybin, pain pills, and cannabis use. Reports being clean since 2017        Past Medical History:   Diagnosis Date    Depression     Genital herpes 4/62011    ist outbreak    Genital herpes     1st outbreak 4/11. Prophylaxis at 36wks. replace inactive diagnosis    Hepatitis A 04/17/2019    Hepatitis C     STD (sexually transmitted disease) 2009    GC/chlamydia    Substance abuse ([facility] [address]     HX IV heroin. last use 9/2016       PHYSICAL EXAMINATION:  There were no vitals taken for this visit. Wt Readings from Last 3 Encounters:   02/11/22 163 lb 9.6 oz (74.2 kg)   01/14/22 160 lb (72.6 kg)   12/21/21 162 lb 6.4 oz (73.7 kg)       [ INSTRUCTIONS:  \"[x]\" Indicates a positive item  \"[]\" Indicates a negative item  -- DELETE ALL ITEMS NOT EXAMINED]  [x] Alert  [x] Oriented to person/place/time    [x] No apparent distress  []  Appears Unwell:     [x] Breathing appears normal  [x] Coughing throughout      [] Rash on visible skin:    [x] Cranial Nerves II-XII grossly intact    [x] Motor grossly intact in visible upper extremities    [x] Motor grossly intact in visible lower extremities    [x] Normal Mood  [] Anxious appearing    [] Depressed appearing     [] OTHER:      Due to this being a TeleHealth encounter, evaluation of the following organ systems is limited: Vitals/Constitutional/EENT/Resp/CV/GI//MS/Neuro/Skin/Heme-Lymph-Imm.   Lab Results   Component Value Date     (L) 02/10/2021    K 4.0 02/10/2021     02/10/2021    CO2 25 02/10/2021    BUN 20 02/10/2021    CREATININE 0.6 02/10/2021    GLUCOSE 80 02/10/2021    CALCIUM 9.0 02/10/2021    PROT 7.4 02/10/2021    LABALBU 4.5 02/10/2021    BILITOT 1.1 (H) 02/10/2021    ALKPHOS 52 02/10/2021    AST 45 (H) 02/10/2021    ALT 51 (H) 02/10/2021    LABGLOM >60 02/10/2021    GFRAA >60 02/10/2021    AGRATIO 1.6 02/10/2021    GLOB 2.9 02/10/2021     No results found for: LABA1C  No results found for: EAG      ASSESSMENT/PLAN:  1. Acute bronchitis, unspecified organism:Given no fevers, sputum production will give steroids and hold off on antibiotics for now. Given amoxicillin with precautions for use. Follow-up as needed. - amoxicillin (AMOXIL) 500 MG capsule; Take 1 capsule by mouth 3 times daily for 10 days  Dispense: 20 capsule; Refill: 0  - predniSONE (DELTASONE) 20 MG tablet; Take 2 tablets by mouth daily for 5 days  Dispense: 5 tablet; Refill: 0    2. Cigarette nicotine dependence without complication: Discussed negative health effects of cigarette smoking in great detail with patient; including risks of developing future infections similar to this. Discussed different options for assistance with cessation, but patient is not interested today. > 3 min  - TX TOBACCO USE CESSATION INTERMEDIATE 3-10 MINUTES      No follow-ups on file. An  electronic signature was used to authenticate this note. --Alexandria Braswell DO on 2/18/2022 at 10:21 AM        Pursuant to the emergency declaration under the [facility], [facility] waiver authority and the [employer] Act, this Virtual  Visit was conducted, with patient's consent, to reduce the patient's risk of exposure to COVID-19 and provide continuity of care for an established patient. Services were provided through a video synchronous discussion virtually to substitute for in-person clinic visit.

## 2022-02-28 RX ORDER — PRAZOSIN HYDROCHLORIDE 1 MG/1
1 CAPSULE ORAL NIGHTLY
Qty: 30 CAPSULE | Refills: 0 | Status: SHIPPED | OUTPATIENT
Start: 2022-02-28 | End: 2022-03-16

## 2022-02-28 NOTE — TELEPHONE ENCOUNTER
Medication:   Requested Prescriptions     Pending Prescriptions Disp Refills    prazosin (MINIPRESS) 1 MG capsule [Pharmacy Med Name: PRAZOSIN 1 MG CAPSULE] 30 capsule 0     Sig: TAKE 1 CAPSULE BY MOUTH NIGHTLY TAKE WITH THE 2MG CAPSULE FOR A TOTAL OF 3MG NIGHTLY        Last Filled:      Patient Phone Number: [phone] (home)     Last appt: 2/11/2022   Next appt: 3/11/2022    Last OARRS:   RX Monitoring 2/10/2021   Acute Pain Prescriptions Severe pain not adequately treated with lower dose.

## 2022-03-03 ENCOUNTER — PATIENT MESSAGE (OUTPATIENT)
Dept: PRIMARY CARE CLINIC | Age: 32
End: 2022-03-03

## 2022-03-03 DIAGNOSIS — J20.9 ACUTE BRONCHITIS, UNSPECIFIED ORGANISM: Primary | ICD-10-CM

## 2022-03-04 RX ORDER — METHYLPREDNISOLONE 4 MG/1
TABLET ORAL
Qty: 1 KIT | Refills: 0 | Status: SHIPPED | OUTPATIENT
Start: 2022-03-04 | End: 2022-03-10

## 2022-03-04 NOTE — TELEPHONE ENCOUNTER
Please call patient and notify her that I have sent some additional steroids into her pharmacy. Schedule her an appointment for follow-up next week.

## 2022-03-04 NOTE — TELEPHONE ENCOUNTER
From: Tyler Desouza  To: Dr. Gaffney Dress: 3/3/2022 6:45 PM EST  Subject: Hard time breathing    I've taken the medications as directed from our last visit, but I am still having a hard time breathing in the morning especially and at night when sleeping. I've cut back smoking cigarettes to about 5 a day, trying to quit. Should I schedule an appointment? I've always had severe seasonal allergies accompanying hard time breathing through those months. I'm afraid having covid and bronchitis has formed some kind of asthma.

## 2022-03-05 DIAGNOSIS — F33.2 SEVERE EPISODE OF RECURRENT MAJOR DEPRESSIVE DISORDER, WITHOUT PSYCHOTIC FEATURES (HCC): ICD-10-CM

## 2022-03-07 RX ORDER — DULOXETIN HYDROCHLORIDE 60 MG/1
CAPSULE, DELAYED RELEASE ORAL
Qty: 30 CAPSULE | Refills: 2 | Status: SHIPPED | OUTPATIENT
Start: 2022-03-07 | End: 2022-04-25

## 2022-03-07 NOTE — TELEPHONE ENCOUNTER
Medication:   Requested Prescriptions     Pending Prescriptions Disp Refills    DULoxetine (CYMBALTA) 60 MG extended release capsule [Pharmacy Med Name: DULOXETINE HCL DR 60 MG CAP] 30 capsule 2     Sig: TAKE 1 CAPSULE BY MOUTH EVERY DAY        Last Filled:      Patient Phone Number: [phone] (home)     Last appt: 2/11/2022   Next appt: 3/18/2022    Last OARRS:   RX Monitoring 2/10/2021   Acute Pain Prescriptions Severe pain not adequately treated with lower dose.

## 2022-03-15 NOTE — PATIENT INSTRUCTIONS
Patient Education        Stopping Smoking: Care Instructions  Your Care Instructions     Cigarette smokers crave the nicotine in cigarettes. Giving it up is much harder than simply changing a habit. Your body has to stop craving the nicotine. It is hard to quit, but you can do it. There are many tools that people use to quit smoking. You may find that combining tools works best for you. There are several steps to quitting. First you get ready to quit. Then you get support to help you. After that, you learn new skills and behaviors to become a nonsmoker. For many people, a necessary step is getting and using medicine. Your doctor will help you set up the plan that best meets your needs. You may want to attend a smoking cessation program to help you quit smoking. When you choose a program, look for one that has proven success. Ask your doctor for ideas. You will greatly increase your chances of success if you take medicine as well as get counseling or join a cessation program.  Some of the changes you feel when you first quit tobacco are uncomfortable. Your body will miss the nicotine at first, and you may feel short-tempered and grumpy. You may have trouble sleeping or concentrating. Medicine can help you deal with these symptoms. You may struggle with changing your smoking habits and rituals. The last step is the tricky one: Be prepared for the smoking urge to continue for a time. This is a lot to deal with, but keep at it. You will feel better. Follow-up care is a key part of your treatment and safety. Be sure to make and go to all appointments, and call your doctor if you are having problems. It's also a good idea to know your test results and keep a list of the medicines you take. How can you care for yourself at home? · Ask your family, friends, and coworkers for support. You have a better chance of quitting if you have help and support.   · Join a support group, such as Nicotine Anonymous, for people who are trying to quit smoking. · Consider signing up for a smoking cessation program, such as the American Lung Association's Freedom from Smoking program.  · Get text messaging support. Go to the website at www.smokefree. gov to sign up for the [facility] program.  · Set a quit date. Pick your date carefully so that it is not right in the middle of a big deadline or stressful time. Once you quit, do not even take a puff. Get rid of all ashtrays and lighters after your last cigarette. Clean your house and your clothes so that they do not smell of smoke. · Learn how to be a nonsmoker. Think about ways you can avoid those things that make you reach for a cigarette. ? Avoid situations that put you at greatest risk for smoking. For some people, it is hard to have a drink with friends without smoking. For others, they might skip a coffee break with coworkers who smoke. ? Change your daily routine. Take a different route to work or eat a meal in a different place. · Cut down on stress. Calm yourself or release tension by doing an activity you enjoy, such as reading a book, taking a hot bath, or gardening. · Talk to your doctor or pharmacist about nicotine replacement therapy, which replaces the nicotine in your body. You still get nicotine but you do not use tobacco. Nicotine replacement products help you slowly reduce the amount of nicotine you need. These products come in several forms, many of them available over-the-counter:  ? Nicotine patches  ? Nicotine gum and lozenges  ? Nicotine inhaler  · Ask your doctor about bupropion (Wellbutrin) or varenicline (Chantix), which are prescription medicines. They do not contain nicotine. They help you by reducing withdrawal symptoms, such as stress and anxiety. · Some people find hypnosis, acupuncture, and massage helpful for ending the smoking habit. · Eat a healthy diet and get regular exercise.  Having healthy habits will help your body move past its craving for nicotine. · Be prepared to keep trying. Most people are not successful the first few times they try to quit. Do not get mad at yourself if you smoke again. Make a list of things you learned and think about when you want to try again, such as next week, next month, or next year. Where can you learn more? Go to https://Lezu365pemikeewrandy.Profind. org and sign in to your [employer] account. Enter R319 in the [employer] box to learn more about \"Stopping Smoking: Care Instructions. \"     If you do not have an account, please click on the \"Sign Up Now\" link. Current as of: February 11, 2021               Content Version: 13.1  © 2006-2021 Healthwise, Incorporated. Care instructions adapted under license by [facility] ([facility]). If you have questions about a medical condition or this instruction, always ask your healthcare professional. [address] any warranty or liability for your use of this information.

## 2022-03-15 NOTE — PROGRESS NOTES
3/16/2022     Kwaku Nowak (:  1990) is a 28 y.o. female, here for evaluation of the following medical concerns:    HPI  Shortness of breath:  Patient complains of a 8 week history of moderate dyspnea after 2 flights of stairs, chest tightness and non-productive cough . She denies purulent sputum, wheezing, chest tightness, chest pain, fever, nasal congestion. Patient is experiencing symptoms continuously. She has not had inhaler. Symptoms are worsening over time. Suspected triggers include airborne allergens; particularly she thinks animal dander. She  reports that she has been smoking cigarettes. She has a 12.00 pack-year smoking history. She has never used smokeless tobacco. Current treatment includes nothing. ED treatment for COPD symptoms:  No, but she did see me virtual for acute bronchitis. COPD-related hospitalization No. Prior PFTs: No.  Prior pneumococcal vaccination: No.  Current pulmonologist:  No.      Review of Systems   Constitutional: Negative for fatigue. HENT: Negative for congestion, rhinorrhea, sinus pressure, sinus pain, sore throat and trouble swallowing. Eyes: Negative for visual disturbance. Respiratory: Positive for cough, chest tightness and shortness of breath. Negative for wheezing. Cardiovascular: Negative for chest pain, palpitations and leg swelling. Gastrointestinal: Negative for abdominal pain. Endocrine: Negative for polydipsia, polyphagia and polyuria. Genitourinary: Negative for frequency. Skin: Negative for rash. Neurological: Negative for dizziness, syncope, weakness and light-headedness. Prior to Visit Medications    Medication Sig Taking?  Authorizing Provider   albuterol sulfate HFA (VENTOLIN HFA) 108 (90 Base) MCG/ACT inhaler Inhale 2 puffs into the lungs 4 times daily as needed for Wheezing Yes Mau Kim DO   DULoxetine (CYMBALTA) 60 MG extended release capsule TAKE 1 CAPSULE BY MOUTH EVERY DAY Yes Lin Hooks MD cetirizine (ZYRTEC) 10 MG tablet Take 10 mg by mouth daily Yes Historical Provider, MD        Social History     Tobacco Use    Smoking status: Current Every Day Smoker     Packs/day: 1.00     Years: 12.00     Pack years: 12.00     Types: Cigarettes    Smokeless tobacco: Never Used   Substance Use Topics    Alcohol use: Yes     Alcohol/week: 2.0 standard drinks     Types: 2 Standard drinks or equivalent per week     Comment: Previously engaged in heavier consumption        Vitals:    03/16/22 1052   BP: 127/82   Pulse: 89   Temp: 97.7 °F (36.5 °C)   TempSrc: Axillary   Weight: 161 lb 12.8 oz (73.4 kg)   Height: 5' 4\" (1.626 m)     Estimated body mass index is 27.77 kg/m² as calculated from the following:    Height as of this encounter: 5' 4\" (1.626 m). Weight as of this encounter: 161 lb 12.8 oz (73.4 kg). Physical Exam  Vitals reviewed. Constitutional:       Appearance: Normal appearance. She is normal weight. HENT:      Head: Normocephalic and atraumatic. Right Ear: Tympanic membrane, ear canal and external ear normal.      Left Ear: Tympanic membrane, ear canal and external ear normal.      Nose: Nose normal.   Eyes:      Extraocular Movements: Extraocular movements intact. Conjunctiva/sclera: Conjunctivae normal.   Cardiovascular:      Rate and Rhythm: Normal rate and regular rhythm. Pulses: Normal pulses. Heart sounds: Normal heart sounds. Pulmonary:      Effort: Pulmonary effort is normal.      Breath sounds: Normal breath sounds. Musculoskeletal:         General: Normal range of motion. Cervical back: Normal range of motion and neck supple. Skin:     General: Skin is warm and dry. Capillary Refill: Capillary refill takes less than 2 seconds. Findings: No rash. Neurological:      General: No focal deficit present. Mental Status: She is alert. Mental status is at baseline.    Psychiatric:         Mood and Affect: Mood normal. ASSESSMENT/PLAN:  1. Shortness of breath: Does have particularly impressive smoking history; however, COPD would still be pretty early. Concern is possible adult onset asthma. Checking PFTs. We will give her an albuterol to gauge response. Understands not to take this today for PFTs  - Full PFT Study With Bronchodilator; Future  - albuterol sulfate HFA (VENTOLIN HFA) 108 (90 Base) MCG/ACT inhaler; Inhale 2 puffs into the lungs 4 times daily as needed for Wheezing  Dispense: 54 g; Refill: 1    2. Cigarette nicotine dependence without complication: Discussed negative health effects of cigarette smoking in great detail with patient; including different options for cessation. She is weaned herself from 20 cigarettes/day to 5. Applauded her success and encouraged continuation. > 3 min  - IN TOBACCO USE CESSATION INTERMEDIATE 3-10 MINUTES    3. Need for prophylactic vaccination against Streptococcus pneumoniae (pneumococcus)  - Pneumococcal polysaccharide vaccine 23-valent greater than or equal to 3yo subcutaneous/IM      Return in about 4 weeks (around 4/13/2022) for PFTs. An electronic signature was used to authenticate this note.     --Vin Tsai DO on 3/16/2022 at 11:28 AM

## 2022-03-16 ENCOUNTER — OFFICE VISIT (OUTPATIENT)
Dept: PRIMARY CARE CLINIC | Age: 32
End: 2022-03-16
Payer: COMMERCIAL

## 2022-03-16 VITALS
TEMPERATURE: 97.7 F | HEART RATE: 89 BPM | HEIGHT: 64 IN | WEIGHT: 161.8 LBS | SYSTOLIC BLOOD PRESSURE: 127 MMHG | BODY MASS INDEX: 27.62 KG/M2 | DIASTOLIC BLOOD PRESSURE: 82 MMHG

## 2022-03-16 DIAGNOSIS — Z23 NEED FOR PROPHYLACTIC VACCINATION AGAINST STREPTOCOCCUS PNEUMONIAE (PNEUMOCOCCUS): ICD-10-CM

## 2022-03-16 DIAGNOSIS — F17.210 CIGARETTE NICOTINE DEPENDENCE WITHOUT COMPLICATION: ICD-10-CM

## 2022-03-16 DIAGNOSIS — R06.02 SHORTNESS OF BREATH: Primary | ICD-10-CM

## 2022-03-16 PROCEDURE — 90732 PPSV23 VACC 2 YRS+ SUBQ/IM: CPT | Performed by: STUDENT IN AN ORGANIZED HEALTH CARE EDUCATION/TRAINING PROGRAM

## 2022-03-16 PROCEDURE — 99406 BEHAV CHNG SMOKING 3-10 MIN: CPT | Performed by: STUDENT IN AN ORGANIZED HEALTH CARE EDUCATION/TRAINING PROGRAM

## 2022-03-16 PROCEDURE — 99214 OFFICE O/P EST MOD 30 MIN: CPT | Performed by: STUDENT IN AN ORGANIZED HEALTH CARE EDUCATION/TRAINING PROGRAM

## 2022-03-16 PROCEDURE — 90471 IMMUNIZATION ADMIN: CPT | Performed by: STUDENT IN AN ORGANIZED HEALTH CARE EDUCATION/TRAINING PROGRAM

## 2022-03-16 RX ORDER — ALBUTEROL SULFATE 90 UG/1
2 AEROSOL, METERED RESPIRATORY (INHALATION) 4 TIMES DAILY PRN
Qty: 54 G | Refills: 1 | Status: SHIPPED | OUTPATIENT
Start: 2022-03-16

## 2022-03-16 ASSESSMENT — ENCOUNTER SYMPTOMS
SHORTNESS OF BREATH: 1
RHINORRHEA: 0
SORE THROAT: 0
TROUBLE SWALLOWING: 0
WHEEZING: 0
COUGH: 1
SINUS PAIN: 0
SINUS PRESSURE: 0
ABDOMINAL PAIN: 0
CHEST TIGHTNESS: 1

## 2022-03-18 ENCOUNTER — OFFICE VISIT (OUTPATIENT)
Dept: PSYCHIATRY | Age: 32
End: 2022-03-18
Payer: COMMERCIAL

## 2022-03-18 VITALS
WEIGHT: 158 LBS | DIASTOLIC BLOOD PRESSURE: 103 MMHG | SYSTOLIC BLOOD PRESSURE: 153 MMHG | RESPIRATION RATE: 15 BRPM | HEART RATE: 80 BPM | BODY MASS INDEX: 27.12 KG/M2

## 2022-03-18 DIAGNOSIS — F40.10 SOCIAL ANXIETY DISORDER: ICD-10-CM

## 2022-03-18 DIAGNOSIS — F41.1 GENERALIZED ANXIETY DISORDER: ICD-10-CM

## 2022-03-18 DIAGNOSIS — F43.10 PTSD (POST-TRAUMATIC STRESS DISORDER): ICD-10-CM

## 2022-03-18 DIAGNOSIS — F33.2 SEVERE EPISODE OF RECURRENT MAJOR DEPRESSIVE DISORDER, WITHOUT PSYCHOTIC FEATURES (HCC): Primary | ICD-10-CM

## 2022-03-18 PROCEDURE — 99214 OFFICE O/P EST MOD 30 MIN: CPT | Performed by: STUDENT IN AN ORGANIZED HEALTH CARE EDUCATION/TRAINING PROGRAM

## 2022-03-18 PROCEDURE — 90833 PSYTX W PT W E/M 30 MIN: CPT | Performed by: STUDENT IN AN ORGANIZED HEALTH CARE EDUCATION/TRAINING PROGRAM

## 2022-03-18 RX ORDER — PRAZOSIN HYDROCHLORIDE 1 MG/1
1 CAPSULE ORAL NIGHTLY
Qty: 30 CAPSULE | Refills: 3 | Status: SHIPPED | OUTPATIENT
Start: 2022-03-18 | End: 2022-03-21 | Stop reason: SDUPTHER

## 2022-03-18 RX ORDER — DULOXETIN HYDROCHLORIDE 30 MG/1
30 CAPSULE, DELAYED RELEASE ORAL DAILY
Qty: 30 CAPSULE | Refills: 2 | Status: SHIPPED | OUTPATIENT
Start: 2022-03-18 | End: 2022-05-10 | Stop reason: SDUPTHER

## 2022-03-18 RX ORDER — PRAZOSIN HYDROCHLORIDE 2 MG/1
2 CAPSULE ORAL NIGHTLY
Qty: 30 CAPSULE | Refills: 3 | Status: SHIPPED | OUTPATIENT
Start: 2022-03-18 | End: 2022-03-21 | Stop reason: SDUPTHER

## 2022-03-18 ASSESSMENT — PATIENT HEALTH QUESTIONNAIRE - PHQ9
5. POOR APPETITE OR OVEREATING: 2
6. FEELING BAD ABOUT YOURSELF - OR THAT YOU ARE A FAILURE OR HAVE LET YOURSELF OR YOUR FAMILY DOWN: 2
SUM OF ALL RESPONSES TO PHQ QUESTIONS 1-9: 21
2. FEELING DOWN, DEPRESSED OR HOPELESS: 2
8. MOVING OR SPEAKING SO SLOWLY THAT OTHER PEOPLE COULD HAVE NOTICED. OR THE OPPOSITE, BEING SO FIGETY OR RESTLESS THAT YOU HAVE BEEN MOVING AROUND A LOT MORE THAN USUAL: 3
7. TROUBLE CONCENTRATING ON THINGS, SUCH AS READING THE NEWSPAPER OR WATCHING TELEVISION: 3
9. THOUGHTS THAT YOU WOULD BE BETTER OFF DEAD, OR OF HURTING YOURSELF: 1
SUM OF ALL RESPONSES TO PHQ QUESTIONS 1-9: 21
SUM OF ALL RESPONSES TO PHQ QUESTIONS 1-9: 21
3. TROUBLE FALLING OR STAYING ASLEEP: 3
4. FEELING TIRED OR HAVING LITTLE ENERGY: 3
SUM OF ALL RESPONSES TO PHQ9 QUESTIONS 1 & 2: 4
SUM OF ALL RESPONSES TO PHQ QUESTIONS 1-9: 20
1. LITTLE INTEREST OR PLEASURE IN DOING THINGS: 2

## 2022-03-18 ASSESSMENT — ENCOUNTER SYMPTOMS
RESPIRATORY NEGATIVE: 1
GASTROINTESTINAL NEGATIVE: 1
ALLERGIC/IMMUNOLOGIC NEGATIVE: 1
EYES NEGATIVE: 1

## 2022-03-18 ASSESSMENT — ANXIETY QUESTIONNAIRES
2. NOT BEING ABLE TO STOP OR CONTROL WORRYING: 2
4. TROUBLE RELAXING: 3
GAD7 TOTAL SCORE: 17
7. FEELING AFRAID AS IF SOMETHING AWFUL MIGHT HAPPEN: 2
5. BEING SO RESTLESS THAT IT IS HARD TO SIT STILL: 3
1. FEELING NERVOUS, ANXIOUS, OR ON EDGE: 3
3. WORRYING TOO MUCH ABOUT DIFFERENT THINGS: 3
6. BECOMING EASILY ANNOYED OR IRRITABLE: 1

## 2022-03-18 NOTE — PROGRESS NOTES
PSYCHIATRY PROGRESS NOTE    Zully Prater  1990  03/18/22  Face to Face time: Office visit +20 minutes of supportive psychotherapy  PCP: Jane Emery DO    CC:   Chief Complaint   Patient presents with    Anxiety    Depression     Patient is a 60-year-old female without significant past medical history who presents to the outpatient psychiatry clinic today for evaluation and management of depression and anxiety. A:  Patient's presentation today is indicative of continued difficulties with depression and anxiety secondary to ongoing stressors in her life. The patient found out just this morning that her daughter is having significant mental health issues of her own and is struggling to deal with this while also being  from her by a custody agreement. Visit today focused on supportive measures to help her deal with this crisis situation while also trying to treat her current symptomology as we move forward. Diagnosis:  PTSD, chronic  MDD R, severe without psychotic features (versus possible dysthymic disorder)  Generalized anxiety disorder  Social anxiety disorder  Borderline personality disorder  Opioid use disorder, severe, in sustained remission  Polysubstance use, in sustained remission (cocaine use, hallucinogen use, cannabis use, amphetamine use)    P:   1. We will plan to increase the patient's duloxetine from 60 mg daily to 90 mg daily. Patient was reminded of potential adverse effects of this medication that have been explained to her previously. 2.  We will plan to continue the patient on her current dose of prazosin 3 mg nightly for treatment of PTSD symptoms. Anticipate that at future visits we may need to increase this medication.       Medication Monitoring:    - PDMP reviewed: No interval change    Follow-up: 2 weeks    Safety: Pt was counseled on the potential for increased suicidal ideations and advised on potential options for dealing with these including hotlines, calling the office, or going to the nearest emergency room. __________________________________________________________________________    S:   Patient indicated that she was dealing with significant stress today. Though it was noted to be her birthday, the patient indicated that she has had 2 things that have markedly distressed her. Over the past several days she has had difficulties dealing with her daughter's father who has custody for the [de-identified] of the time of her daughter. The patient noted that he has been telling her that she needs to do more and she needs to care for the child even though the patient cannot do so by court order. Additionally, today the patient heard from her ask that her daughter endorsed suicidal ideations to a school counselor today. Patient does not know with quite happened from this and feels paralyzed from not being able to do anything to help her daughter at the moment. She notes increasing anxiety and a dread associated with answering the phone. The majority of the visit was spent in discussing different coping strategies that the patient could employ in order to help herself through this difficult time. One of the measures that was discussed was to avoid putting herself down any further, as the patient does have a tendency to do this. Patient identified a history of core ideations such as Lino Cram is always my fault\", notes that she frequently beat herself up further and that this extended all the way back into childhood. Patient indicated that her depression and anxiety remain uncontrolled with the current medication regimen. She acknowledged that the prazosin does not appear to have done a lot at his current dosage, however she does recognize that she is under a significant amount of stress that can reactivate things. She denied any SI/HI/AVH at the visit today. ROS:  Review of Systems   Constitutional: Negative. HENT: Negative. Eyes: Negative. Respiratory: Negative. Cardiovascular: Negative. Gastrointestinal: Negative. Endocrine: Negative. Genitourinary: Negative. Musculoskeletal: Negative. Skin: Negative. Allergic/Immunologic: Negative. Neurological: Positive for headaches. Hematological: Negative. Psychiatric/Behavioral: Positive for decreased concentration, dysphoric mood and sleep disturbance. The patient is nervous/anxious. Brief Medical Hx:   Patient Active Problem List   Diagnosis    [facility]ioid [facility], [facility], [facility]    Hepatitis C    Cigarette nicotine dependence without complication    Severe episode of recurrent major depressive disorder, without psychotic features ([facility]    PTSD (post-traumatic stress disorder)    Generalized anxiety disorder    Social anxiety disorder    Borderline personality disorder ([facility]    [facility] ([facility]        Brief Psych Hx:  Hosp: 2 prior hospitalizations in her teenage years  [address]  Outpt: Previously done therapy but not currently.  Previously had outpatient psychiatry help but not recently  NSSI: History of cutting behaviors during her teenage years. Raoul Patel has not engaged in this in greater than 10 years  Suicide Attempts: 4-5 suicide attempts. Audery Needles attempt was at age 17.  All attempts were done using overdoses of heroin    O:  Wt Readings from Last 3 Encounters:   03/18/22 158 lb (71.7 kg)   03/16/22 161 lb 12.8 oz (73.4 kg)   02/11/22 163 lb 9.6 oz (74.2 kg)       Vitals:    03/18/22 1230   BP: (!) 153/103   Pulse: 80   Resp: 15   Weight: 158 lb (71.7 kg)       Mental Status Exam:   Appearance:    Appropriately dressed  Motor: No abnormal movements, tics or mannerisms. Eye Contact: Fair  Speech:    Appropriate rate and rhythm.   Quiet  Language:   Appropriate diction  Mood/Affect: \"I just do not know what to do\"/blunted and anxious  Thought Process:   Generally linear, logical  Thought Content:    Depressive and anxious content predominating, no SI/HI voiced  Hallucinations:   Denied, not appearing to be responding to internal stimuli  Associations:   Intact  Attention/Concentration:   Intact  Orientation:    Alert and oriented x4  Memory:   Intact  Fund of Knowledge:    Appropriate for age and education  Insight/Judgement:   Intact/intact      ARTEMIO-7 SCREENING 3/18/2022 2/9/2022   Feeling nervous, anxious, or on edge Nearly every day -   Not being able to stop or control worrying More than half the days -   Worrying too much about different things Nearly every day -   Trouble relaxing Nearly every day -   Being so restless that it is hard to sit still Nearly every day -   Becoming easily annoyed or irritable Several days -   Feeling afraid as if something awful might happen More than half the days -   ARTEMIO-7 Total Score 17 -   How difficult have these problems made it for you to do your work, take care of things at home, or get along with other people? - -   Feeling nervous, anxious, or on edge - 2-Over half the days   Not able to stop or control worrying - 2-Over half the days   Worrying too much about different things - 3-Nearly every day   Trouble relaxing - 3-Nearly every day   Being so restless that it's hard to sit still - 3-Nearly every day   Becoming easily annoyed or irritable - 2-Over half the days   Feeling afraid as if something awful might happen - 3-Nearly every day   ARTEMIO-7 Total Score - 18     PHQ-9 Questionaire 3/18/2022 2/9/2022   Little interest or pleasure in doing things 2 2   Feeling down, depressed, or hopeless 2 2   Trouble falling or staying asleep, or sleeping too much 3 3   Feeling tired or having little energy 3 3   Poor appetite or overeating 2 1   Feeling bad about yourself - or that you are a failure or have let yourself or your family down 2 2   Trouble concentrating on things, such as reading the newspaper or watching television 3 3   Moving or speaking so slowly that other people could have noticed. Or the opposite - being so fidgety or restless that you have been moving around a lot more than usual 3 3   Thoughts that you would be better off dead, or of hurting yourself in some way 1 0   PHQ-9 Total Score 21 19   If you checked off any problems, how difficult have these problems made it for you to do your work, take care of things at home, or get along with other people? - 2       Labs:     Admission on 02/10/2021, Discharged on 02/10/2021   Component Date Value Ref Range Status    WBC 02/10/2021 5.3  4.0 - 11.0 K/uL Final    RBC 02/10/2021 4.60  4.00 - 5.20 M/uL Final    Hemoglobin 02/10/2021 14.4  12.0 - 16.0 g/dL Final    Hematocrit 02/10/2021 41.6  36.0 - 48.0 % Final    MCV 02/10/2021 90.4  80.0 - 100.0 fL Final    MCH 02/10/2021 31.4  26.0 - 34.0 pg Final    MCHC 02/10/2021 34.7  31.0 - 36.0 g/dL Final    RDW 02/10/2021 12.8  12.4 - 15.4 % Final    Platelets 52/81/7050 164  135 - 450 K/uL Final    MPV 02/10/2021 9.9  5.0 - 10.5 fL Final    Sodium 02/10/2021 131* 136 - 145 mmol/L Final    Potassium reflex Magnesium 02/10/2021 4.0  3.5 - 5.1 mmol/L Final    Chloride 02/10/2021 100  99 - 110 mmol/L Final    CO2 02/10/2021 25  21 - 32 mmol/L Final    Anion Gap 02/10/2021 6  3 - 16 Final    Glucose 02/10/2021 80  70 - 99 mg/dL Final    BUN 02/10/2021 20  7 - 20 mg/dL Final    CREATININE 02/10/2021 0.6  0.6 - 1.1 mg/dL Final    GFR Non- 02/10/2021 >60  >60 Final    Comment: >60 mL/min/1.73m2 EGFR, calc. for ages 25 and older using the  MDRD formula (not corrected for weight), is valid for stable  renal function.  GFR  02/10/2021 >60  >60 Final    Comment: Chronic Kidney Disease: less than 60 ml/min/1.73 sq.m. Kidney Failure: less than 15 ml/min/1.73 sq.m. Results valid for patients 18 years and older.       Calcium 02/10/2021 9.0  8.3 - 10.6 mg/dL Final    Total Protein 02/10/2021 7.4 6.4 - 8.2 g/dL Final    Albumin 02/10/2021 4.5  3.4 - 5.0 g/dL Final    Albumin/Globulin Ratio 02/10/2021 1.6  1.1 - 2.2 Final    Total Bilirubin 02/10/2021 1.1* 0.0 - 1.0 mg/dL Final    Alkaline Phosphatase 02/10/2021 52  40 - 129 U/L Final    ALT 02/10/2021 51* 10 - 40 U/L Final    AST 02/10/2021 45* 15 - 37 U/L Final    Globulin 02/10/2021 2.9  g/dL Final    Ventricular Rate 02/10/2021 61  BPM Final    Atrial Rate 02/10/2021 61  BPM Final    P-R Interval 02/10/2021 156  ms Final    QRS Duration 02/10/2021 98  ms Final    Q-T Interval 02/10/2021 424  ms Final    QTc Calculation (Bazett) 02/10/2021 426  ms Final    P Axis 02/10/2021 65  degrees Final    R Axis 02/10/2021 43  degrees Final    T Axis 02/10/2021 48  degrees Final    Diagnosis 02/10/2021 Normal sinus rhythmNormal ECGNo previous ECGs availableConfirmed by Skip Delacruz MD, [facility] ([id number]) on 2/10/2021 1:09:37 PM   Final    ABO/Rh 02/10/2021 O POS   Final    Antibody Screen 02/10/2021 NEG   Final    Pregnancy, Urine 02/10/2021 Negative  Detects HCG level >20 MIU/mL Final    Comment: Note:  Always repeat results in question with a serum  quantitative pregnancy test. A serum hCG is positive  2-5 days before the urine hCG test.         EK/10/2021 QTc 426      Eloina Holland MD  Psychiatrist

## 2022-03-21 ENCOUNTER — PATIENT MESSAGE (OUTPATIENT)
Dept: PSYCHIATRY | Age: 32
End: 2022-03-21

## 2022-03-21 RX ORDER — PRAZOSIN HYDROCHLORIDE 1 MG/1
1 CAPSULE ORAL NIGHTLY
Qty: 30 CAPSULE | Refills: 3 | Status: SHIPPED | OUTPATIENT
Start: 2022-03-21 | End: 2022-04-05

## 2022-03-21 RX ORDER — PRAZOSIN HYDROCHLORIDE 2 MG/1
2 CAPSULE ORAL NIGHTLY
Qty: 30 CAPSULE | Refills: 3 | Status: SHIPPED | OUTPATIENT
Start: 2022-03-21 | End: 2022-04-05

## 2022-04-05 ENCOUNTER — HOSPITAL ENCOUNTER (OUTPATIENT)
Dept: PULMONOLOGY | Age: 32
Discharge: HOME | End: 2022-04-05
Payer: COMMERCIAL

## 2022-04-05 ENCOUNTER — OFFICE VISIT (OUTPATIENT)
Dept: PSYCHIATRY | Age: 32
End: 2022-04-05
Payer: COMMERCIAL

## 2022-04-05 VITALS
WEIGHT: 160.2 LBS | HEART RATE: 74 BPM | DIASTOLIC BLOOD PRESSURE: 93 MMHG | BODY MASS INDEX: 27.5 KG/M2 | RESPIRATION RATE: 12 BRPM | SYSTOLIC BLOOD PRESSURE: 146 MMHG

## 2022-04-05 DIAGNOSIS — R06.02 SHORTNESS OF BREATH: ICD-10-CM

## 2022-04-05 DIAGNOSIS — F33.2 SEVERE EPISODE OF RECURRENT MAJOR DEPRESSIVE DISORDER, WITHOUT PSYCHOTIC FEATURES (HCC): ICD-10-CM

## 2022-04-05 DIAGNOSIS — F43.10 PTSD (POST-TRAUMATIC STRESS DISORDER): Primary | ICD-10-CM

## 2022-04-05 DIAGNOSIS — F41.1 GENERALIZED ANXIETY DISORDER: ICD-10-CM

## 2022-04-05 DIAGNOSIS — F60.3 BORDERLINE PERSONALITY DISORDER (HCC): ICD-10-CM

## 2022-04-05 DIAGNOSIS — F40.10 SOCIAL ANXIETY DISORDER: ICD-10-CM

## 2022-04-05 LAB
DLCO %PRED: 87 %
DLCO PRED: NORMAL
DLCO/VA %PRED: NORMAL
DLCO/VA PRED: NORMAL
DLCO/VA: NORMAL
DLCO: NORMAL
EXPIRATORY TIME-POST: NORMAL
EXPIRATORY TIME: NORMAL
FEF 25-75% %CHNG: NORMAL
FEF 25-75% %PRED-POST: NORMAL
FEF 25-75% %PRED-PRE: NORMAL
FEF 25-75% PRED: NORMAL
FEF 25-75%-POST: NORMAL
FEF 25-75%-PRE: NORMAL
FEV1 %PRED-POST: 107 %
FEV1 %PRED-PRE: 102 %
FEV1 PRED: NORMAL
FEV1-POST: NORMAL
FEV1-PRE: NORMAL
FEV1/FVC %PRED-POST: NORMAL
FEV1/FVC %PRED-PRE: NORMAL
FEV1/FVC PRED: NORMAL
FEV1/FVC-POST: 97 %
FEV1/FVC-PRE: 88 %
FVC %PRED-POST: NORMAL
FVC %PRED-PRE: NORMAL
FVC PRED: NORMAL
FVC-POST: NORMAL
FVC-PRE: NORMAL
GAW %PRED: NORMAL
GAW PRED: NORMAL
GAW: NORMAL
IC %PRED: NORMAL
IC PRED: NORMAL
IC: NORMAL
MEP: NORMAL
MIP: NORMAL
MVV %PRED-PRE: NORMAL
MVV PRED: NORMAL
MVV-PRE: NORMAL
PEF %PRED-POST: NORMAL
PEF %PRED-PRE: NORMAL
PEF PRED: NORMAL
PEF%CHNG: NORMAL
PEF-POST: NORMAL
PEF-PRE: NORMAL
RAW %PRED: NORMAL
RAW PRED: NORMAL
RAW: NORMAL
RV %PRED: NORMAL
RV PRED: NORMAL
RV: NORMAL
SVC %PRED: NORMAL
SVC PRED: NORMAL
SVC: NORMAL
TLC %PRED: 102 %
TLC PRED: NORMAL
TLC: NORMAL
VA %PRED: NORMAL
VA PRED: NORMAL
VA: NORMAL
VTG %PRED: NORMAL
VTG PRED: NORMAL
VTG: NORMAL

## 2022-04-05 PROCEDURE — 94640 AIRWAY INHALATION TREATMENT: CPT

## 2022-04-05 PROCEDURE — 99214 OFFICE O/P EST MOD 30 MIN: CPT | Performed by: STUDENT IN AN ORGANIZED HEALTH CARE EDUCATION/TRAINING PROGRAM

## 2022-04-05 PROCEDURE — 94726 PLETHYSMOGRAPHY LUNG VOLUMES: CPT

## 2022-04-05 PROCEDURE — 6370000000 HC RX 637 (ALT 250 FOR IP): Performed by: STUDENT IN AN ORGANIZED HEALTH CARE EDUCATION/TRAINING PROGRAM

## 2022-04-05 PROCEDURE — 94060 EVALUATION OF WHEEZING: CPT

## 2022-04-05 PROCEDURE — 94729 DIFFUSING CAPACITY: CPT

## 2022-04-05 RX ORDER — ALBUTEROL SULFATE 90 UG/1
4 AEROSOL, METERED RESPIRATORY (INHALATION) ONCE
Status: COMPLETED | OUTPATIENT
Start: 2022-04-05 | End: 2022-04-05

## 2022-04-05 RX ORDER — PRAZOSIN HYDROCHLORIDE 5 MG/1
5 CAPSULE ORAL NIGHTLY
Qty: 30 CAPSULE | Refills: 3 | Status: SHIPPED | OUTPATIENT
Start: 2022-04-05 | End: 2022-08-05 | Stop reason: SDUPTHER

## 2022-04-05 RX ADMIN — ALBUTEROL SULFATE 4 PUFF: 90 AEROSOL, METERED RESPIRATORY (INHALATION) at 07:50

## 2022-04-05 ASSESSMENT — ANXIETY QUESTIONNAIRES
4. TROUBLE RELAXING: 3
2. NOT BEING ABLE TO STOP OR CONTROL WORRYING: 2
6. BECOMING EASILY ANNOYED OR IRRITABLE: 2
5. BEING SO RESTLESS THAT IT IS HARD TO SIT STILL: 3
1. FEELING NERVOUS, ANXIOUS, OR ON EDGE: 2
7. FEELING AFRAID AS IF SOMETHING AWFUL MIGHT HAPPEN: 2
GAD7 TOTAL SCORE: 16
3. WORRYING TOO MUCH ABOUT DIFFERENT THINGS: 2

## 2022-04-05 ASSESSMENT — PATIENT HEALTH QUESTIONNAIRE - PHQ9
SUM OF ALL RESPONSES TO PHQ QUESTIONS 1-9: 17
4. FEELING TIRED OR HAVING LITTLE ENERGY: 2
9. THOUGHTS THAT YOU WOULD BE BETTER OFF DEAD, OR OF HURTING YOURSELF: 1
2. FEELING DOWN, DEPRESSED OR HOPELESS: 2
1. LITTLE INTEREST OR PLEASURE IN DOING THINGS: 1
8. MOVING OR SPEAKING SO SLOWLY THAT OTHER PEOPLE COULD HAVE NOTICED. OR THE OPPOSITE, BEING SO FIGETY OR RESTLESS THAT YOU HAVE BEEN MOVING AROUND A LOT MORE THAN USUAL: 3
7. TROUBLE CONCENTRATING ON THINGS, SUCH AS READING THE NEWSPAPER OR WATCHING TELEVISION: 3
SUM OF ALL RESPONSES TO PHQ9 QUESTIONS 1 & 2: 3
6. FEELING BAD ABOUT YOURSELF - OR THAT YOU ARE A FAILURE OR HAVE LET YOURSELF OR YOUR FAMILY DOWN: 2
SUM OF ALL RESPONSES TO PHQ QUESTIONS 1-9: 17
SUM OF ALL RESPONSES TO PHQ QUESTIONS 1-9: 16
10. IF YOU CHECKED OFF ANY PROBLEMS, HOW DIFFICULT HAVE THESE PROBLEMS MADE IT FOR YOU TO DO YOUR WORK, TAKE CARE OF THINGS AT HOME, OR GET ALONG WITH OTHER PEOPLE: 2
3. TROUBLE FALLING OR STAYING ASLEEP: 2
5. POOR APPETITE OR OVEREATING: 1
SUM OF ALL RESPONSES TO PHQ QUESTIONS 1-9: 17

## 2022-04-05 ASSESSMENT — ENCOUNTER SYMPTOMS
GASTROINTESTINAL NEGATIVE: 1
EYES NEGATIVE: 1
ALLERGIC/IMMUNOLOGIC NEGATIVE: 1
RESPIRATORY NEGATIVE: 1

## 2022-04-05 ASSESSMENT — PULMONARY FUNCTION TESTS
FEV1_PERCENT_PREDICTED_POST: 107
FEV1_PERCENT_PREDICTED_PRE: 102
FEV1/FVC_POST: 97
FEV1/FVC_PRE: 88

## 2022-04-05 NOTE — PROGRESS NOTES
PSYCHIATRY PROGRESS NOTE    Marylee Colas Corinna Legions  1990  04/05/22  Face to Face time: 30 minutes  PCP: Rick Borjas DO    CC:   Chief Complaint   Patient presents with    Anxiety    Depression     Patient is a 25-year-old female without significant past medical history who presents to the outpatient psychiatry clinic today for evaluation and management of depression and anxiety. A:  Patient presentation today is consistent with intermittent improvement of her depression and anxiety with a small decrease to her stress level. Her PTSD sx remain incompletely treated at present and do merit additional medication support. Diagnosis:  PTSD, chronic  MDD R, severe without psychotic features (versus possible dysthymic disorder)  Generalized anxiety disorder  Social anxiety disorder  Borderline personality disorder  Opioid use disorder, severe, in sustained remission  Polysubstance use, in sustained remission (cocaine use, hallucinogen use, cannabis use, amphetamine use)    P:   1. We will plan to remove crease the patient's prazosin from 3 mg nightly to 5 mg nightly for PTSD. Patient was cautioned regarding adverse effects of this medication that she had been informed of previously. 2.  We will plan to maintain the patient on her additional psychiatric medication of duloxetine 90 mg daily. Medication Monitoring:    - PDMP reviewed: No interval change    Follow-up: 4 weeks    Safety: Pt was counseled on the potential for increased suicidal ideations and advised on potential options for dealing with these including hotlines, calling the office, or going to the nearest emergency room. __________________________________________________________________________    S:   Patient acknowledges some things were getting better and some things have not gotten changed. She noted that her daughter's mental health has gotten a little bit better the last office visit and that this has helped her.   Daughter is currently staying with her over spring break but will be going over to grandma's house on dad's side per the custody arrangement afterwards. Patient noted that CPS had been out and had spoken to her, were rather supportive and understanding of her situation which she found to be helpful. Everybody in the situation seems to recognize that they are trapped by the court order and are waiting for the summer to happen. From a depression and anxiety standpoint, the patient identifies that her depression has gotten a little bit better. She notes that she is not as anatomic towards doing enjoyed activities. She is getting out and doing things such as playing video games with her children which she finds to be fun and enjoyable. Patient identified that she is now more forward thinking, thinking about making plans and possibly even enjoying them over the course of the summer. She knows that she is not sad all the time. She does still endorse symptoms of PTSD including hypervigilance (but lower), intrusive memories (more controllable), and nightmares (increased with recent stresses from her ex). Patient denied any SI/HI/AVH on evaluation. ROS:  Review of Systems   Constitutional: Negative. HENT: Negative. Eyes: Negative. Respiratory: Negative. Cardiovascular: Negative. Gastrointestinal: Negative. Endocrine: Negative. Genitourinary: Negative. Musculoskeletal: Negative. Skin: Negative. Allergic/Immunologic: Negative. Neurological: Negative. Hematological: Negative. Psychiatric/Behavioral: Positive for decreased concentration, dysphoric mood and sleep disturbance. The patient is nervous/anxious.          Brief Medical Hx:   Patient Active Problem List   Diagnosis    Opioid use disorder, severe, in sustained remission ([facility]    Hepatitis C    Cigarette nicotine dependence without complication    Severe episode of recurrent major depressive disorder, without psychotic features ([facility]    PTSD (post-traumatic stress disorder)    Generalized anxiety disorder    Social anxiety disorder    Borderline personality disorder ([facility]    Polysubstance abuse ([facility]        Brief Psych Hx:  Hosp: 2 prior hospitalizations in her teenage years  [address]  Outpt: Previously done therapy but not currently.  Previously had outpatient psychiatry help but not recently  NSSI: History of cutting behaviors during her teenage years. Vel Magaña has not engaged in this in greater than 10 years  Suicide Attempts: 4-5 suicide attempts. Carolina Chiles attempt was at age 17.  All attempts were done using overdoses of heroin    O:  Wt Readings from Last 3 Encounters:   04/05/22 160 lb 3.2 oz (72.7 kg)   03/18/22 158 lb (71.7 kg)   03/16/22 161 lb 12.8 oz (73.4 kg)       Vitals:    04/05/22 1230   BP: (!) 146/93   Pulse: 74   Resp: 12   Weight: 160 lb 3.2 oz (72.7 kg)       Mental Status Exam:   Appearance:    Appropriately dressed  Motor: No abnormal movements, tics or mannerisms.   Eye Contact: Good  Speech:    Better rate and rhythm, improved volume  Language:   Appropriate diction  Mood/Affect: \"I feel little better\"/brighter affect than previously seen but still mildly blunted  Thought Process:   Linear, logical  Thought Content:    Depressive content predominating, no SI/HI  Hallucinations:   Denied, not seem to be responding to internal stimuli  Associations:   Intact  Attention/Concentration:   Intact  Orientation:    Alert and oriented x4  Memory:   Intact  Fund of Knowledge:    Appropriate for age and education  Insight/Judgement:   Intact/intact      ARTEMIO-7 SCREENING 4/5/2022 3/18/2022   Feeling nervous, anxious, or on edge More than half the days Nearly every day   Not being able to stop or control worrying More than half the days More than half the days   Worrying too much about different things More than half the days Nearly every day   Trouble relaxing Nearly every day Nearly every day   Being so restless that it is hard to sit still Nearly every day Nearly every day   Becoming easily annoyed or irritable More than half the days Several days   Feeling afraid as if something awful might happen More than half the days More than half the days   ARTEMIO-7 Total Score 16 17   How difficult have these problems made it for you to do your work, take care of things at home, or get along with other people? - -   Feeling nervous, anxious, or on edge - -   Not able to stop or control worrying - -   Worrying too much about different things - -   Trouble relaxing - -   Being so restless that it's hard to sit still - -   Becoming easily annoyed or irritable - -   Feeling afraid as if something awful might happen - -   ARTEMIO-7 Total Score - -     PHQ-9 Questionaire 4/5/2022 3/18/2022   Little interest or pleasure in doing things 1 2   Feeling down, depressed, or hopeless 2 2   Trouble falling or staying asleep, or sleeping too much 2 3   Feeling tired or having little energy 2 3   Poor appetite or overeating 1 2   Feeling bad about yourself - or that you are a failure or have let yourself or your family down 2 2   Trouble concentrating on things, such as reading the newspaper or watching television 3 3   Moving or speaking so slowly that other people could have noticed. Or the opposite - being so fidgety or restless that you have been moving around a lot more than usual 3 3   Thoughts that you would be better off dead, or of hurting yourself in some way 1 1   PHQ-9 Total Score 17 21   If you checked off any problems, how difficult have these problems made it for you to do your work, take care of things at home, or get along with other people?  2 -          Labs:     Hospital Outpatient Visit on 04/05/2022   Component Date Value Ref Range Status    FEV1 %Pred-Pre 04/05/2022 102  % Final    FEV1 %Pred-Post 04/05/2022 107  % Final    FEV1/FVC-Pre 04/05/2022 88  % Final    FEV1/FVC-Post 04/05/2022 97  % Final    DLCO %Pred 2022 87  % Final    TLC %Pred 2022 102  % Final         EK/10/2021 QTc 426        Luanne Ayala MD  Psychiatrist

## 2022-04-06 PROCEDURE — 94060 EVALUATION OF WHEEZING: CPT | Performed by: INTERNAL MEDICINE

## 2022-04-06 PROCEDURE — 94726 PLETHYSMOGRAPHY LUNG VOLUMES: CPT | Performed by: INTERNAL MEDICINE

## 2022-04-06 PROCEDURE — 94729 DIFFUSING CAPACITY: CPT | Performed by: INTERNAL MEDICINE

## 2022-04-06 NOTE — PROCEDURES
Reason for PFT:  SOB    Spirometry  1. Spirometry is normal.  2. There is no demonstrable obstructive defect. Lung Volumes  3. Lung volumes are normal.    Bronchodilator  1. There is no response to bronchodilator demonstrated. [Increase in FEV1 and/or FVC => 12% of control and => 200 ml]    Flow-Volume Loop  4. The flow-volume loop is normal.    Diffusing Capacity  5. Diffusing capacity is normal.      Overall Interpretation  No obstruction is present    No restriction is present    There is not a significant bronchodilator response present    Diffusion capacity is normal    FEV1 is 3.38 L at 102% predicted. FVC is 4.50 L at 114% predicted    FEV1/FVC ratio is 75    Normal study. Bronchodilator response in mid-flows may suggest very early obstructive lung disease. OBSTRUCTION % Predicted FEV1   MILD >70%   MODERATE 60-69%   MODERATELY-SEVERE 50-59%   SEVERE 35-49%   VERY SEVERE <35%         RESTRICTION % Predicted TLC   MILD Less than LLN but > than 70%   MODERATE 60-69%   MODERATELY-SEVERE <60%                 DIFFUSION CAPACITY DL,CO % Pred   MILD >60% AND < LLN   MODERATE 40-60%   SEVERE <40%       PFT data will be scanned into the media tab in epic.     Stevie Mancilla [facility] Pulmonology

## 2022-04-14 RX ORDER — DULOXETIN HYDROCHLORIDE 30 MG/1
30 CAPSULE, DELAYED RELEASE ORAL DAILY
Qty: 30 CAPSULE | Refills: 2 | OUTPATIENT
Start: 2022-04-14

## 2022-04-21 ENCOUNTER — OFFICE VISIT (OUTPATIENT)
Dept: PRIMARY CARE CLINIC | Age: 32
End: 2022-04-21
Payer: COMMERCIAL

## 2022-04-21 VITALS
DIASTOLIC BLOOD PRESSURE: 94 MMHG | BODY MASS INDEX: 27.19 KG/M2 | WEIGHT: 158.4 LBS | SYSTOLIC BLOOD PRESSURE: 140 MMHG | TEMPERATURE: 97.3 F | HEART RATE: 93 BPM

## 2022-04-21 DIAGNOSIS — R03.0 ELEVATED BLOOD PRESSURE READING IN OFFICE WITHOUT DIAGNOSIS OF HYPERTENSION: ICD-10-CM

## 2022-04-21 DIAGNOSIS — R06.02 SHORTNESS OF BREATH: Primary | ICD-10-CM

## 2022-04-21 PROCEDURE — 99214 OFFICE O/P EST MOD 30 MIN: CPT | Performed by: FAMILY MEDICINE

## 2022-04-21 NOTE — PROGRESS NOTES
04/05/22 160 lb 3.2 oz (72.7 kg)   03/18/22 158 lb (71.7 kg)       Physical Exam  Constitutional:       Appearance: She is well-developed. Cardiovascular:      Rate and Rhythm: Normal rate and regular rhythm. Heart sounds: No murmur heard. No friction rub. No gallop. Pulmonary:      Effort: Pulmonary effort is normal.      Breath sounds: Normal breath sounds. No wheezing or rales. Abdominal:      General: Bowel sounds are normal. There is no distension. Palpations: Abdomen is soft. There is no mass. Tenderness: There is no abdominal tenderness. Skin:     General: Skin is warm and dry. Findings: No rash. Chemistry        Component Value Date/Time     (L) 02/10/2021 1009    K 4.0 02/10/2021 1009     02/10/2021 1009    CO2 25 02/10/2021 1009    BUN 20 02/10/2021 1009    CREATININE 0.6 02/10/2021 1009        Component Value Date/Time    CALCIUM 9.0 02/10/2021 1009    ALKPHOS 52 02/10/2021 1009    AST 45 (H) 02/10/2021 1009    ALT 51 (H) 02/10/2021 1009    BILITOT 1.1 (H) 02/10/2021 1009          Lab Results   Component Value Date    WBC 5.3 02/10/2021    HGB 14.4 02/10/2021    HCT 41.6 02/10/2021    MCV 90.4 02/10/2021     02/10/2021     No results found for: LABA1C  No results found for: EAG  No results found for: LABA1C  No components found for: CHLPL  Lab Results   Component Value Date    TRIG 139 04/18/2019     No results found for: HDL  No results found for: LDLCALC  No results found for: LABVLDL      Assessment   Plan   1. Shortness of breath  Reviewed pulmonary function tests with patient. She may have very early obstructive lung disease. Also possible is mild intermittent asthma or exercise-induced asthma. Counseled patient to use her albuterol inhaler prior to exercise and monitor other usage when she is using it for rescue. If more than twice a week she is to follow-up with us. She may need controller medicine in that event    2.  Elevated blood pressure reading in office without diagnosis of hypertension  Counseled patient on DASH diet. Return to clinic in 6 months for follow-up      Kamilah Massey received counseling on the following healthy behaviors: nutrition    Patient given educational materials on Nutrition    Discussed use, benefit, and side effects of prescribed medications. Barriers to medication compliance addressed. All patient questions answered. Pt voiced understanding. RTC Return in about 6 months (around 10/21/2022).

## 2022-04-24 DIAGNOSIS — F33.2 SEVERE EPISODE OF RECURRENT MAJOR DEPRESSIVE DISORDER, WITHOUT PSYCHOTIC FEATURES (HCC): ICD-10-CM

## 2022-04-25 RX ORDER — DULOXETIN HYDROCHLORIDE 60 MG/1
CAPSULE, DELAYED RELEASE ORAL
Qty: 90 CAPSULE | Refills: 2 | Status: SHIPPED | OUTPATIENT
Start: 2022-04-25 | End: 2022-08-05 | Stop reason: SDUPTHER

## 2022-05-04 RX ORDER — PRAZOSIN HYDROCHLORIDE 5 MG/1
CAPSULE ORAL
Qty: 30 CAPSULE | Refills: 3 | OUTPATIENT
Start: 2022-05-04

## 2022-05-10 ENCOUNTER — OFFICE VISIT (OUTPATIENT)
Dept: PSYCHIATRY | Age: 32
End: 2022-05-10
Payer: COMMERCIAL

## 2022-05-10 VITALS
RESPIRATION RATE: 12 BRPM | DIASTOLIC BLOOD PRESSURE: 94 MMHG | WEIGHT: 159.4 LBS | HEART RATE: 87 BPM | BODY MASS INDEX: 27.36 KG/M2 | SYSTOLIC BLOOD PRESSURE: 147 MMHG

## 2022-05-10 DIAGNOSIS — F60.3 BORDERLINE PERSONALITY DISORDER (HCC): ICD-10-CM

## 2022-05-10 DIAGNOSIS — F43.10 PTSD (POST-TRAUMATIC STRESS DISORDER): Primary | ICD-10-CM

## 2022-05-10 DIAGNOSIS — F40.10 SOCIAL ANXIETY DISORDER: ICD-10-CM

## 2022-05-10 DIAGNOSIS — F33.2 SEVERE EPISODE OF RECURRENT MAJOR DEPRESSIVE DISORDER, WITHOUT PSYCHOTIC FEATURES (HCC): ICD-10-CM

## 2022-05-10 PROCEDURE — 99214 OFFICE O/P EST MOD 30 MIN: CPT | Performed by: STUDENT IN AN ORGANIZED HEALTH CARE EDUCATION/TRAINING PROGRAM

## 2022-05-10 RX ORDER — DULOXETIN HYDROCHLORIDE 30 MG/1
30 CAPSULE, DELAYED RELEASE ORAL DAILY
Qty: 30 CAPSULE | Refills: 2 | Status: SHIPPED | OUTPATIENT
Start: 2022-05-10 | End: 2022-08-05 | Stop reason: SDUPTHER

## 2022-05-10 RX ORDER — PRAZOSIN HYDROCHLORIDE 2 MG/1
2 CAPSULE ORAL NIGHTLY
Qty: 30 CAPSULE | Refills: 3 | Status: SHIPPED | OUTPATIENT
Start: 2022-05-10 | End: 2022-08-05 | Stop reason: SDUPTHER

## 2022-05-10 ASSESSMENT — PATIENT HEALTH QUESTIONNAIRE - PHQ9
4. FEELING TIRED OR HAVING LITTLE ENERGY: 1
3. TROUBLE FALLING OR STAYING ASLEEP: 2
6. FEELING BAD ABOUT YOURSELF - OR THAT YOU ARE A FAILURE OR HAVE LET YOURSELF OR YOUR FAMILY DOWN: 2
5. POOR APPETITE OR OVEREATING: 1
SUM OF ALL RESPONSES TO PHQ QUESTIONS 1-9: 13
SUM OF ALL RESPONSES TO PHQ QUESTIONS 1-9: 13
10. IF YOU CHECKED OFF ANY PROBLEMS, HOW DIFFICULT HAVE THESE PROBLEMS MADE IT FOR YOU TO DO YOUR WORK, TAKE CARE OF THINGS AT HOME, OR GET ALONG WITH OTHER PEOPLE: 2
8. MOVING OR SPEAKING SO SLOWLY THAT OTHER PEOPLE COULD HAVE NOTICED. OR THE OPPOSITE, BEING SO FIGETY OR RESTLESS THAT YOU HAVE BEEN MOVING AROUND A LOT MORE THAN USUAL: 2
SUM OF ALL RESPONSES TO PHQ QUESTIONS 1-9: 13
SUM OF ALL RESPONSES TO PHQ QUESTIONS 1-9: 12
SUM OF ALL RESPONSES TO PHQ9 QUESTIONS 1 & 2: 2
1. LITTLE INTEREST OR PLEASURE IN DOING THINGS: 1
7. TROUBLE CONCENTRATING ON THINGS, SUCH AS READING THE NEWSPAPER OR WATCHING TELEVISION: 2
9. THOUGHTS THAT YOU WOULD BE BETTER OFF DEAD, OR OF HURTING YOURSELF: 1
2. FEELING DOWN, DEPRESSED OR HOPELESS: 1

## 2022-05-10 ASSESSMENT — ANXIETY QUESTIONNAIRES
6. BECOMING EASILY ANNOYED OR IRRITABLE: 2
1. FEELING NERVOUS, ANXIOUS, OR ON EDGE: 1
5. BEING SO RESTLESS THAT IT IS HARD TO SIT STILL: 2
7. FEELING AFRAID AS IF SOMETHING AWFUL MIGHT HAPPEN: 2
IF YOU CHECKED OFF ANY PROBLEMS ON THIS QUESTIONNAIRE, HOW DIFFICULT HAVE THESE PROBLEMS MADE IT FOR YOU TO DO YOUR WORK, TAKE CARE OF THINGS AT HOME, OR GET ALONG WITH OTHER PEOPLE: VERY DIFFICULT
4. TROUBLE RELAXING: 2
3. WORRYING TOO MUCH ABOUT DIFFERENT THINGS: 2
GAD7 TOTAL SCORE: 12
2. NOT BEING ABLE TO STOP OR CONTROL WORRYING: 1

## 2022-05-10 ASSESSMENT — ENCOUNTER SYMPTOMS
ALLERGIC/IMMUNOLOGIC NEGATIVE: 1
GASTROINTESTINAL NEGATIVE: 1
RESPIRATORY NEGATIVE: 1
EYES NEGATIVE: 1
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
## 2022-08-05 ENCOUNTER — OFFICE VISIT (OUTPATIENT)
Dept: PSYCHIATRY | Age: 32
End: 2022-08-05
Payer: COMMERCIAL

## 2022-08-05 VITALS
WEIGHT: 161.4 LBS | HEART RATE: 91 BPM | DIASTOLIC BLOOD PRESSURE: 87 MMHG | SYSTOLIC BLOOD PRESSURE: 133 MMHG | BODY MASS INDEX: 27.7 KG/M2

## 2022-08-05 DIAGNOSIS — F60.3 BORDERLINE PERSONALITY DISORDER (HCC): ICD-10-CM

## 2022-08-05 DIAGNOSIS — F43.10 PTSD (POST-TRAUMATIC STRESS DISORDER): Primary | ICD-10-CM

## 2022-08-05 DIAGNOSIS — F41.1 GENERALIZED ANXIETY DISORDER: ICD-10-CM

## 2022-08-05 DIAGNOSIS — F40.10 SOCIAL ANXIETY DISORDER: ICD-10-CM

## 2022-08-05 DIAGNOSIS — F33.2 SEVERE EPISODE OF RECURRENT MAJOR DEPRESSIVE DISORDER, WITHOUT PSYCHOTIC FEATURES (HCC): ICD-10-CM

## 2022-08-05 PROCEDURE — 90833 PSYTX W PT W E/M 30 MIN: CPT | Performed by: STUDENT IN AN ORGANIZED HEALTH CARE EDUCATION/TRAINING PROGRAM

## 2022-08-05 PROCEDURE — 99214 OFFICE O/P EST MOD 30 MIN: CPT | Performed by: STUDENT IN AN ORGANIZED HEALTH CARE EDUCATION/TRAINING PROGRAM

## 2022-08-05 RX ORDER — DULOXETIN HYDROCHLORIDE 60 MG/1
CAPSULE, DELAYED RELEASE ORAL
Qty: 90 CAPSULE | Refills: 1 | Status: SHIPPED | OUTPATIENT
Start: 2022-08-05

## 2022-08-05 RX ORDER — PRAZOSIN HYDROCHLORIDE 5 MG/1
5 CAPSULE ORAL NIGHTLY
Qty: 90 CAPSULE | Refills: 1 | Status: SHIPPED | OUTPATIENT
Start: 2022-08-05

## 2022-08-05 RX ORDER — DULOXETIN HYDROCHLORIDE 30 MG/1
30 CAPSULE, DELAYED RELEASE ORAL DAILY
Qty: 90 CAPSULE | Refills: 1 | Status: SHIPPED | OUTPATIENT
Start: 2022-08-05

## 2022-08-05 RX ORDER — PRAZOSIN HYDROCHLORIDE 2 MG/1
2 CAPSULE ORAL NIGHTLY
Qty: 30 CAPSULE | Refills: 3 | Status: SHIPPED | OUTPATIENT
Start: 2022-08-05 | End: 2022-09-09 | Stop reason: SDUPTHER

## 2022-08-05 ASSESSMENT — ANXIETY QUESTIONNAIRES
4. TROUBLE RELAXING: 3
3. WORRYING TOO MUCH ABOUT DIFFERENT THINGS: 3
6. BECOMING EASILY ANNOYED OR IRRITABLE: 2
GAD7 TOTAL SCORE: 15
1. FEELING NERVOUS, ANXIOUS, OR ON EDGE: 2
7. FEELING AFRAID AS IF SOMETHING AWFUL MIGHT HAPPEN: 1
IF YOU CHECKED OFF ANY PROBLEMS ON THIS QUESTIONNAIRE, HOW DIFFICULT HAVE THESE PROBLEMS MADE IT FOR YOU TO DO YOUR WORK, TAKE CARE OF THINGS AT HOME, OR GET ALONG WITH OTHER PEOPLE: SOMEWHAT DIFFICULT
2. NOT BEING ABLE TO STOP OR CONTROL WORRYING: 2
5. BEING SO RESTLESS THAT IT IS HARD TO SIT STILL: 2

## 2022-08-05 ASSESSMENT — PATIENT HEALTH QUESTIONNAIRE - PHQ9
SUM OF ALL RESPONSES TO PHQ QUESTIONS 1-9: 14
SUM OF ALL RESPONSES TO PHQ9 QUESTIONS 1 & 2: 3
8. MOVING OR SPEAKING SO SLOWLY THAT OTHER PEOPLE COULD HAVE NOTICED. OR THE OPPOSITE, BEING SO FIGETY OR RESTLESS THAT YOU HAVE BEEN MOVING AROUND A LOT MORE THAN USUAL: 2
2. FEELING DOWN, DEPRESSED OR HOPELESS: 1
10. IF YOU CHECKED OFF ANY PROBLEMS, HOW DIFFICULT HAVE THESE PROBLEMS MADE IT FOR YOU TO DO YOUR WORK, TAKE CARE OF THINGS AT HOME, OR GET ALONG WITH OTHER PEOPLE: 2
SUM OF ALL RESPONSES TO PHQ QUESTIONS 1-9: 14
3. TROUBLE FALLING OR STAYING ASLEEP: 2
6. FEELING BAD ABOUT YOURSELF - OR THAT YOU ARE A FAILURE OR HAVE LET YOURSELF OR YOUR FAMILY DOWN: 1
9. THOUGHTS THAT YOU WOULD BE BETTER OFF DEAD, OR OF HURTING YOURSELF: 0
7. TROUBLE CONCENTRATING ON THINGS, SUCH AS READING THE NEWSPAPER OR WATCHING TELEVISION: 2
4. FEELING TIRED OR HAVING LITTLE ENERGY: 2
1. LITTLE INTEREST OR PLEASURE IN DOING THINGS: 2
5. POOR APPETITE OR OVEREATING: 2
SUM OF ALL RESPONSES TO PHQ QUESTIONS 1-9: 14
SUM OF ALL RESPONSES TO PHQ QUESTIONS 1-9: 14

## 2022-09-04 NOTE — PROGRESS NOTES
PSYCHIATRY PROGRESS NOTE    Yeimi Spencer  1990  09/09/22  Face to Face time: 45 minutes, of which 20 minutes were spent in supportive psychotherapy  PCP: Leobardo Emanuel DO    CC:   Chief Complaint   Patient presents with    Follow-up    Stress     Patient is a 45-year-old female without significant past medical history who presents to the outpatient psychiatry clinic today for evaluation and management of depression and anxiety. A:  Patient's presentation today is indicative of continued stability of her underlying conditions, though still some ongoing distress. Much of this appears to be cognitive in nature, as opposed to biochemical.  We will attempt to provide the patient with ongoing support for such. Diagnosis:  PTSD, chronic  MDD R, severe without psychotic features (versus possible dysthymic disorder)  Generalized anxiety disorder  Social anxiety disorder  Borderline personality disorder  Opioid use disorder, severe, in sustained remission  Polysubstance use, in sustained remission (cocaine use, hallucinogen use, cannabis use, amphetamine use)    P:   We will plan to continue the patient on the current medication regimen of prazosin 7 mg nightly as well as duloxetine 90 mg daily. Medication Monitoring:    - PDMP reviewed: No interval change    Follow-up: 4 weeks    Safety: Pt was counseled on the potential for increased suicidal ideations and advised on potential options for dealing with these including hotlines, calling the office, or going to the nearest emergency room. __________________________________________________________________________    S:   Patient identified that she has been under a fair amount of stress lately and feeling that \"I get triggered really often\". Patient identified that these are PTSD triggers that cause her to experience some degree of distress.   Appears that many of them end up reflecting times when she was judged in her life, as the patient will frequently recall back to when she was actively using and feeling guilty for doing so. The patient noted that when she does get triggered, she gets some anxious spirals that then potentiate nightmares, now happening about 3-4x/wk. Interestingly, these triggers appear to be modifiable, in that the patient is able to think back on them and potentially contemplate an alternative response. CBT was used today to try and help the patient to identify evidence that she'd not considered and develop a synthesizing thought that included both past and present evidence. One of the core ideas identified by the patient was \"I'm the problem\", with the patient noting that this thought went back all the way into childhood and was only reinforced by her substance use. The patient denied any SI/HI/AVH. ROS:  Review of Systems   Constitutional: Negative. HENT: Negative. Eyes: Negative. Respiratory: Negative. Cardiovascular: Negative. Gastrointestinal: Negative. Endocrine: Negative. Genitourinary: Negative. Musculoskeletal: Negative. Skin: Negative. Allergic/Immunologic: Negative. Neurological: Negative. Hematological: Negative. Psychiatric/Behavioral:  Positive for dysphoric mood and sleep disturbance. The patient is nervous/anxious. Brief Medical Hx:   Patient Active Problem List   Diagnosis    Opioid use disorder, severe, in sustained remission (HCC)    Hepatitis C    Cigarette nicotine dependence without complication    Severe episode of recurrent major depressive disorder, without psychotic features ([address]    PTSD (post-traumatic stress disorder)    Generalized anxiety disorder    Social anxiety disorder    Borderline personality disorder ([address]    Polysubstance abuse ([address]    Shortness of breath        Brief Psych Hx:  Hosp: 2 prior hospitalizations in her teenage years  Diagnoses: Depression  Med trials: sertraline ([facility])  Outpt: Previously done therapy but not currently. Previously had outpatient psychiatry help but not recently  NSSI: History of cutting behaviors during her teenage years. Patient has not engaged in this in greater than 10 years  Suicide Attempts: 4-5 suicide attempts. Last attempt was at age 17. All attempts were done using overdoses of heroin    O:  Wt Readings from Last 3 Encounters:   09/09/22 162 lb (73.5 kg)   08/05/22 161 lb 6.4 oz (73.2 kg)   05/10/22 159 lb 6.4 oz (72.3 kg)       Vitals:    09/09/22 1309   BP: (!) 138/90   Pulse: 84   Weight: 162 lb (73.5 kg)       Mental Status Exam:   Appearance:    Appropriately dressed  Motor: No abnormal movements, tics or mannerisms.   Eye Contact: Good  Speech:    Appropriate rate and rhythm  Language:   Appropriate diction  Mood/Affect:  \" Stressed but it is the same old same old\"/full range of affect seem  Thought Process:   Linear, logical with some mild anxious ruminations noted  Thought Content:    Anxious content as well as depressive content present, no SI/HI  Hallucinations:   Denied, not seem to be responding to internal stimuli  Associations:   Intact  Attention/Concentration:   Intact  Orientation:    Alert and oriented x4  Memory:   Intact  Fund of Knowledge:    Appropriate for age and education  Insight/Judgement:   Intact/intact      ARTEMIO-7 SCREENING 9/9/2022 8/5/2022   Feeling nervous, anxious, or on edge More than half the days More than half the days   Not being able to stop or control worrying Several days More than half the days   Worrying too much about different things Nearly every day Nearly every day   Trouble relaxing More than half the days Nearly every day   Being so restless that it is hard to sit still More than half the days More than half the days   Becoming easily annoyed or irritable More than half the days More than half the days   Feeling afraid as if something awful might happen Several days Several days   ARTEMIO-7 Total Score 13 15   How difficult have these problems made it for you to do your work, take care of things at home, or get along with other people? Very difficult Somewhat difficult   Feeling nervous, anxious, or on edge - -   Not able to stop or control worrying - -   Worrying too much about different things - -   Trouble relaxing - -   Being so restless that it's hard to sit still - -   Becoming easily annoyed or irritable - -   Feeling afraid as if something awful might happen - -   ARTEMIO-7 Total Score - -     PHQ-9 Questionaire 2022   Little interest or pleasure in doing things 1 2   Feeling down, depressed, or hopeless 1 1   Trouble falling or staying asleep, or sleeping too much 2 2   Feeling tired or having little energy 2 2   Poor appetite or overeating 2 2   Feeling bad about yourself - or that you are a failure or have let yourself or your family down 2 1   Trouble concentrating on things, such as reading the newspaper or watching television 2 2   Moving or speaking so slowly that other people could have noticed. Or the opposite - being so fidgety or restless that you have been moving around a lot more than usual 1 2   Thoughts that you would be better off dead, or of hurting yourself in some way 2 0   PHQ-9 Total Score 15 14   If you checked off any problems, how difficult have these problems made it for you to do your work, take care of things at home, or get along with other people?  2 2      Labs:     Hospital Outpatient Visit on 2022   Component Date Value Ref Range Status    FEV1 %Pred-Pre 2022 102  % Final    FEV1 %Pred-Post 2022 107  % Final    FEV1/FVC-Pre 2022 88  % Final    FEV1/FVC-Post 2022 97  % Final    DLCO %Pred 2022 87  % Final    TLC %Pred 2022 102  % Final       EK/10/2021 QTc 426        Bon Natarajan MD  Psychiatrist

## 2022-09-09 ENCOUNTER — OFFICE VISIT (OUTPATIENT)
Dept: PSYCHIATRY | Age: 32
End: 2022-09-09
Payer: COMMERCIAL

## 2022-09-09 VITALS
WEIGHT: 162 LBS | BODY MASS INDEX: 27.81 KG/M2 | HEART RATE: 84 BPM | SYSTOLIC BLOOD PRESSURE: 138 MMHG | DIASTOLIC BLOOD PRESSURE: 90 MMHG

## 2022-09-09 DIAGNOSIS — F43.10 PTSD (POST-TRAUMATIC STRESS DISORDER): ICD-10-CM

## 2022-09-09 DIAGNOSIS — F60.3 BORDERLINE PERSONALITY DISORDER (HCC): ICD-10-CM

## 2022-09-09 DIAGNOSIS — F40.10 SOCIAL ANXIETY DISORDER: ICD-10-CM

## 2022-09-09 DIAGNOSIS — F33.2 SEVERE EPISODE OF RECURRENT MAJOR DEPRESSIVE DISORDER, WITHOUT PSYCHOTIC FEATURES (HCC): Primary | ICD-10-CM

## 2022-09-09 DIAGNOSIS — F41.1 GENERALIZED ANXIETY DISORDER: ICD-10-CM

## 2022-09-09 PROCEDURE — 99214 OFFICE O/P EST MOD 30 MIN: CPT | Performed by: STUDENT IN AN ORGANIZED HEALTH CARE EDUCATION/TRAINING PROGRAM

## 2022-09-09 PROCEDURE — 90833 PSYTX W PT W E/M 30 MIN: CPT | Performed by: STUDENT IN AN ORGANIZED HEALTH CARE EDUCATION/TRAINING PROGRAM

## 2022-09-09 RX ORDER — PRAZOSIN HYDROCHLORIDE 2 MG/1
2 CAPSULE ORAL NIGHTLY
Qty: 30 CAPSULE | Refills: 3 | Status: SHIPPED | OUTPATIENT
Start: 2022-09-09

## 2022-09-09 ASSESSMENT — PATIENT HEALTH QUESTIONNAIRE - PHQ9
3. TROUBLE FALLING OR STAYING ASLEEP: 2
SUM OF ALL RESPONSES TO PHQ QUESTIONS 1-9: 15
SUM OF ALL RESPONSES TO PHQ QUESTIONS 1-9: 15
SUM OF ALL RESPONSES TO PHQ QUESTIONS 1-9: 13
SUM OF ALL RESPONSES TO PHQ9 QUESTIONS 1 & 2: 2
8. MOVING OR SPEAKING SO SLOWLY THAT OTHER PEOPLE COULD HAVE NOTICED. OR THE OPPOSITE, BEING SO FIGETY OR RESTLESS THAT YOU HAVE BEEN MOVING AROUND A LOT MORE THAN USUAL: 1
4. FEELING TIRED OR HAVING LITTLE ENERGY: 2
5. POOR APPETITE OR OVEREATING: 2
7. TROUBLE CONCENTRATING ON THINGS, SUCH AS READING THE NEWSPAPER OR WATCHING TELEVISION: 2
10. IF YOU CHECKED OFF ANY PROBLEMS, HOW DIFFICULT HAVE THESE PROBLEMS MADE IT FOR YOU TO DO YOUR WORK, TAKE CARE OF THINGS AT HOME, OR GET ALONG WITH OTHER PEOPLE: 2
1. LITTLE INTEREST OR PLEASURE IN DOING THINGS: 1
6. FEELING BAD ABOUT YOURSELF - OR THAT YOU ARE A FAILURE OR HAVE LET YOURSELF OR YOUR FAMILY DOWN: 2
9. THOUGHTS THAT YOU WOULD BE BETTER OFF DEAD, OR OF HURTING YOURSELF: 2
2. FEELING DOWN, DEPRESSED OR HOPELESS: 1
SUM OF ALL RESPONSES TO PHQ QUESTIONS 1-9: 15

## 2022-09-09 ASSESSMENT — ENCOUNTER SYMPTOMS
EYES NEGATIVE: 1
ALLERGIC/IMMUNOLOGIC NEGATIVE: 1
RESPIRATORY NEGATIVE: 1
GASTROINTESTINAL NEGATIVE: 1

## 2022-09-09 ASSESSMENT — ANXIETY QUESTIONNAIRES
1. FEELING NERVOUS, ANXIOUS, OR ON EDGE: 2
IF YOU CHECKED OFF ANY PROBLEMS ON THIS QUESTIONNAIRE, HOW DIFFICULT HAVE THESE PROBLEMS MADE IT FOR YOU TO DO YOUR WORK, TAKE CARE OF THINGS AT HOME, OR GET ALONG WITH OTHER PEOPLE: VERY DIFFICULT
7. FEELING AFRAID AS IF SOMETHING AWFUL MIGHT HAPPEN: 1
2. NOT BEING ABLE TO STOP OR CONTROL WORRYING: 1
3. WORRYING TOO MUCH ABOUT DIFFERENT THINGS: 3
GAD7 TOTAL SCORE: 13
6. BECOMING EASILY ANNOYED OR IRRITABLE: 2
4. TROUBLE RELAXING: 2
5. BEING SO RESTLESS THAT IT IS HARD TO SIT STILL: 2

## 2022-09-09 ASSESSMENT — COLUMBIA-SUICIDE SEVERITY RATING SCALE - C-SSRS
6. HAVE YOU EVER DONE ANYTHING, STARTED TO DO ANYTHING, OR PREPARED TO DO ANYTHING TO END YOUR LIFE?: NO
2. HAVE YOU ACTUALLY HAD ANY THOUGHTS OF KILLING YOURSELF?: NO
1. WITHIN THE PAST MONTH, HAVE YOU WISHED YOU WERE DEAD OR WISHED YOU COULD GO TO SLEEP AND NOT WAKE UP?: NO

## 2022-10-05 ENCOUNTER — OFFICE VISIT (OUTPATIENT)
Dept: PRIMARY CARE CLINIC | Age: 32
End: 2022-10-05
Payer: COMMERCIAL

## 2022-10-05 VITALS
SYSTOLIC BLOOD PRESSURE: 121 MMHG | HEART RATE: 69 BPM | WEIGHT: 163.2 LBS | BODY MASS INDEX: 28.01 KG/M2 | DIASTOLIC BLOOD PRESSURE: 75 MMHG | TEMPERATURE: 97.2 F

## 2022-10-05 DIAGNOSIS — J02.0 STREP PHARYNGITIS: Primary | ICD-10-CM

## 2022-10-05 LAB — S PYO AG THROAT QL: POSITIVE

## 2022-10-05 PROCEDURE — 87880 STREP A ASSAY W/OPTIC: CPT | Performed by: STUDENT IN AN ORGANIZED HEALTH CARE EDUCATION/TRAINING PROGRAM

## 2022-10-05 PROCEDURE — 87804 INFLUENZA ASSAY W/OPTIC: CPT | Performed by: STUDENT IN AN ORGANIZED HEALTH CARE EDUCATION/TRAINING PROGRAM

## 2022-10-05 PROCEDURE — 99213 OFFICE O/P EST LOW 20 MIN: CPT | Performed by: STUDENT IN AN ORGANIZED HEALTH CARE EDUCATION/TRAINING PROGRAM

## 2022-10-05 RX ORDER — AMOXICILLIN 500 MG/1
500 CAPSULE ORAL 2 TIMES DAILY
Qty: 20 CAPSULE | Refills: 0 | Status: SHIPPED | OUTPATIENT
Start: 2022-10-05 | End: 2022-10-15

## 2022-10-05 RX ORDER — LIDOCAINE HYDROCHLORIDE 20 MG/ML
15 SOLUTION OROPHARYNGEAL PRN
Qty: 100 ML | Refills: 0 | Status: SHIPPED | OUTPATIENT
Start: 2022-10-05

## 2022-10-05 NOTE — LETTER
[facility]  [address] [place] [address] [address] [address]  Phone: [phone]  Fax: [address], [place]        October 5, 2022     Patient: Jennifer Martinez   YOB: 1990   Date of Visit: 10/5/2022       To Whom It May Concern: It is my medical opinion that Tony Byers may return to work this Friday 10/07/2022    If you have any questions or concerns, please don't hesitate to call.     Sincerely,        Stella Melissa DO

## 2022-10-05 NOTE — PROGRESS NOTES
Subjective:       Hayley Farias is a 33 y.o. female who presents for evaluation of sore throat. Associated symptoms include  fever, chills, sore throat, enlarged tonsils and white spots on tonsils . Onset of symptoms was 2 days ago, gradually worsening since that time. She is drinking plenty of fluids. She has had recent close exposure to someone with proven streptococcal pharyngitis. Patient's medications, allergies, past medical, surgical, social and family histories were reviewed and updated as appropriate. Review of Systems  Pertinent items are noted in HPI. Objective:      General appearance: alert, appears stated age, and cooperative  Head: Normocephalic, without obvious abnormality, atraumatic  Ears: normal TM's and external ear canals both ears  Nose: Nares normal. Septum midline. Mucosa normal. No drainage or sinus tenderness. Throat: abnormal findings: exudates present, marked oropharyngeal erythema, and tonsillar hypertrophy 3+  Lungs: clear to auscultation bilaterally  Heart: regular rate and rhythm, S1, S2 normal, no murmur, click, rub or gallop  Laboratory  Strep test done  Results:positive      Assessment:      Acute Pharyngitis, likely Strep throat. Plan:      Pt placed on antibiotics. Use of OTC analgesics recommended as well as salt water gargles. Pt advised of the risk of peritonsillar abscess formation. Pt advised that he will be infectious for 24 hours after starting antibiotics. Follow up as needed.

## 2022-10-12 NOTE — PROGRESS NOTES
PSYCHIATRY PROGRESS NOTE    Rashid Low  1990  10/14/22  Face to Face time: 45 minutes, of which 20 minutes were spent in supportive psychotherapy  PCP: Gene Champagne DO    CC:   Chief Complaint   Patient presents with    Follow-up    Depression     Patient is a 76-year-old female without significant past medical history who presents to the outpatient psychiatry clinic today for evaluation and management of depression and anxiety. A:  Patient's presentation today is indicative of improvement in her depression and PTSD symptoms with her current medication regimen, though still difficulties with anxiety exist.  In addition, the patient has significant symptoms concerning for bruxism that may be secondary to the duloxetine. We will attempt to address this through medication management. Diagnosis:  PTSD, chronic  MDD R, severe without psychotic features (versus possible dysthymic disorder)  Generalized anxiety disorder  Social anxiety disorder  Borderline personality disorder  Opioid use disorder, severe, in sustained remission  Polysubstance use, in sustained remission (cocaine use, hallucinogen use, cannabis use, amphetamine use)    P:   We will plan to maintain the patient on her current medications of prazosin 7 mg nightly and duloxetine 90 mg daily  We will plan to add buspirone 5 mg twice daily for treatment of generalized anxiety as well as potentially SSRI/SNRI-induced bruxism. Patient was cautioned regarding adverse effects this medication including nausea, headache, diarrhea, confusion, somnolence, and insomnia.     Medication Monitoring:    - PDMP reviewed: No interval change     Follow-up: 4 weeks    Safety: Pt was counseled on the potential for increased suicidal ideations and advised on potential options for dealing with these including hotlines, calling the office, or going to the nearest emergency room.      __________________________________________________________________________    S:   Patient indicated that she just recently gotten over a strep infection from last week and was trying to get things back on track for her home. Patient identified that she was feeling overall pretty good as far as her depression was concerned, feels that her medications have been helpful for her. She also identified that she was having some continued anxiety concerns as well as some concerns over her jaw clenching that is worse when she wakes up in the morning despite having a mouthguard in place. One of the biggest things that the patient identified had happened over the course the last month was that she actually stood up to her mother and defended herself. Patient's mother again brought up the fact that the patient got pregnant at age 14 and that she was forced to help raise the child. The patient spoke back up and admonished her mother for bringing up the past when the patient has made a lot of gains in the present. As a result, patient noted that her mother ended up backing down and the patient walked away from the conversation feeling that she was \"content\". This action of maintaining her own boundaries was discussed at length especially in terms of the concept of self respect, which the patient had previously been feeling she was lacking. Patient denied any SI/HI/AVH on evaluation today. ROS:  Review of Systems   Constitutional: Negative. HENT:          Positive for bruxism   Eyes: Negative. Respiratory: Negative. Cardiovascular: Negative. Gastrointestinal: Negative. Endocrine: Negative. Genitourinary: Negative. Musculoskeletal: Negative. Skin: Negative. Allergic/Immunologic: Negative. Neurological: Negative. Hematological: Negative. Psychiatric/Behavioral:  Positive for dysphoric mood. The patient is nervous/anxious.        Brief Medical Hx:   Patient Active Problem List   Diagnosis    Opioid use disorder, severe, in sustained remission (HCC)    Hepatitis C    Cigarette nicotine dependence without complication    Severe episode of recurrent major depressive disorder, without psychotic features ([facility] [address]    PTSD (post-traumatic stress disorder)    Generalized anxiety disorder    Social anxiety disorder    Borderline personality disorder ([facility] [address]    Polysubstance abuse ([facility]    Shortness of breath        Brief Psych Hx:  Hosp: 2 prior hospitalizations in her teenage years  Diagnoses: Depression  Med trials: sertraline (GI SE)  Outpt: Previously done therapy but not currently. Previously had outpatient psychiatry help but not recently  NSSI: History of cutting behaviors during her teenage years. Patient has not engaged in this in greater than 10 years  Suicide Attempts: 4-5 suicide attempts. Last attempt was at age 17. All attempts were done using overdoses of heroin    O:  Wt Readings from Last 3 Encounters:   10/14/22 164 lb 9.6 oz (74.7 kg)   10/05/22 163 lb 3.2 oz (74 kg)   09/09/22 162 lb (73.5 kg)       Vitals:    10/14/22 1324   BP: (!) 136/97   Pulse: 91   Weight: 164 lb 9.6 oz (74.7 kg)       Mental Status Exam:   Appearance:    Appropriately dressed  Motor: No abnormal movements, tics or mannerisms.   Eye Contact: Good  Speech:    Appropriate rate and rhythm  Language:   Appropriate diction  Mood/Affect:  \" Pretty good\"/gabriel range of affect seem than previous  Thought Process:   Linear, logical  Thought Content:    Some mildly anxious content present but generally topic-appropriate, no SI/HI  Hallucinations:   Denied, not seem to be responding to internal stimuli  Associations:   Intact  Attention/Concentration:   Intact  Orientation:    Alert and oriented x4  Memory:   Intact  Fund of Knowledge:    Appropriate for age and education  Insight/Judgement:   Intact/intact      ARTEMIO-7 SCREENING 10/14/2022 9/9/2022   Feeling nervous, anxious, or on edge More than half the days More than half the days   Not being able to stop or control worrying More than half the days Several days   Worrying too much about different things More than half the days Nearly every day   Trouble relaxing More than half the days More than half the days   Being so restless that it is hard to sit still Nearly every day More than half the days   Becoming easily annoyed or irritable Several days More than half the days   Feeling afraid as if something awful might happen Several days Several days   ARTEMIO-7 Total Score 13 13   How difficult have these problems made it for you to do your work, take care of things at home, or get along with other people? Very difficult Very difficult   Feeling nervous, anxious, or on edge - -   Not able to stop or control worrying - -   Worrying too much about different things - -   Trouble relaxing - -   Being so restless that it's hard to sit still - -   Becoming easily annoyed or irritable - -   Feeling afraid as if something awful might happen - -   ARTEMIO-7 Total Score - -     PHQ-9 Questionaire 10/14/2022 9/9/2022   Little interest or pleasure in doing things 1 1   Feeling down, depressed, or hopeless 1 1   Trouble falling or staying asleep, or sleeping too much 2 2   Feeling tired or having little energy 2 2   Poor appetite or overeating 1 2   Feeling bad about yourself - or that you are a failure or have let yourself or your family down 2 2   Trouble concentrating on things, such as reading the newspaper or watching television 2 2   Moving or speaking so slowly that other people could have noticed. Or the opposite - being so fidgety or restless that you have been moving around a lot more than usual 2 1   Thoughts that you would be better off dead, or of hurting yourself in some way 1 2   PHQ-9 Total Score 14 15   If you checked off any problems, how difficult have these problems made it for you to do your work, take care of things at home, or get along with other people?  1 2 Labs:     Office Visit on 10/05/2022   Component Date Value Ref Range Status    Strep A Ag 10/05/2022 Positive (A)  None Detected Final       EK/10/2021 QTc 426        Setven James MD  Psychiatrist

## 2022-10-14 ENCOUNTER — OFFICE VISIT (OUTPATIENT)
Dept: PSYCHIATRY | Age: 32
End: 2022-10-14

## 2022-10-14 VITALS
SYSTOLIC BLOOD PRESSURE: 136 MMHG | DIASTOLIC BLOOD PRESSURE: 97 MMHG | BODY MASS INDEX: 28.25 KG/M2 | HEART RATE: 91 BPM | WEIGHT: 164.6 LBS

## 2022-10-14 DIAGNOSIS — F40.10 SOCIAL ANXIETY DISORDER: ICD-10-CM

## 2022-10-14 DIAGNOSIS — F33.2 SEVERE EPISODE OF RECURRENT MAJOR DEPRESSIVE DISORDER, WITHOUT PSYCHOTIC FEATURES (HCC): ICD-10-CM

## 2022-10-14 DIAGNOSIS — F60.3 BORDERLINE PERSONALITY DISORDER (HCC): ICD-10-CM

## 2022-10-14 DIAGNOSIS — F43.10 PTSD (POST-TRAUMATIC STRESS DISORDER): ICD-10-CM

## 2022-10-14 DIAGNOSIS — F41.1 GENERALIZED ANXIETY DISORDER: Primary | ICD-10-CM

## 2022-10-14 PROCEDURE — 90833 PSYTX W PT W E/M 30 MIN: CPT | Performed by: STUDENT IN AN ORGANIZED HEALTH CARE EDUCATION/TRAINING PROGRAM

## 2022-10-14 PROCEDURE — 99214 OFFICE O/P EST MOD 30 MIN: CPT | Performed by: STUDENT IN AN ORGANIZED HEALTH CARE EDUCATION/TRAINING PROGRAM

## 2022-10-14 RX ORDER — BUSPIRONE HYDROCHLORIDE 5 MG/1
5 TABLET ORAL 2 TIMES DAILY
Qty: 60 TABLET | Refills: 2 | Status: SHIPPED | OUTPATIENT
Start: 2022-10-14

## 2022-10-14 ASSESSMENT — ANXIETY QUESTIONNAIRES
6. BECOMING EASILY ANNOYED OR IRRITABLE: 1
7. FEELING AFRAID AS IF SOMETHING AWFUL MIGHT HAPPEN: 1
2. NOT BEING ABLE TO STOP OR CONTROL WORRYING: 2
4. TROUBLE RELAXING: 2
1. FEELING NERVOUS, ANXIOUS, OR ON EDGE: 2
3. WORRYING TOO MUCH ABOUT DIFFERENT THINGS: 2
5. BEING SO RESTLESS THAT IT IS HARD TO SIT STILL: 3
IF YOU CHECKED OFF ANY PROBLEMS ON THIS QUESTIONNAIRE, HOW DIFFICULT HAVE THESE PROBLEMS MADE IT FOR YOU TO DO YOUR WORK, TAKE CARE OF THINGS AT HOME, OR GET ALONG WITH OTHER PEOPLE: VERY DIFFICULT
GAD7 TOTAL SCORE: 13

## 2022-10-14 ASSESSMENT — PATIENT HEALTH QUESTIONNAIRE - PHQ9
5. POOR APPETITE OR OVEREATING: 1
8. MOVING OR SPEAKING SO SLOWLY THAT OTHER PEOPLE COULD HAVE NOTICED. OR THE OPPOSITE, BEING SO FIGETY OR RESTLESS THAT YOU HAVE BEEN MOVING AROUND A LOT MORE THAN USUAL: 2
4. FEELING TIRED OR HAVING LITTLE ENERGY: 2
SUM OF ALL RESPONSES TO PHQ QUESTIONS 1-9: 14
SUM OF ALL RESPONSES TO PHQ QUESTIONS 1-9: 14
9. THOUGHTS THAT YOU WOULD BE BETTER OFF DEAD, OR OF HURTING YOURSELF: 1
2. FEELING DOWN, DEPRESSED OR HOPELESS: 1
3. TROUBLE FALLING OR STAYING ASLEEP: 2
SUM OF ALL RESPONSES TO PHQ QUESTIONS 1-9: 13
6. FEELING BAD ABOUT YOURSELF - OR THAT YOU ARE A FAILURE OR HAVE LET YOURSELF OR YOUR FAMILY DOWN: 2
1. LITTLE INTEREST OR PLEASURE IN DOING THINGS: 1
10. IF YOU CHECKED OFF ANY PROBLEMS, HOW DIFFICULT HAVE THESE PROBLEMS MADE IT FOR YOU TO DO YOUR WORK, TAKE CARE OF THINGS AT HOME, OR GET ALONG WITH OTHER PEOPLE: 1
SUM OF ALL RESPONSES TO PHQ9 QUESTIONS 1 & 2: 2
7. TROUBLE CONCENTRATING ON THINGS, SUCH AS READING THE NEWSPAPER OR WATCHING TELEVISION: 2
SUM OF ALL RESPONSES TO PHQ QUESTIONS 1-9: 14

## 2022-10-14 ASSESSMENT — ENCOUNTER SYMPTOMS
GASTROINTESTINAL NEGATIVE: 1
EYES NEGATIVE: 1
RESPIRATORY NEGATIVE: 1
ALLERGIC/IMMUNOLOGIC NEGATIVE: 1

## 2022-11-15 ENCOUNTER — OFFICE VISIT (OUTPATIENT)
Dept: PSYCHIATRY | Age: 32
End: 2022-11-15
Payer: COMMERCIAL

## 2022-11-15 VITALS
DIASTOLIC BLOOD PRESSURE: 87 MMHG | SYSTOLIC BLOOD PRESSURE: 136 MMHG | HEART RATE: 105 BPM | BODY MASS INDEX: 28.15 KG/M2 | WEIGHT: 164 LBS

## 2022-11-15 DIAGNOSIS — F33.2 SEVERE EPISODE OF RECURRENT MAJOR DEPRESSIVE DISORDER, WITHOUT PSYCHOTIC FEATURES (HCC): ICD-10-CM

## 2022-11-15 DIAGNOSIS — F41.1 GENERALIZED ANXIETY DISORDER: ICD-10-CM

## 2022-11-15 DIAGNOSIS — F43.10 PTSD (POST-TRAUMATIC STRESS DISORDER): ICD-10-CM

## 2022-11-15 PROCEDURE — 99214 OFFICE O/P EST MOD 30 MIN: CPT | Performed by: STUDENT IN AN ORGANIZED HEALTH CARE EDUCATION/TRAINING PROGRAM

## 2022-11-15 RX ORDER — DULOXETIN HYDROCHLORIDE 30 MG/1
30 CAPSULE, DELAYED RELEASE ORAL DAILY
Qty: 90 CAPSULE | Refills: 1 | Status: SHIPPED | OUTPATIENT
Start: 2022-11-15

## 2022-11-15 RX ORDER — BUSPIRONE HYDROCHLORIDE 10 MG/1
10 TABLET ORAL 2 TIMES DAILY
Qty: 180 TABLET | Refills: 1 | Status: SHIPPED | OUTPATIENT
Start: 2022-11-15

## 2022-11-15 RX ORDER — DULOXETIN HYDROCHLORIDE 60 MG/1
CAPSULE, DELAYED RELEASE ORAL
Qty: 90 CAPSULE | Refills: 1 | Status: SHIPPED | OUTPATIENT
Start: 2022-11-15

## 2022-11-15 RX ORDER — PRAZOSIN HYDROCHLORIDE 2 MG/1
2 CAPSULE ORAL NIGHTLY
Qty: 90 CAPSULE | Refills: 1 | Status: SHIPPED | OUTPATIENT
Start: 2022-11-15

## 2022-11-15 RX ORDER — PRAZOSIN HYDROCHLORIDE 5 MG/1
5 CAPSULE ORAL NIGHTLY
Qty: 90 CAPSULE | Refills: 1 | Status: SHIPPED | OUTPATIENT
Start: 2022-11-15

## 2022-11-15 ASSESSMENT — ANXIETY QUESTIONNAIRES
5. BEING SO RESTLESS THAT IT IS HARD TO SIT STILL: 2
3. WORRYING TOO MUCH ABOUT DIFFERENT THINGS: 2
GAD7 TOTAL SCORE: 15
7. FEELING AFRAID AS IF SOMETHING AWFUL MIGHT HAPPEN: 2
6. BECOMING EASILY ANNOYED OR IRRITABLE: 2
2. NOT BEING ABLE TO STOP OR CONTROL WORRYING: 2
4. TROUBLE RELAXING: 2
1. FEELING NERVOUS, ANXIOUS, OR ON EDGE: 3
IF YOU CHECKED OFF ANY PROBLEMS ON THIS QUESTIONNAIRE, HOW DIFFICULT HAVE THESE PROBLEMS MADE IT FOR YOU TO DO YOUR WORK, TAKE CARE OF THINGS AT HOME, OR GET ALONG WITH OTHER PEOPLE: VERY DIFFICULT

## 2022-11-15 ASSESSMENT — PATIENT HEALTH QUESTIONNAIRE - PHQ9
8. MOVING OR SPEAKING SO SLOWLY THAT OTHER PEOPLE COULD HAVE NOTICED. OR THE OPPOSITE, BEING SO FIGETY OR RESTLESS THAT YOU HAVE BEEN MOVING AROUND A LOT MORE THAN USUAL: 2
SUM OF ALL RESPONSES TO PHQ QUESTIONS 1-9: 14
7. TROUBLE CONCENTRATING ON THINGS, SUCH AS READING THE NEWSPAPER OR WATCHING TELEVISION: 2
9. THOUGHTS THAT YOU WOULD BE BETTER OFF DEAD, OR OF HURTING YOURSELF: 1
SUM OF ALL RESPONSES TO PHQ9 QUESTIONS 1 & 2: 2
SUM OF ALL RESPONSES TO PHQ QUESTIONS 1-9: 15
6. FEELING BAD ABOUT YOURSELF - OR THAT YOU ARE A FAILURE OR HAVE LET YOURSELF OR YOUR FAMILY DOWN: 3
SUM OF ALL RESPONSES TO PHQ QUESTIONS 1-9: 15
5. POOR APPETITE OR OVEREATING: 1
4. FEELING TIRED OR HAVING LITTLE ENERGY: 2
1. LITTLE INTEREST OR PLEASURE IN DOING THINGS: 1
SUM OF ALL RESPONSES TO PHQ QUESTIONS 1-9: 15
3. TROUBLE FALLING OR STAYING ASLEEP: 2
10. IF YOU CHECKED OFF ANY PROBLEMS, HOW DIFFICULT HAVE THESE PROBLEMS MADE IT FOR YOU TO DO YOUR WORK, TAKE CARE OF THINGS AT HOME, OR GET ALONG WITH OTHER PEOPLE: 2
2. FEELING DOWN, DEPRESSED OR HOPELESS: 1

## 2022-11-15 ASSESSMENT — COLUMBIA-SUICIDE SEVERITY RATING SCALE - C-SSRS
6. HAVE YOU EVER DONE ANYTHING, STARTED TO DO ANYTHING, OR PREPARED TO DO ANYTHING TO END YOUR LIFE?: NO
1. WITHIN THE PAST MONTH, HAVE YOU WISHED YOU WERE DEAD OR WISHED YOU COULD GO TO SLEEP AND NOT WAKE UP?: NO
2. HAVE YOU ACTUALLY HAD ANY THOUGHTS OF KILLING YOURSELF?: NO

## 2022-11-15 NOTE — PROGRESS NOTES
PSYCHIATRY PROGRESS NOTE    Summer Alfred  1990  11/15/22  Face to Face time: 30 minutes  PCP: Alex Marcano DO    CC:   Chief Complaint   Patient presents with    Follow-up    Anxiety    Depression     Patient is a 76-year-old female without significant past medical history who presents to the outpatient psychiatry clinic today for evaluation and management of depression and anxiety. A:  Patient's presentation today is indicative of significant improvement in her overall affect and outlook. Patient continues to have better control over her depression  Residual anxiety concerns. We will continue to help her with additional medication changes. Diagnosis:  PTSD, chronic  MDD R, severe without psychotic features (versus possible dysthymic disorder)  Generalized anxiety disorder  Social anxiety disorder  Borderline personality disorder  Opioid use disorder, severe, in sustained remission  Polysubstance use, in sustained remission (cocaine use, hallucinogen use, cannabis use, amphetamine use)    P:   1. We will plan to continue the patient's current medications of prazosin 7 mg nightly and duloxetine 90 mg daily. 2.  We will plan to increase the patient's buspirone to 10 mg twice daily for better control of her anxiety. Patient was cautioned regarding adverse effects this medication has been described to her previously. Medication Monitoring:    - PDMP reviewed: No interval change     Follow-up: 3 months    Safety: Pt was counseled on the potential for increased suicidal ideations and advised on potential options for dealing with these including hotlines, calling the office, or going to the nearest emergency room. __________________________________________________________________________    S:   Patient indicated that she did feel that the BuSpar was helpful after having been added from last time. She noted that it was only to a degree, however she felt less on edge and she had previously. She is still having some difficulties with afternoon and evening times as far as her anxiety is concerned. She did end up putting in her 2 weeks as of the day prior to this evaluation, finding that it has been a mild relief since doing so. That being said, she does not have anything lined up as far as work is concerned after the 2 weeks is up, feels ambivalent about this at this time. Part of her does wish that she had more control over this, however she recognizes that the toxic environment of her current workplace is causing her ongoing stress. She stated that she is \"tired of bringing it home\". Overall, the patient feels that things are moving in the right direction. Patient denied any SI/HI/AVH and evaluation today. ROS:  Review of Systems   Constitutional: Negative. HENT: Negative. Eyes: Negative. Respiratory: Negative. Cardiovascular: Negative. Gastrointestinal: Negative. Endocrine: Negative. Genitourinary: Negative. Musculoskeletal: Negative. Skin: Negative. Allergic/Immunologic: Negative. Neurological: Negative. Hematological: Negative. Psychiatric/Behavioral:  Positive for dysphoric mood. The patient is nervous/anxious. Brief Medical Hx:   Patient Active Problem List   Diagnosis    Opioid use disorder, severe, in sustained remission (HCC)    Hepatitis C    Cigarette nicotine dependence without complication    Severe episode of recurrent major depressive disorder, without psychotic features ([address]    PTSD (post-traumatic stress disorder)    Generalized anxiety disorder    Social anxiety disorder    Borderline personality disorder ([address]    Polysubstance abuse ([address]    Shortness of breath        Brief Psych Hx:  Hosp: 2 prior hospitalizations in her teenage years  Diagnoses: Depression  Med trials: sertraline (GI SE)  Outpt: Previously done therapy but not currently.   Previously had outpatient psychiatry help but not recently  NSSI: History of cutting behaviors during her teenage years. Patient has not engaged in this in greater than 10 years  Suicide Attempts: 4-5 suicide attempts. Last attempt was at age 17. All attempts were done using overdoses of heroin    O:  Wt Readings from Last 3 Encounters:   11/15/22 164 lb (74.4 kg)   10/14/22 164 lb 9.6 oz (74.7 kg)   10/05/22 163 lb 3.2 oz (74 kg)       Vitals:    11/15/22 1331   BP: 136/87   Pulse: (!) 105   Weight: 164 lb (74.4 kg)       Mental Status Exam:   Appearance:    Appropriately dressed  Motor: No abnormal movements, tics or mannerisms. Eye Contact: Good  Speech:    Appropriate rate and rhythm  Language:   Appropriate diction  Mood/Affect:  \" Getting better\"/full range of affect seen  Thought Process:   Linear, logical  Thought Content:    Some mild anxious content present, no SI/HI  Hallucinations:   Denied, not seem to be responding to internal stimuli  Associations:   Intact  Attention/Concentration:   Intact  Orientation:    Alert and oriented x4  Memory:   Intact  Fund of Knowledge:    Appropriate for age and education  Insight/Judgement:   Intact/intact      ARTEMIO-7 SCREENING 11/15/2022 10/14/2022   Feeling nervous, anxious, or on edge Nearly every day More than half the days   Not being able to stop or control worrying More than half the days More than half the days   Worrying too much about different things More than half the days More than half the days   Trouble relaxing More than half the days More than half the days   Being so restless that it is hard to sit still More than half the days Nearly every day   Becoming easily annoyed or irritable More than half the days Several days   Feeling afraid as if something awful might happen More than half the days Several days   ARTEMIO-7 Total Score 15 13   How difficult have these problems made it for you to do your work, take care of things at home, or get along with other people?  Very difficult Very difficult   Feeling nervous, anxious, or on edge - - Not able to stop or control worrying - -   Worrying too much about different things - -   Trouble relaxing - -   Being so restless that it's hard to sit still - -   Becoming easily annoyed or irritable - -   Feeling afraid as if something awful might happen - -   ARTEMIO-7 Total Score - -     PHQ-9 Questionaire 11/15/2022 10/14/2022   Little interest or pleasure in doing things 1 1   Feeling down, depressed, or hopeless 1 1   Trouble falling or staying asleep, or sleeping too much 2 2   Feeling tired or having little energy 2 2   Poor appetite or overeating 1 1   Feeling bad about yourself - or that you are a failure or have let yourself or your family down 3 2   Trouble concentrating on things, such as reading the newspaper or watching television 2 2   Moving or speaking so slowly that other people could have noticed. Or the opposite - being so fidgety or restless that you have been moving around a lot more than usual 2 2   Thoughts that you would be better off dead, or of hurting yourself in some way 1 1   PHQ-9 Total Score 15 14   If you checked off any problems, how difficult have these problems made it for you to do your work, take care of things at home, or get along with other people?  2 1     Labs:     Office Visit on 10/05/2022   Component Date Value Ref Range Status    Strep A Ag 10/05/2022 Positive (A)  None Detected Final       EK/10/2021 QTc 426        Mady Mcneil MD  Psychiatrist

## 2022-12-18 ASSESSMENT — ENCOUNTER SYMPTOMS
RESPIRATORY NEGATIVE: 1
ALLERGIC/IMMUNOLOGIC NEGATIVE: 1
GASTROINTESTINAL NEGATIVE: 1
EYES NEGATIVE: 1

## 2023-02-10 DIAGNOSIS — F43.10 PTSD (POST-TRAUMATIC STRESS DISORDER): ICD-10-CM

## 2023-02-10 RX ORDER — DULOXETIN HYDROCHLORIDE 30 MG/1
CAPSULE, DELAYED RELEASE ORAL
Qty: 90 CAPSULE | Refills: 1 | Status: SHIPPED | OUTPATIENT
Start: 2023-02-10

## 2023-02-10 NOTE — TELEPHONE ENCOUNTER
Medication:   Requested Prescriptions     Pending Prescriptions Disp Refills    DULoxetine (CYMBALTA) 30 MG extended release capsule [Pharmacy Med Name: DULOXETINE HCL DR 30 MG CAP] 90 capsule 1     Sig: TAKE 1 CAPSULE BY MOUTH IN THE MORNING. TAKE IN ADDITION TO THE 60MG DOSE FOR A TOTAL OF 90MG. .        Last Filled:  11/15/22    Patient Phone Number: [phone] (home)     Last appt: 11/15/2022   Next appt: 2/17/2023    Last OARRS:   RX Monitoring 2/10/2021   Acute Pain Prescriptions Severe pain not adequately treated with lower dose.
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
## 2023-03-03 ENCOUNTER — OFFICE VISIT (OUTPATIENT)
Dept: PSYCHIATRY | Age: 33
End: 2023-03-03

## 2023-03-03 VITALS
BODY MASS INDEX: 26.95 KG/M2 | SYSTOLIC BLOOD PRESSURE: 171 MMHG | HEART RATE: 80 BPM | DIASTOLIC BLOOD PRESSURE: 104 MMHG | WEIGHT: 157 LBS

## 2023-03-03 DIAGNOSIS — F40.10 SOCIAL ANXIETY DISORDER: ICD-10-CM

## 2023-03-03 DIAGNOSIS — F60.3 BORDERLINE PERSONALITY DISORDER (HCC): ICD-10-CM

## 2023-03-03 DIAGNOSIS — F43.23 ADJUSTMENT REACTION WITH ANXIETY AND DEPRESSION: ICD-10-CM

## 2023-03-03 DIAGNOSIS — F33.2 SEVERE EPISODE OF RECURRENT MAJOR DEPRESSIVE DISORDER, WITHOUT PSYCHOTIC FEATURES (HCC): ICD-10-CM

## 2023-03-03 DIAGNOSIS — F41.1 GENERALIZED ANXIETY DISORDER: ICD-10-CM

## 2023-03-03 DIAGNOSIS — F43.10 PTSD (POST-TRAUMATIC STRESS DISORDER): Primary | ICD-10-CM

## 2023-03-03 PROCEDURE — 99214 OFFICE O/P EST MOD 30 MIN: CPT | Performed by: STUDENT IN AN ORGANIZED HEALTH CARE EDUCATION/TRAINING PROGRAM

## 2023-03-03 ASSESSMENT — ANXIETY QUESTIONNAIRES
3. WORRYING TOO MUCH ABOUT DIFFERENT THINGS: 3
6. BECOMING EASILY ANNOYED OR IRRITABLE: 2
2. NOT BEING ABLE TO STOP OR CONTROL WORRYING: 2
IF YOU CHECKED OFF ANY PROBLEMS ON THIS QUESTIONNAIRE, HOW DIFFICULT HAVE THESE PROBLEMS MADE IT FOR YOU TO DO YOUR WORK, TAKE CARE OF THINGS AT HOME, OR GET ALONG WITH OTHER PEOPLE: VERY DIFFICULT
7. FEELING AFRAID AS IF SOMETHING AWFUL MIGHT HAPPEN: 3
GAD7 TOTAL SCORE: 16
4. TROUBLE RELAXING: 2
5. BEING SO RESTLESS THAT IT IS HARD TO SIT STILL: 2
1. FEELING NERVOUS, ANXIOUS, OR ON EDGE: 2

## 2023-03-03 ASSESSMENT — ENCOUNTER SYMPTOMS
GASTROINTESTINAL NEGATIVE: 1
RESPIRATORY NEGATIVE: 1
ALLERGIC/IMMUNOLOGIC NEGATIVE: 1
EYES NEGATIVE: 1

## 2023-03-03 ASSESSMENT — PATIENT HEALTH QUESTIONNAIRE - PHQ9
9. THOUGHTS THAT YOU WOULD BE BETTER OFF DEAD, OR OF HURTING YOURSELF: 1
10. IF YOU CHECKED OFF ANY PROBLEMS, HOW DIFFICULT HAVE THESE PROBLEMS MADE IT FOR YOU TO DO YOUR WORK, TAKE CARE OF THINGS AT HOME, OR GET ALONG WITH OTHER PEOPLE: 2
SUM OF ALL RESPONSES TO PHQ9 QUESTIONS 1 & 2: 4
3. TROUBLE FALLING OR STAYING ASLEEP: 1
1. LITTLE INTEREST OR PLEASURE IN DOING THINGS: 2
SUM OF ALL RESPONSES TO PHQ QUESTIONS 1-9: 16
8. MOVING OR SPEAKING SO SLOWLY THAT OTHER PEOPLE COULD HAVE NOTICED. OR THE OPPOSITE, BEING SO FIGETY OR RESTLESS THAT YOU HAVE BEEN MOVING AROUND A LOT MORE THAN USUAL: 2
5. POOR APPETITE OR OVEREATING: 2
2. FEELING DOWN, DEPRESSED OR HOPELESS: 2
4. FEELING TIRED OR HAVING LITTLE ENERGY: 2
SUM OF ALL RESPONSES TO PHQ QUESTIONS 1-9: 15
SUM OF ALL RESPONSES TO PHQ QUESTIONS 1-9: 16
6. FEELING BAD ABOUT YOURSELF - OR THAT YOU ARE A FAILURE OR HAVE LET YOURSELF OR YOUR FAMILY DOWN: 2
SUM OF ALL RESPONSES TO PHQ QUESTIONS 1-9: 16
7. TROUBLE CONCENTRATING ON THINGS, SUCH AS READING THE NEWSPAPER OR WATCHING TELEVISION: 2

## 2023-03-08 PROBLEM — F19.10 POLYSUBSTANCE ABUSE (HCC): Status: RESOLVED | Noted: 2022-01-14 | Resolved: 2023-03-08

## 2023-03-09 ENCOUNTER — OFFICE VISIT (OUTPATIENT)
Dept: PRIMARY CARE CLINIC | Age: 33
End: 2023-03-09

## 2023-03-09 VITALS
SYSTOLIC BLOOD PRESSURE: 130 MMHG | HEIGHT: 64 IN | BODY MASS INDEX: 26.29 KG/M2 | DIASTOLIC BLOOD PRESSURE: 94 MMHG | HEART RATE: 80 BPM | WEIGHT: 154 LBS

## 2023-03-09 DIAGNOSIS — I10 ESSENTIAL HYPERTENSION: ICD-10-CM

## 2023-03-09 DIAGNOSIS — F33.2 SEVERE EPISODE OF RECURRENT MAJOR DEPRESSIVE DISORDER, WITHOUT PSYCHOTIC FEATURES (HCC): ICD-10-CM

## 2023-03-09 DIAGNOSIS — B18.2 CHRONIC HEPATITIS C WITHOUT HEPATIC COMA (HCC): ICD-10-CM

## 2023-03-09 DIAGNOSIS — Z00.00 ROUTINE GENERAL MEDICAL EXAMINATION AT A HEALTH CARE FACILITY: Primary | ICD-10-CM

## 2023-03-09 DIAGNOSIS — F17.210 CIGARETTE NICOTINE DEPENDENCE WITHOUT COMPLICATION: ICD-10-CM

## 2023-03-09 DIAGNOSIS — F11.21 OPIOID USE DISORDER, SEVERE, IN SUSTAINED REMISSION (HCC): ICD-10-CM

## 2023-03-09 LAB
A/G RATIO: 1.5 (ref 1.1–2.2)
ALBUMIN SERPL-MCNC: 4.3 G/DL (ref 3.4–5)
ALP BLD-CCNC: 65 U/L (ref 40–129)
ALT SERPL-CCNC: 93 U/L (ref 10–40)
ANION GAP SERPL CALCULATED.3IONS-SCNC: 12 MMOL/L (ref 3–16)
AST SERPL-CCNC: 109 U/L (ref 15–37)
BILIRUB SERPL-MCNC: 0.4 MG/DL (ref 0–1)
BUN BLDV-MCNC: 6 MG/DL (ref 7–20)
CALCIUM SERPL-MCNC: 9.3 MG/DL (ref 8.3–10.6)
CHLORIDE BLD-SCNC: 104 MMOL/L (ref 99–110)
CO2: 24 MMOL/L (ref 21–32)
CREAT SERPL-MCNC: 0.7 MG/DL (ref 0.6–1.1)
GFR SERPL CREATININE-BSD FRML MDRD: >60 ML/MIN/{1.73_M2}
GLUCOSE BLD-MCNC: 82 MG/DL (ref 70–99)
POTASSIUM SERPL-SCNC: 4.6 MMOL/L (ref 3.5–5.1)
SODIUM BLD-SCNC: 140 MMOL/L (ref 136–145)
TOTAL PROTEIN: 7.1 G/DL (ref 6.4–8.2)

## 2023-03-09 RX ORDER — AMLODIPINE BESYLATE 2.5 MG/1
2.5 TABLET ORAL DAILY
Qty: 30 TABLET | Refills: 5 | Status: SHIPPED | OUTPATIENT
Start: 2023-03-09

## 2023-03-09 ASSESSMENT — ENCOUNTER SYMPTOMS
SHORTNESS OF BREATH: 0
BLOOD IN STOOL: 0
BACK PAIN: 0
ABDOMINAL PAIN: 0
DIARRHEA: 0
CONSTIPATION: 0
STRIDOR: 0
CHEST TIGHTNESS: 0

## 2023-03-09 NOTE — PATIENT INSTRUCTIONS
Patient Education        Well Visit, Ages 25 to 72: Care Instructions  Well visits can help you stay healthy. Your doctor has checked your overall health and may have suggested ways to take good care of yourself. Your doctor also may have recommended tests. You can help prevent illness with healthy eating, good sleep, vaccinations, regular exercise, and other steps. Get the tests that you and your doctor decide on. Depending on your age and risks, examples might include screening for diabetes; hepatitis C; HIV; and cervical, breast, lung, and colon cancer. Screening helps find diseases before any symptoms appear. Eat healthy foods. Choose fruits, vegetables, whole grains, lean protein, and low-fat dairy foods. Limit saturated fat and reduce salt. Limit alcohol. Men should have no more than 2 drinks a day. Women should have no more than 1. For some people, no alcohol is the best choice. Exercise. Get at least 30 minutes of exercise on most days of the week. Walking can be a good choice. Reach and stay at your healthy weight. This will lower your risk for many health problems. Take care of your mental health. Try to stay connected with friends, family, and community, and find ways to manage stress. If you're feeling depressed or hopeless, talk to someone. A counselor can help. If you don't have a counselor, talk to your doctor. Talk to your doctor if you think you may have a problem with alcohol or drug use. This includes prescription medicines and illegal drugs. Avoid tobacco and nicotine: Don't smoke, vape, or chew. If you need help quitting, talk to your doctor. Practice safer sex. Getting tested, using condoms or dental dams, and limiting sex partners can help prevent STIs. Use birth control if it's important to you to prevent pregnancy. Talk with your doctor about your choices and what might be best for you. Prevent problems where you can.  Protect your skin from too much sun, wash your hands, brush your teeth twice a day, and wear a seat belt in the car. Where can you learn more? Go to http://www.moore.com/ and enter P072 to learn more about \"Well Visit, Ages 25 to 72: Care Instructions. \"  Current as of: March 9, 2022               Content Version: 13.5  © 1039-4812 Healthwise, Incorporated. Care instructions adapted under license by [facility] ([facility]). If you have questions about a medical condition or this instruction, always ask your healthcare professional. [address] any warranty or liability for your use of this information.
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
## 2023-03-11 LAB
HCV QNT BY NAAT IU/ML: ABNORMAL IU/ML
HCV QNT BY NAAT LOG IU/ML: 6.27 LOG IU/ML
INTERPRETATION: DETECTED

## 2023-03-14 LAB — HCV GENTYP SERPL NAA+PROBE: NORMAL

## 2023-04-03 DIAGNOSIS — I10 ESSENTIAL HYPERTENSION: ICD-10-CM

## 2023-04-03 RX ORDER — AMLODIPINE BESYLATE 2.5 MG/1
TABLET ORAL
Qty: 30 TABLET | Refills: 5 | OUTPATIENT
Start: 2023-04-03

## 2023-04-03 NOTE — TELEPHONE ENCOUNTER
Medication:   Requested Prescriptions     Pending Prescriptions Disp Refills    amLODIPine (NORVASC) 2.5 MG tablet [Pharmacy Med Name: AMLODIPINE BESYLATE 2.5 MG TAB] 30 tablet 5     Sig: TAKE 1 TABLET BY MOUTH EVERY DAY        Last Filled:03/09/23      Patient Phone Number: [phone] (home)     Last appt: 3/9/2023   Next appt: 4/6/2023    Last OARRS:   RX Monitoring 2/10/2021   Acute Pain Prescriptions Severe pain not adequately treated with lower dose.

## 2023-04-05 PROBLEM — I10 ESSENTIAL HYPERTENSION: Status: ACTIVE | Noted: 2023-04-05

## 2023-04-05 NOTE — PATIENT INSTRUCTIONS
dip.  Sprinkle sunflower seeds or chopped almonds over salads. Or try adding chopped walnuts or almonds to cooked vegetables. Try some vegetarian meals using beans and peas. Add garbanzo or kidney beans to salads. Make burritos and tacos with mashed melissa beans or black beans. Where can you learn more? Go to [email or web address] org and sign in to your [employer] account. Enter S399 in the Oversee box to learn more about \"DASH Diet: Care Instructions. \"     If you do not have an account, please click on the \"Sign Up Now\" link. Current as of: January 10, 2022               Content Version: 13.4  © 8677-1594 Healthwise, Incorporated. Care instructions adapted under license by [facility] ([facility]). If you have questions about a medical condition or this instruction, always ask your healthcare professional. [place] [address] any warranty or liability for your use of this information.

## 2023-04-05 NOTE — PROGRESS NOTES
supple. Right lower leg: No edema. Left lower leg: No edema. Skin:     General: Skin is warm and dry. Capillary Refill: Capillary refill takes less than 2 seconds. Neurological:      General: No focal deficit present. Mental Status: She is alert and oriented to person, place, and time. Mental status is at baseline. Psychiatric:         Mood and Affect: Mood normal.         Behavior: Behavior normal.         Thought Content: Thought content normal.         Judgment: Judgment normal.       ASSESSMENT/PLAN:  1. Essential hypertension: Blood pressure at goal today. Tolerating current regimen well without side effects. Refills given. Routine follow up in 6 months. BP Readings from Last 3 Encounters:   04/06/23 122/76   03/09/23 (!) 130/94   03/03/23 (!) 171/104     - amLODIPine (NORVASC) 2.5 MG tablet; Take 1 tablet by mouth daily  Dispense: 30 tablet; Refill: 5      Return in about 6 months (around 10/6/2023) for Blood Pressure. An electronic signature was used to authenticate this note.     --Reji Montejo DO on 4/6/2023 at 11:56 AM

## 2023-04-06 ENCOUNTER — OFFICE VISIT (OUTPATIENT)
Dept: PRIMARY CARE CLINIC | Age: 33
End: 2023-04-06

## 2023-04-06 VITALS
SYSTOLIC BLOOD PRESSURE: 122 MMHG | HEIGHT: 64 IN | BODY MASS INDEX: 26.6 KG/M2 | HEART RATE: 79 BPM | TEMPERATURE: 98.2 F | DIASTOLIC BLOOD PRESSURE: 76 MMHG | WEIGHT: 155.8 LBS

## 2023-04-06 DIAGNOSIS — I10 ESSENTIAL HYPERTENSION: Primary | ICD-10-CM

## 2023-04-06 PROCEDURE — 3078F DIAST BP <80 MM HG: CPT | Performed by: STUDENT IN AN ORGANIZED HEALTH CARE EDUCATION/TRAINING PROGRAM

## 2023-04-06 PROCEDURE — 3074F SYST BP LT 130 MM HG: CPT | Performed by: STUDENT IN AN ORGANIZED HEALTH CARE EDUCATION/TRAINING PROGRAM

## 2023-04-06 PROCEDURE — 99213 OFFICE O/P EST LOW 20 MIN: CPT | Performed by: STUDENT IN AN ORGANIZED HEALTH CARE EDUCATION/TRAINING PROGRAM

## 2023-04-06 RX ORDER — AMLODIPINE BESYLATE 2.5 MG/1
2.5 TABLET ORAL DAILY
Qty: 30 TABLET | Refills: 5 | Status: SHIPPED | OUTPATIENT
Start: 2023-04-06

## 2023-04-06 SDOH — ECONOMIC STABILITY: FOOD INSECURITY: WITHIN THE PAST 12 MONTHS, THE FOOD YOU BOUGHT JUST DIDN'T LAST AND YOU DIDN'T HAVE MONEY TO GET MORE.: NEVER TRUE

## 2023-04-06 SDOH — ECONOMIC STABILITY: FOOD INSECURITY: WITHIN THE PAST 12 MONTHS, YOU WORRIED THAT YOUR FOOD WOULD RUN OUT BEFORE YOU GOT MONEY TO BUY MORE.: NEVER TRUE

## 2023-04-06 SDOH — ECONOMIC STABILITY: INCOME INSECURITY: HOW HARD IS IT FOR YOU TO PAY FOR THE VERY BASICS LIKE FOOD, HOUSING, MEDICAL CARE, AND HEATING?: NOT HARD AT ALL

## 2023-04-06 SDOH — ECONOMIC STABILITY: HOUSING INSECURITY
IN THE LAST 12 MONTHS, WAS THERE A TIME WHEN YOU DID NOT HAVE A STEADY PLACE TO SLEEP OR SLEPT IN A SHELTER (INCLUDING NOW)?: NO

## 2023-04-06 ASSESSMENT — ENCOUNTER SYMPTOMS
SHORTNESS OF BREATH: 0
CHEST TIGHTNESS: 0
COUGH: 0

## 2023-05-10 DIAGNOSIS — F41.1 GENERALIZED ANXIETY DISORDER: ICD-10-CM

## 2023-05-10 DIAGNOSIS — F43.10 PTSD (POST-TRAUMATIC STRESS DISORDER): ICD-10-CM

## 2023-05-11 RX ORDER — PRAZOSIN HYDROCHLORIDE 2 MG/1
2 CAPSULE ORAL NIGHTLY
Qty: 90 CAPSULE | Refills: 1 | Status: SHIPPED | OUTPATIENT
Start: 2023-05-11

## 2023-05-11 RX ORDER — PRAZOSIN HYDROCHLORIDE 5 MG/1
CAPSULE ORAL
Qty: 90 CAPSULE | Refills: 1 | Status: SHIPPED | OUTPATIENT
Start: 2023-05-11

## 2023-05-11 RX ORDER — BUSPIRONE HYDROCHLORIDE 10 MG/1
10 TABLET ORAL 3 TIMES DAILY
Qty: 90 TABLET | Refills: 2 | Status: SHIPPED | OUTPATIENT
Start: 2023-05-11

## 2023-10-04 NOTE — PROGRESS NOTES
blood donor screening, associated  re-entry  protocols, or for screening Human Cell, Tissues and Cellular  Tissue-Based  Products (HCT/P). Performed by [facility],  [address] [place], [place] [id number]  www. Nona Garza MD, PHD - Lab. Director      Hepatitis C Genotype 2023 3a   Final    Comment: INTERPRETIVE INFORMATION:  Hepatitis C Genotyping  Hepatitis C Viral RNA is tested using reverse transcription polymerase  chain  reaction (RT-PCR) to amplify a specific portion of the 5' untranslated  region  (5' UTR) of the viral genome. The amplified nucleic acid is sequenced  bi-directionally using dye-terminator chemistry ([employer]). Sequencing data  is  compared to a database of characterized sequences. Isolates of hepatitis C virus are grouped into six major genotypes  (1-6). These genotypes are subtyped according to sequence characteristics. Due  to  high conservation of the 5' un-translated region of the HCV genome,  this test  has limitations in differentiating subtype 1a from 1b. Therefore, these  subtypes will be reported as 1a or 1b. In rare instances, Type 6 virus  may be  misclassified as Type 1. This test was developed and its performance characteristics determined  by  [employer]. It has not been cleared or approved by the [employer]  and  Drug Administration. This t                           est was performed in a CLIA certified  laboratory  and is intended for clinical purposes.   Performed By: [address]  : Wai Fields MD, PhD         EK/10/2021 QTc 426        Emiliano Madsen MD  Psychiatrist

## 2023-10-06 ENCOUNTER — OFFICE VISIT (OUTPATIENT)
Dept: PSYCHIATRY | Age: 33
End: 2023-10-06

## 2023-10-06 VITALS
BODY MASS INDEX: 24.29 KG/M2 | HEIGHT: 62 IN | WEIGHT: 132 LBS | DIASTOLIC BLOOD PRESSURE: 96 MMHG | SYSTOLIC BLOOD PRESSURE: 148 MMHG | HEART RATE: 109 BPM

## 2023-10-06 DIAGNOSIS — F40.10 SOCIAL ANXIETY DISORDER: ICD-10-CM

## 2023-10-06 DIAGNOSIS — F60.3 BORDERLINE PERSONALITY DISORDER (HCC): ICD-10-CM

## 2023-10-06 DIAGNOSIS — F90.0 ADHD, PREDOMINANTLY INATTENTIVE TYPE: Primary | ICD-10-CM

## 2023-10-06 DIAGNOSIS — F43.10 PTSD (POST-TRAUMATIC STRESS DISORDER): ICD-10-CM

## 2023-10-06 PROCEDURE — 3077F SYST BP >= 140 MM HG: CPT | Performed by: STUDENT IN AN ORGANIZED HEALTH CARE EDUCATION/TRAINING PROGRAM

## 2023-10-06 PROCEDURE — 3080F DIAST BP >= 90 MM HG: CPT | Performed by: STUDENT IN AN ORGANIZED HEALTH CARE EDUCATION/TRAINING PROGRAM

## 2023-10-06 PROCEDURE — 99214 OFFICE O/P EST MOD 30 MIN: CPT | Performed by: STUDENT IN AN ORGANIZED HEALTH CARE EDUCATION/TRAINING PROGRAM

## 2023-10-06 RX ORDER — ATOMOXETINE 25 MG/1
25 CAPSULE ORAL DAILY
Qty: 30 CAPSULE | Refills: 3 | Status: SHIPPED | OUTPATIENT
Start: 2023-10-06

## 2023-10-06 ASSESSMENT — ANXIETY QUESTIONNAIRES
4. TROUBLE RELAXING: 2
7. FEELING AFRAID AS IF SOMETHING AWFUL MIGHT HAPPEN: 0
GAD7 TOTAL SCORE: 7
IF YOU CHECKED OFF ANY PROBLEMS ON THIS QUESTIONNAIRE, HOW DIFFICULT HAVE THESE PROBLEMS MADE IT FOR YOU TO DO YOUR WORK, TAKE CARE OF THINGS AT HOME, OR GET ALONG WITH OTHER PEOPLE: SOMEWHAT DIFFICULT
5. BEING SO RESTLESS THAT IT IS HARD TO SIT STILL: 1
1. FEELING NERVOUS, ANXIOUS, OR ON EDGE: 1
6. BECOMING EASILY ANNOYED OR IRRITABLE: 1
2. NOT BEING ABLE TO STOP OR CONTROL WORRYING: 1
3. WORRYING TOO MUCH ABOUT DIFFERENT THINGS: 1

## 2023-10-06 ASSESSMENT — PATIENT HEALTH QUESTIONNAIRE - PHQ9
10. IF YOU CHECKED OFF ANY PROBLEMS, HOW DIFFICULT HAVE THESE PROBLEMS MADE IT FOR YOU TO DO YOUR WORK, TAKE CARE OF THINGS AT HOME, OR GET ALONG WITH OTHER PEOPLE: 1
2. FEELING DOWN, DEPRESSED OR HOPELESS: 1
SUM OF ALL RESPONSES TO PHQ QUESTIONS 1-9: 5
9. THOUGHTS THAT YOU WOULD BE BETTER OFF DEAD, OR OF HURTING YOURSELF: 0
4. FEELING TIRED OR HAVING LITTLE ENERGY: 0
6. FEELING BAD ABOUT YOURSELF - OR THAT YOU ARE A FAILURE OR HAVE LET YOURSELF OR YOUR FAMILY DOWN: 0
SUM OF ALL RESPONSES TO PHQ9 QUESTIONS 1 & 2: 1
7. TROUBLE CONCENTRATING ON THINGS, SUCH AS READING THE NEWSPAPER OR WATCHING TELEVISION: 2
5. POOR APPETITE OR OVEREATING: 0
3. TROUBLE FALLING OR STAYING ASLEEP: 1
8. MOVING OR SPEAKING SO SLOWLY THAT OTHER PEOPLE COULD HAVE NOTICED. OR THE OPPOSITE, BEING SO FIGETY OR RESTLESS THAT YOU HAVE BEEN MOVING AROUND A LOT MORE THAN USUAL: 1
1. LITTLE INTEREST OR PLEASURE IN DOING THINGS: 0

## 2023-10-06 ASSESSMENT — ENCOUNTER SYMPTOMS
ALLERGIC/IMMUNOLOGIC NEGATIVE: 1
RESPIRATORY NEGATIVE: 1
GASTROINTESTINAL NEGATIVE: 1
EYES NEGATIVE: 1

## 2023-10-11 ENCOUNTER — OFFICE VISIT (OUTPATIENT)
Dept: PRIMARY CARE CLINIC | Age: 33
End: 2023-10-11

## 2023-10-11 VITALS
SYSTOLIC BLOOD PRESSURE: 124 MMHG | WEIGHT: 138.8 LBS | HEIGHT: 64 IN | DIASTOLIC BLOOD PRESSURE: 78 MMHG | HEART RATE: 80 BPM | BODY MASS INDEX: 23.7 KG/M2 | TEMPERATURE: 99 F

## 2023-10-11 DIAGNOSIS — B18.2 CHRONIC HEPATITIS C WITHOUT HEPATIC COMA (HCC): ICD-10-CM

## 2023-10-11 DIAGNOSIS — I10 ESSENTIAL HYPERTENSION: Primary | ICD-10-CM

## 2023-10-11 DIAGNOSIS — R06.02 SHORTNESS OF BREATH: ICD-10-CM

## 2023-10-11 PROCEDURE — 3074F SYST BP LT 130 MM HG: CPT | Performed by: STUDENT IN AN ORGANIZED HEALTH CARE EDUCATION/TRAINING PROGRAM

## 2023-10-11 PROCEDURE — 99214 OFFICE O/P EST MOD 30 MIN: CPT | Performed by: STUDENT IN AN ORGANIZED HEALTH CARE EDUCATION/TRAINING PROGRAM

## 2023-10-11 PROCEDURE — 3078F DIAST BP <80 MM HG: CPT | Performed by: STUDENT IN AN ORGANIZED HEALTH CARE EDUCATION/TRAINING PROGRAM

## 2023-10-11 RX ORDER — AMLODIPINE BESYLATE 2.5 MG/1
2.5 TABLET ORAL DAILY
Qty: 90 TABLET | Refills: 1 | Status: SHIPPED | OUTPATIENT
Start: 2023-10-11

## 2023-10-11 RX ORDER — ALBUTEROL SULFATE 90 UG/1
2 AEROSOL, METERED RESPIRATORY (INHALATION) 4 TIMES DAILY PRN
Qty: 54 G | Refills: 1 | Status: SHIPPED | OUTPATIENT
Start: 2023-10-11

## 2023-10-11 ASSESSMENT — ENCOUNTER SYMPTOMS
COUGH: 0
SHORTNESS OF BREATH: 0
CHEST TIGHTNESS: 0

## 2023-10-11 NOTE — PROGRESS NOTES
deficit present. Mental Status: She is alert. Mental status is at baseline. Psychiatric:         Mood and Affect: Mood normal.         Behavior: Behavior normal.         Thought Content: Thought content normal.         Judgment: Judgment normal.         ASSESSMENT/PLAN:  1. Essential hypertension  Blood pressure at goal today. Tolerating current regimen well without side effects. Refills given. Routine follow up in 6 months. BP Readings from Last 3 Encounters:   10/11/23 124/78   10/06/23 (!) 148/96   04/06/23 122/76     - amLODIPine (NORVASC) 2.5 MG tablet; Take 1 tablet by mouth daily  Dispense: 90 tablet; Refill: 1    2. Chronic hepatitis C without hepatic coma (HCC)  Quant and genotype confirm persistence of infection. Referring for treatment. - Suleman Mondragon MD, Gastroenterology, [facility]    Return in about 6 months (around 4/11/2024) for Blood Pressure. An electronic signature was used to authenticate this note.     --Juan Rojas, DO on 10/11/2023 at 10:01 AM

## 2023-11-13 NOTE — PROGRESS NOTES
PSYCHIATRY PROGRESS NOTE    Malik Velizing Steph Silveira  1990  11/14/2023  Face to Face time: 30 minutes  PCP: Juan Rjoas DO    CC:   Chief Complaint   Patient presents with    Follow-up       Patient is a 29 y.o. female without significant past medical history who presents to the outpatient psychiatry clinic today for evaluation and management of depression and anxiety. A:  Patient's presentation today is indicative of some mild continued difficulties with ADHD. We will attempt to adjust her regimen to provide her with further support. Diagnosis:  PTSD, chronic  MDD R, severe without psychotic features (versus possible dysthymic disorder)  Generalized anxiety disorder  Social anxiety disorder  Borderline personality disorder  Opioid use disorder, severe, in sustained remission  Polysubstance use, in sustained remission (cocaine use, hallucinogen use, cannabis use, amphetamine use)  ADHD inattentive subtype predominant    P:   1. Increase atomoxetine to 50 mg daily for treatment of ADHD. Patient was cautioned regarding adverse effects of this medication as have been described to her previously    Medication Monitoring:    - PDMP reviewed: No current prescriptions    Follow-up: 6 weeks    Safety: Pt was counseled on the potential for increased suicidal ideations and advised on potential options for dealing with these including hotlines, calling the office, or going to the nearest emergency room. __________________________________________________________________________    S:   Patient endorsed that she was doing all right at the moment. She was having some stress because she was trying to find someone to adopt her dog. Unfortunately, the patient found that between combination of school and 2 jobs she was not having enough time to adequately care for the animal.  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     Patient

## 2023-11-14 ENCOUNTER — OFFICE VISIT (OUTPATIENT)
Dept: PSYCHIATRY | Age: 33
End: 2023-11-14
Payer: COMMERCIAL

## 2023-11-14 VITALS
HEIGHT: 64 IN | DIASTOLIC BLOOD PRESSURE: 102 MMHG | WEIGHT: 132 LBS | BODY MASS INDEX: 22.53 KG/M2 | SYSTOLIC BLOOD PRESSURE: 137 MMHG | HEART RATE: 90 BPM

## 2023-11-14 DIAGNOSIS — F43.10 PTSD (POST-TRAUMATIC STRESS DISORDER): ICD-10-CM

## 2023-11-14 DIAGNOSIS — F40.10 SOCIAL ANXIETY DISORDER: ICD-10-CM

## 2023-11-14 DIAGNOSIS — F90.0 ADHD, PREDOMINANTLY INATTENTIVE TYPE: Primary | ICD-10-CM

## 2023-11-14 DIAGNOSIS — F33.2 SEVERE EPISODE OF RECURRENT MAJOR DEPRESSIVE DISORDER, WITHOUT PSYCHOTIC FEATURES (HCC): ICD-10-CM

## 2023-11-14 PROCEDURE — 3078F DIAST BP <80 MM HG: CPT | Performed by: STUDENT IN AN ORGANIZED HEALTH CARE EDUCATION/TRAINING PROGRAM

## 2023-11-14 PROCEDURE — 99214 OFFICE O/P EST MOD 30 MIN: CPT | Performed by: STUDENT IN AN ORGANIZED HEALTH CARE EDUCATION/TRAINING PROGRAM

## 2023-11-14 PROCEDURE — 3074F SYST BP LT 130 MM HG: CPT | Performed by: STUDENT IN AN ORGANIZED HEALTH CARE EDUCATION/TRAINING PROGRAM

## 2023-11-14 RX ORDER — ATOMOXETINE 25 MG/1
50 CAPSULE ORAL DAILY
Qty: 60 CAPSULE | Refills: 2 | Status: SHIPPED | OUTPATIENT
Start: 2023-11-14

## 2023-11-14 ASSESSMENT — PATIENT HEALTH QUESTIONNAIRE - PHQ9
SUM OF ALL RESPONSES TO PHQ QUESTIONS 1-9: 11
7. TROUBLE CONCENTRATING ON THINGS, SUCH AS READING THE NEWSPAPER OR WATCHING TELEVISION: 1
SUM OF ALL RESPONSES TO PHQ9 QUESTIONS 1 & 2: 3
1. LITTLE INTEREST OR PLEASURE IN DOING THINGS: 1
4. FEELING TIRED OR HAVING LITTLE ENERGY: 2
2. FEELING DOWN, DEPRESSED OR HOPELESS: 2
10. IF YOU CHECKED OFF ANY PROBLEMS, HOW DIFFICULT HAVE THESE PROBLEMS MADE IT FOR YOU TO DO YOUR WORK, TAKE CARE OF THINGS AT HOME, OR GET ALONG WITH OTHER PEOPLE: 1
9. THOUGHTS THAT YOU WOULD BE BETTER OFF DEAD, OR OF HURTING YOURSELF: 1
8. MOVING OR SPEAKING SO SLOWLY THAT OTHER PEOPLE COULD HAVE NOTICED. OR THE OPPOSITE, BEING SO FIGETY OR RESTLESS THAT YOU HAVE BEEN MOVING AROUND A LOT MORE THAN USUAL: 1
5. POOR APPETITE OR OVEREATING: 1
SUM OF ALL RESPONSES TO PHQ QUESTIONS 1-9: 10
6. FEELING BAD ABOUT YOURSELF - OR THAT YOU ARE A FAILURE OR HAVE LET YOURSELF OR YOUR FAMILY DOWN: 1
3. TROUBLE FALLING OR STAYING ASLEEP: 1

## 2023-11-14 ASSESSMENT — ANXIETY QUESTIONNAIRES
IF YOU CHECKED OFF ANY PROBLEMS ON THIS QUESTIONNAIRE, HOW DIFFICULT HAVE THESE PROBLEMS MADE IT FOR YOU TO DO YOUR WORK, TAKE CARE OF THINGS AT HOME, OR GET ALONG WITH OTHER PEOPLE: SOMEWHAT DIFFICULT
5. BEING SO RESTLESS THAT IT IS HARD TO SIT STILL: 0
GAD7 TOTAL SCORE: 2
1. FEELING NERVOUS, ANXIOUS, OR ON EDGE: 0
7. FEELING AFRAID AS IF SOMETHING AWFUL MIGHT HAPPEN: 0
2. NOT BEING ABLE TO STOP OR CONTROL WORRYING: 1
4. TROUBLE RELAXING: 0
6. BECOMING EASILY ANNOYED OR IRRITABLE: 0
3. WORRYING TOO MUCH ABOUT DIFFERENT THINGS: 1

## 2024-01-03 NOTE — PROGRESS NOTES
date as of yet.    The patient indicated that she's doing well on the current medications.  She feels her focus is ideal, doing what she needs to do to get herself up and moving through life.  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    The patient denied any SI/HI/AVH on evaluation.    ROS:  Review of Systems   Constitutional: Negative.    HENT: Negative.     Eyes: Negative.    Respiratory: Negative.     Cardiovascular: Negative.    Gastrointestinal: Negative.    Endocrine: Negative.    Genitourinary: Negative.    Musculoskeletal: Negative.    Skin: Negative.    Allergic/Immunologic: Negative.    Neurological: Negative.    Hematological: Negative.         Brief Medical Hx:   Patient Active Problem List   Diagnosis    Opioid use disorder, severe, in sustained remission (HCC)    Hepatitis C    Cigarette nicotine dependence without complication    Severe episode of recurrent major depressive disorder, without psychotic features (HCC)    PTSD (post-traumatic stress disorder)    Generalized anxiety disorder    Social anxiety disorder    Borderline personality disorder (HCC)    Essential hypertension    ADHD, predominantly inattentive type        Brief Psych Hx:  Hosp: 2 prior hospitalizations in her teenage years  Diagnoses: Depression  Med trials: Adderall (childhood, stopped age 15), atomoxetine (nausea) buspirone, duloxetine, prazosin, sertraline (GI SE)  Outpt: Previously done therapy but not currently.  Previously had outpatient psychiatry help but not recently  NSSI: History of cutting behaviors during her teenage years.  Patient has not engaged in this in greater than 10 years  Suicide Attempts: 4-5 suicide attempts.  Last attempt was at age 18.  All attempts were done using overdoses of heroin    O:  Wt Readings from Last 3 Encounters:   01/05/24 59.9 kg (132 lb)   11/14/23 59.9 kg (132 lb)   10/11/23 63 kg (138 lb 12.8 oz)       Vitals:    01/05/24 1340

## 2024-01-05 ENCOUNTER — OFFICE VISIT (OUTPATIENT)
Dept: PSYCHIATRY | Age: 34
End: 2024-01-05
Payer: COMMERCIAL

## 2024-01-05 VITALS
DIASTOLIC BLOOD PRESSURE: 93 MMHG | HEART RATE: 96 BPM | SYSTOLIC BLOOD PRESSURE: 140 MMHG | WEIGHT: 132 LBS | BODY MASS INDEX: 22.66 KG/M2

## 2024-01-05 DIAGNOSIS — F90.0 ADHD, PREDOMINANTLY INATTENTIVE TYPE: ICD-10-CM

## 2024-01-05 DIAGNOSIS — F43.10 PTSD (POST-TRAUMATIC STRESS DISORDER): Primary | ICD-10-CM

## 2024-01-05 DIAGNOSIS — F60.3 BORDERLINE PERSONALITY DISORDER (HCC): ICD-10-CM

## 2024-01-05 DIAGNOSIS — F40.10 SOCIAL ANXIETY DISORDER: ICD-10-CM

## 2024-01-05 PROCEDURE — 3077F SYST BP >= 140 MM HG: CPT | Performed by: STUDENT IN AN ORGANIZED HEALTH CARE EDUCATION/TRAINING PROGRAM

## 2024-01-05 PROCEDURE — 99214 OFFICE O/P EST MOD 30 MIN: CPT | Performed by: STUDENT IN AN ORGANIZED HEALTH CARE EDUCATION/TRAINING PROGRAM

## 2024-01-05 PROCEDURE — 3080F DIAST BP >= 90 MM HG: CPT | Performed by: STUDENT IN AN ORGANIZED HEALTH CARE EDUCATION/TRAINING PROGRAM

## 2024-01-05 RX ORDER — DEXTROAMPHETAMINE SACCHARATE, AMPHETAMINE ASPARTATE, DEXTROAMPHETAMINE SULFATE AND AMPHETAMINE SULFATE 2.5; 2.5; 2.5; 2.5 MG/1; MG/1; MG/1; MG/1
10 TABLET ORAL 2 TIMES DAILY
Qty: 60 TABLET | Refills: 0 | Status: SHIPPED | OUTPATIENT
Start: 2024-02-02 | End: 2024-03-03

## 2024-01-05 RX ORDER — DEXTROAMPHETAMINE SACCHARATE, AMPHETAMINE ASPARTATE, DEXTROAMPHETAMINE SULFATE AND AMPHETAMINE SULFATE 2.5; 2.5; 2.5; 2.5 MG/1; MG/1; MG/1; MG/1
10 TABLET ORAL 2 TIMES DAILY
Qty: 60 TABLET | Refills: 0 | Status: SHIPPED | OUTPATIENT
Start: 2024-01-05 | End: 2024-02-04

## 2024-01-05 ASSESSMENT — ANXIETY QUESTIONNAIRES
4. TROUBLE RELAXING: 0
1. FEELING NERVOUS, ANXIOUS, OR ON EDGE: 1
7. FEELING AFRAID AS IF SOMETHING AWFUL MIGHT HAPPEN: 0
GAD7 TOTAL SCORE: 4
3. WORRYING TOO MUCH ABOUT DIFFERENT THINGS: 1
2. NOT BEING ABLE TO STOP OR CONTROL WORRYING: 0
6. BECOMING EASILY ANNOYED OR IRRITABLE: 1
5. BEING SO RESTLESS THAT IT IS HARD TO SIT STILL: 1
IF YOU CHECKED OFF ANY PROBLEMS ON THIS QUESTIONNAIRE, HOW DIFFICULT HAVE THESE PROBLEMS MADE IT FOR YOU TO DO YOUR WORK, TAKE CARE OF THINGS AT HOME, OR GET ALONG WITH OTHER PEOPLE: SOMEWHAT DIFFICULT

## 2024-01-05 ASSESSMENT — PATIENT HEALTH QUESTIONNAIRE - PHQ9
6. FEELING BAD ABOUT YOURSELF - OR THAT YOU ARE A FAILURE OR HAVE LET YOURSELF OR YOUR FAMILY DOWN: 1
2. FEELING DOWN, DEPRESSED OR HOPELESS: 0
SUM OF ALL RESPONSES TO PHQ9 QUESTIONS 1 & 2: 1
SUM OF ALL RESPONSES TO PHQ QUESTIONS 1-9: 3
7. TROUBLE CONCENTRATING ON THINGS, SUCH AS READING THE NEWSPAPER OR WATCHING TELEVISION: 0
10. IF YOU CHECKED OFF ANY PROBLEMS, HOW DIFFICULT HAVE THESE PROBLEMS MADE IT FOR YOU TO DO YOUR WORK, TAKE CARE OF THINGS AT HOME, OR GET ALONG WITH OTHER PEOPLE: 1
4. FEELING TIRED OR HAVING LITTLE ENERGY: 0
5. POOR APPETITE OR OVEREATING: 0
9. THOUGHTS THAT YOU WOULD BE BETTER OFF DEAD, OR OF HURTING YOURSELF: 0
SUM OF ALL RESPONSES TO PHQ QUESTIONS 1-9: 3
SUM OF ALL RESPONSES TO PHQ QUESTIONS 1-9: 3
1. LITTLE INTEREST OR PLEASURE IN DOING THINGS: 1
3. TROUBLE FALLING OR STAYING ASLEEP: 1
8. MOVING OR SPEAKING SO SLOWLY THAT OTHER PEOPLE COULD HAVE NOTICED. OR THE OPPOSITE, BEING SO FIGETY OR RESTLESS THAT YOU HAVE BEEN MOVING AROUND A LOT MORE THAN USUAL: 0
SUM OF ALL RESPONSES TO PHQ QUESTIONS 1-9: 3

## 2024-03-05 DIAGNOSIS — F90.0 ADHD, PREDOMINANTLY INATTENTIVE TYPE: ICD-10-CM

## 2024-03-06 RX ORDER — DEXTROAMPHETAMINE SACCHARATE, AMPHETAMINE ASPARTATE, DEXTROAMPHETAMINE SULFATE AND AMPHETAMINE SULFATE 2.5; 2.5; 2.5; 2.5 MG/1; MG/1; MG/1; MG/1
10 TABLET ORAL 2 TIMES DAILY
Qty: 60 TABLET | Refills: 0 | Status: SHIPPED | OUTPATIENT
Start: 2024-03-06 | End: 2024-04-05 | Stop reason: SDUPTHER

## 2024-03-06 NOTE — PROGRESS NOTES
PSYCHIATRY PROGRESS NOTE    Cyn Mix  1990  03/08/2024  Face to Face time: 30 minutes  PCP: Juan Saxena DO    CC:   Chief Complaint   Patient presents with    Follow-up       Patient is a 33 y.o. female without significant past medical history who presents to the outpatient psychiatry clinic today for evaluation and management of depression and anxiety.    A:  Patient's presentation today is indicative of continued difficulties with anxiety that is not managed by her current medication regimen.  We will attempt to adjust things to provide her with further support.    Diagnosis:  PTSD, chronic  MDD R, severe without psychotic features (versus possible dysthymic disorder)  Generalized anxiety disorder  Social anxiety disorder  Borderline personality disorder  Opioid use disorder, severe, in sustained remission  Polysubstance use, in sustained remission (cocaine use, hallucinogen use, cannabis use, amphetamine use)  ADHD inattentive subtype predominant    P:   1.  Continue current medication of Adderall IR 10 mg twice daily.  2.  Add back buspirone 10 mg twice daily for treatment of anxiety.  Patient was cautioned regarding adverse effects of this medication as have been described to her previously.    Medication Monitoring:    - PDMP reviewed: Adderall IR 10mg bid 30-day supply filled 3/6/2024.     Follow-up: 2 months    Safety: Pt was counseled on the potential for increased suicidal ideations and advised on potential options for dealing with these including hotlines, calling the office, or going to the nearest emergency room.      __________________________________________________________________________    S:   Patient identified that she has had some issues lately being back at her mother's and especially in dealing with her anxiety.  She finds that her anxiety has been raised, causing her excess irritability as well as a return of some nightmares and night terrors.  Patient states that some of

## 2024-03-08 ENCOUNTER — OFFICE VISIT (OUTPATIENT)
Dept: PSYCHIATRY | Age: 34
End: 2024-03-08
Payer: COMMERCIAL

## 2024-03-08 VITALS
HEART RATE: 110 BPM | BODY MASS INDEX: 22.53 KG/M2 | SYSTOLIC BLOOD PRESSURE: 134 MMHG | HEIGHT: 64 IN | DIASTOLIC BLOOD PRESSURE: 97 MMHG | WEIGHT: 132 LBS | OXYGEN SATURATION: 97 %

## 2024-03-08 DIAGNOSIS — F33.2 SEVERE EPISODE OF RECURRENT MAJOR DEPRESSIVE DISORDER, WITHOUT PSYCHOTIC FEATURES (HCC): ICD-10-CM

## 2024-03-08 DIAGNOSIS — F43.10 PTSD (POST-TRAUMATIC STRESS DISORDER): ICD-10-CM

## 2024-03-08 DIAGNOSIS — F41.1 GENERALIZED ANXIETY DISORDER: Primary | ICD-10-CM

## 2024-03-08 DIAGNOSIS — F60.3 BORDERLINE PERSONALITY DISORDER (HCC): ICD-10-CM

## 2024-03-08 DIAGNOSIS — F90.0 ADHD, PREDOMINANTLY INATTENTIVE TYPE: ICD-10-CM

## 2024-03-08 PROCEDURE — 3075F SYST BP GE 130 - 139MM HG: CPT | Performed by: STUDENT IN AN ORGANIZED HEALTH CARE EDUCATION/TRAINING PROGRAM

## 2024-03-08 PROCEDURE — 99214 OFFICE O/P EST MOD 30 MIN: CPT | Performed by: STUDENT IN AN ORGANIZED HEALTH CARE EDUCATION/TRAINING PROGRAM

## 2024-03-08 PROCEDURE — 3080F DIAST BP >= 90 MM HG: CPT | Performed by: STUDENT IN AN ORGANIZED HEALTH CARE EDUCATION/TRAINING PROGRAM

## 2024-03-08 RX ORDER — BUSPIRONE HYDROCHLORIDE 10 MG/1
10 TABLET ORAL 2 TIMES DAILY
Qty: 60 TABLET | Refills: 2 | Status: SHIPPED | OUTPATIENT
Start: 2024-03-08

## 2024-03-08 ASSESSMENT — PATIENT HEALTH QUESTIONNAIRE - PHQ9
8. MOVING OR SPEAKING SO SLOWLY THAT OTHER PEOPLE COULD HAVE NOTICED. OR THE OPPOSITE, BEING SO FIGETY OR RESTLESS THAT YOU HAVE BEEN MOVING AROUND A LOT MORE THAN USUAL: 2
9. THOUGHTS THAT YOU WOULD BE BETTER OFF DEAD, OR OF HURTING YOURSELF: 1
10. IF YOU CHECKED OFF ANY PROBLEMS, HOW DIFFICULT HAVE THESE PROBLEMS MADE IT FOR YOU TO DO YOUR WORK, TAKE CARE OF THINGS AT HOME, OR GET ALONG WITH OTHER PEOPLE: 1
1. LITTLE INTEREST OR PLEASURE IN DOING THINGS: 1
4. FEELING TIRED OR HAVING LITTLE ENERGY: 2
SUM OF ALL RESPONSES TO PHQ QUESTIONS 1-9: 16
SUM OF ALL RESPONSES TO PHQ QUESTIONS 1-9: 16
SUM OF ALL RESPONSES TO PHQ QUESTIONS 1-9: 15
SUM OF ALL RESPONSES TO PHQ9 QUESTIONS 1 & 2: 3
2. FEELING DOWN, DEPRESSED OR HOPELESS: 2
SUM OF ALL RESPONSES TO PHQ QUESTIONS 1-9: 16
3. TROUBLE FALLING OR STAYING ASLEEP: 2
6. FEELING BAD ABOUT YOURSELF - OR THAT YOU ARE A FAILURE OR HAVE LET YOURSELF OR YOUR FAMILY DOWN: 2
7. TROUBLE CONCENTRATING ON THINGS, SUCH AS READING THE NEWSPAPER OR WATCHING TELEVISION: 2
5. POOR APPETITE OR OVEREATING: 2

## 2024-03-08 ASSESSMENT — ANXIETY QUESTIONNAIRES
GAD7 TOTAL SCORE: 11
6. BECOMING EASILY ANNOYED OR IRRITABLE: 2
2. NOT BEING ABLE TO STOP OR CONTROL WORRYING: 1
3. WORRYING TOO MUCH ABOUT DIFFERENT THINGS: 2
IF YOU CHECKED OFF ANY PROBLEMS ON THIS QUESTIONNAIRE, HOW DIFFICULT HAVE THESE PROBLEMS MADE IT FOR YOU TO DO YOUR WORK, TAKE CARE OF THINGS AT HOME, OR GET ALONG WITH OTHER PEOPLE: SOMEWHAT DIFFICULT
7. FEELING AFRAID AS IF SOMETHING AWFUL MIGHT HAPPEN: 0
1. FEELING NERVOUS, ANXIOUS, OR ON EDGE: 2
5. BEING SO RESTLESS THAT IT IS HARD TO SIT STILL: 2
4. TROUBLE RELAXING: 2

## 2024-03-13 ASSESSMENT — ENCOUNTER SYMPTOMS
ALLERGIC/IMMUNOLOGIC NEGATIVE: 1
EYES NEGATIVE: 1
RESPIRATORY NEGATIVE: 1
GASTROINTESTINAL NEGATIVE: 1

## 2024-04-05 DIAGNOSIS — F90.0 ADHD, PREDOMINANTLY INATTENTIVE TYPE: ICD-10-CM

## 2024-04-05 RX ORDER — DEXTROAMPHETAMINE SACCHARATE, AMPHETAMINE ASPARTATE, DEXTROAMPHETAMINE SULFATE AND AMPHETAMINE SULFATE 2.5; 2.5; 2.5; 2.5 MG/1; MG/1; MG/1; MG/1
10 TABLET ORAL 2 TIMES DAILY
Qty: 60 TABLET | Refills: 0 | Status: SHIPPED | OUTPATIENT
Start: 2024-04-05 | End: 2024-05-04 | Stop reason: SDUPTHER

## 2024-04-10 DIAGNOSIS — F41.1 GENERALIZED ANXIETY DISORDER: ICD-10-CM

## 2024-04-10 RX ORDER — BUSPIRONE HYDROCHLORIDE 10 MG/1
10 TABLET ORAL 2 TIMES DAILY
Qty: 180 TABLET | Refills: 1 | OUTPATIENT
Start: 2024-04-10

## 2024-05-04 DIAGNOSIS — F90.0 ADHD, PREDOMINANTLY INATTENTIVE TYPE: ICD-10-CM

## 2024-05-06 RX ORDER — DEXTROAMPHETAMINE SACCHARATE, AMPHETAMINE ASPARTATE, DEXTROAMPHETAMINE SULFATE AND AMPHETAMINE SULFATE 2.5; 2.5; 2.5; 2.5 MG/1; MG/1; MG/1; MG/1
10 TABLET ORAL 2 TIMES DAILY
Qty: 60 TABLET | Refills: 0 | Status: SHIPPED | OUTPATIENT
Start: 2024-05-06 | End: 2024-05-10 | Stop reason: SDUPTHER

## 2024-05-08 NOTE — PROGRESS NOTES
PSYCHIATRY PROGRESS NOTE    Cyn Mix  1990  05/14/2024  Face to Face time: 30 minutes  PCP: Juan Saxena DO    CC:   Chief Complaint   Patient presents with    Follow-up       Patient is a 34 y.o. female without significant past medical history who presents to the outpatient psychiatry clinic today for evaluation and management of depression and anxiety.    A:  Patient's presentation today is indicative of relative stability of her underlying mental health conditions with the exception of ADHD which she continues to struggle with.  We will attempt to adjust her regimen to provide her with further support.    Diagnosis:  PTSD, chronic  MDD R, severe without psychotic features (versus possible dysthymic disorder)  Generalized anxiety disorder  Social anxiety disorder  Borderline personality disorder  Opioid use disorder, severe, in sustained remission  Polysubstance use, in sustained remission (cocaine use, hallucinogen use, cannabis use, amphetamine use)  ADHD inattentive subtype predominant    P:   1.  Increase Adderall IR to 20 mg twice daily for treatment of ADHD.  Patient was cautioned regarding adverse effects of this medication as have been described to her previously.  Patient will attempt to trial a 10 mg 3 times daily dosage schedule with this medication, though she may end up increasing to a formal IR 20 mg twice daily.  2.  Discontinue buspirone at this time    Medication Monitoring:    - PDMP reviewed: Adderall IR 10 mg twice daily 30-day supply last filled 5/6/2024.    Follow-up: 6 weeks    Safety: Pt was counseled on the potential for increased suicidal ideations and advised on potential options for dealing with these including hotlines, calling the office, or going to the nearest emergency room.      __________________________________________________________________________    S:   Patient identified that she had ultimately stopped taking the buspirone as she felt that it doubled her

## 2024-05-10 ENCOUNTER — OFFICE VISIT (OUTPATIENT)
Dept: PRIMARY CARE CLINIC | Age: 34
End: 2024-05-10
Payer: COMMERCIAL

## 2024-05-10 VITALS
WEIGHT: 132.6 LBS | HEIGHT: 64 IN | DIASTOLIC BLOOD PRESSURE: 78 MMHG | SYSTOLIC BLOOD PRESSURE: 130 MMHG | HEART RATE: 91 BPM | TEMPERATURE: 97.4 F | BODY MASS INDEX: 22.64 KG/M2

## 2024-05-10 DIAGNOSIS — B18.2 CHRONIC HEPATITIS C WITHOUT HEPATIC COMA (HCC): ICD-10-CM

## 2024-05-10 DIAGNOSIS — I10 ESSENTIAL HYPERTENSION: ICD-10-CM

## 2024-05-10 DIAGNOSIS — F90.0 ADHD, PREDOMINANTLY INATTENTIVE TYPE: ICD-10-CM

## 2024-05-10 DIAGNOSIS — Z00.00 ROUTINE GENERAL MEDICAL EXAMINATION AT A HEALTH CARE FACILITY: Primary | ICD-10-CM

## 2024-05-10 DIAGNOSIS — F17.210 CIGARETTE NICOTINE DEPENDENCE WITHOUT COMPLICATION: ICD-10-CM

## 2024-05-10 DIAGNOSIS — Z12.4 SCREENING FOR CERVICAL CANCER: ICD-10-CM

## 2024-05-10 PROCEDURE — 3075F SYST BP GE 130 - 139MM HG: CPT | Performed by: STUDENT IN AN ORGANIZED HEALTH CARE EDUCATION/TRAINING PROGRAM

## 2024-05-10 PROCEDURE — 99395 PREV VISIT EST AGE 18-39: CPT | Performed by: STUDENT IN AN ORGANIZED HEALTH CARE EDUCATION/TRAINING PROGRAM

## 2024-05-10 PROCEDURE — 3078F DIAST BP <80 MM HG: CPT | Performed by: STUDENT IN AN ORGANIZED HEALTH CARE EDUCATION/TRAINING PROGRAM

## 2024-05-10 RX ORDER — DEXTROAMPHETAMINE SACCHARATE, AMPHETAMINE ASPARTATE, DEXTROAMPHETAMINE SULFATE AND AMPHETAMINE SULFATE 2.5; 2.5; 2.5; 2.5 MG/1; MG/1; MG/1; MG/1
10 TABLET ORAL 2 TIMES DAILY
Qty: 60 TABLET | Refills: 0
Start: 2024-05-10 | End: 2024-06-09

## 2024-05-10 RX ORDER — AMLODIPINE BESYLATE 2.5 MG/1
2.5 TABLET ORAL DAILY
Qty: 90 TABLET | Refills: 1
Start: 2024-05-10

## 2024-05-10 ASSESSMENT — ENCOUNTER SYMPTOMS
CHEST TIGHTNESS: 0
STRIDOR: 0
SHORTNESS OF BREATH: 0
DIARRHEA: 0
CONSTIPATION: 0
BACK PAIN: 0
BLOOD IN STOOL: 0
ABDOMINAL PAIN: 0

## 2024-05-10 NOTE — PROGRESS NOTES
89    Temp: 97.4 °F (36.3 °C)    TempSrc: Temporal    Weight: 60.1 kg (132 lb 9.6 oz)    Height: 1.626 m (5' 4\")      Estimated body mass index is 22.76 kg/m² as calculated from the following:    Height as of this encounter: 1.626 m (5' 4\").    Weight as of this encounter: 60.1 kg (132 lb 9.6 oz).    Physical Exam  Vitals reviewed.   Constitutional:       Appearance: Normal appearance. She is normal weight.   HENT:      Head: Normocephalic and atraumatic.      Right Ear: Tympanic membrane, ear canal and external ear normal.      Left Ear: Tympanic membrane, ear canal and external ear normal.      Nose: Nose normal.      Mouth/Throat:      Mouth: Mucous membranes are moist.      Pharynx: Oropharynx is clear.   Eyes:      Extraocular Movements: Extraocular movements intact.      Conjunctiva/sclera: Conjunctivae normal.   Cardiovascular:      Rate and Rhythm: Normal rate and regular rhythm.      Pulses: Normal pulses.      Heart sounds: Normal heart sounds.   Pulmonary:      Effort: Pulmonary effort is normal.      Breath sounds: Normal breath sounds.   Abdominal:      General: Abdomen is flat. Bowel sounds are normal.      Palpations: Abdomen is soft.   Musculoskeletal:         General: Normal range of motion.      Cervical back: Normal range of motion and neck supple.   Skin:     General: Skin is warm and dry.      Capillary Refill: Capillary refill takes less than 2 seconds.      Findings: No rash.   Neurological:      General: No focal deficit present.      Mental Status: She is alert and oriented to person, place, and time. Mental status is at baseline.   Psychiatric:         Mood and Affect: Mood normal.         Behavior: Behavior normal.         Thought Content: Thought content normal.         Judgment: Judgment normal.       Separate Identifiable issues addressed today:      ASSESSMENT/PLAN:  Cyn was seen today for annual exam.    Diagnoses and all orders for this visit:    Routine general medical

## 2024-05-14 ENCOUNTER — OFFICE VISIT (OUTPATIENT)
Dept: PSYCHIATRY | Age: 34
End: 2024-05-14
Payer: COMMERCIAL

## 2024-05-14 VITALS
HEART RATE: 89 BPM | OXYGEN SATURATION: 99 % | WEIGHT: 132 LBS | SYSTOLIC BLOOD PRESSURE: 135 MMHG | BODY MASS INDEX: 22.53 KG/M2 | HEIGHT: 64 IN | DIASTOLIC BLOOD PRESSURE: 87 MMHG

## 2024-05-14 DIAGNOSIS — F40.10 SOCIAL ANXIETY DISORDER: ICD-10-CM

## 2024-05-14 DIAGNOSIS — F41.1 GENERALIZED ANXIETY DISORDER: ICD-10-CM

## 2024-05-14 DIAGNOSIS — F60.3 BORDERLINE PERSONALITY DISORDER (HCC): ICD-10-CM

## 2024-05-14 DIAGNOSIS — F90.0 ADHD, PREDOMINANTLY INATTENTIVE TYPE: Primary | ICD-10-CM

## 2024-05-14 DIAGNOSIS — F43.10 PTSD (POST-TRAUMATIC STRESS DISORDER): ICD-10-CM

## 2024-05-14 PROCEDURE — 99214 OFFICE O/P EST MOD 30 MIN: CPT | Performed by: STUDENT IN AN ORGANIZED HEALTH CARE EDUCATION/TRAINING PROGRAM

## 2024-05-14 PROCEDURE — 3075F SYST BP GE 130 - 139MM HG: CPT | Performed by: STUDENT IN AN ORGANIZED HEALTH CARE EDUCATION/TRAINING PROGRAM

## 2024-05-14 PROCEDURE — 3079F DIAST BP 80-89 MM HG: CPT | Performed by: STUDENT IN AN ORGANIZED HEALTH CARE EDUCATION/TRAINING PROGRAM

## 2024-05-14 RX ORDER — DEXTROAMPHETAMINE SACCHARATE, AMPHETAMINE ASPARTATE, DEXTROAMPHETAMINE SULFATE AND AMPHETAMINE SULFATE 5; 5; 5; 5 MG/1; MG/1; MG/1; MG/1
20 TABLET ORAL 2 TIMES DAILY
Qty: 60 TABLET | Refills: 0 | Status: SHIPPED | OUTPATIENT
Start: 2024-05-14 | End: 2024-06-13

## 2024-05-14 ASSESSMENT — ANXIETY QUESTIONNAIRES
5. BEING SO RESTLESS THAT IT IS HARD TO SIT STILL: MORE THAN HALF THE DAYS
4. TROUBLE RELAXING: SEVERAL DAYS
2. NOT BEING ABLE TO STOP OR CONTROL WORRYING: SEVERAL DAYS
1. FEELING NERVOUS, ANXIOUS, OR ON EDGE: SEVERAL DAYS
3. WORRYING TOO MUCH ABOUT DIFFERENT THINGS: MORE THAN HALF THE DAYS
7. FEELING AFRAID AS IF SOMETHING AWFUL MIGHT HAPPEN: SEVERAL DAYS
GAD7 TOTAL SCORE: 10
6. BECOMING EASILY ANNOYED OR IRRITABLE: MORE THAN HALF THE DAYS
IF YOU CHECKED OFF ANY PROBLEMS ON THIS QUESTIONNAIRE, HOW DIFFICULT HAVE THESE PROBLEMS MADE IT FOR YOU TO DO YOUR WORK, TAKE CARE OF THINGS AT HOME, OR GET ALONG WITH OTHER PEOPLE: SOMEWHAT DIFFICULT

## 2024-05-14 ASSESSMENT — PATIENT HEALTH QUESTIONNAIRE - PHQ9
SUM OF ALL RESPONSES TO PHQ QUESTIONS 1-9: 8
7. TROUBLE CONCENTRATING ON THINGS, SUCH AS READING THE NEWSPAPER OR WATCHING TELEVISION: SEVERAL DAYS
1. LITTLE INTEREST OR PLEASURE IN DOING THINGS: SEVERAL DAYS
SUM OF ALL RESPONSES TO PHQ QUESTIONS 1-9: 8
6. FEELING BAD ABOUT YOURSELF - OR THAT YOU ARE A FAILURE OR HAVE LET YOURSELF OR YOUR FAMILY DOWN: MORE THAN HALF THE DAYS
9. THOUGHTS THAT YOU WOULD BE BETTER OFF DEAD, OR OF HURTING YOURSELF: NOT AT ALL
SUM OF ALL RESPONSES TO PHQ QUESTIONS 1-9: 8
SUM OF ALL RESPONSES TO PHQ9 QUESTIONS 1 & 2: 1
2. FEELING DOWN, DEPRESSED OR HOPELESS: NOT AT ALL
3. TROUBLE FALLING OR STAYING ASLEEP: SEVERAL DAYS
5. POOR APPETITE OR OVEREATING: SEVERAL DAYS
8. MOVING OR SPEAKING SO SLOWLY THAT OTHER PEOPLE COULD HAVE NOTICED. OR THE OPPOSITE, BEING SO FIGETY OR RESTLESS THAT YOU HAVE BEEN MOVING AROUND A LOT MORE THAN USUAL: SEVERAL DAYS
4. FEELING TIRED OR HAVING LITTLE ENERGY: SEVERAL DAYS
10. IF YOU CHECKED OFF ANY PROBLEMS, HOW DIFFICULT HAVE THESE PROBLEMS MADE IT FOR YOU TO DO YOUR WORK, TAKE CARE OF THINGS AT HOME, OR GET ALONG WITH OTHER PEOPLE: SOMEWHAT DIFFICULT
SUM OF ALL RESPONSES TO PHQ QUESTIONS 1-9: 8

## 2024-05-14 NOTE — PATIENT INSTRUCTIONS
Trial taking 10mg of the Adderall at 0700, 1200, and 1700 (5pm).  Contact Dr. Hernandez if this regimen seems off.

## 2024-05-16 ENCOUNTER — OFFICE VISIT (OUTPATIENT)
Dept: GYNECOLOGY | Age: 34
End: 2024-05-16
Payer: COMMERCIAL

## 2024-05-16 VITALS
BODY MASS INDEX: 22.39 KG/M2 | WEIGHT: 131.17 LBS | HEART RATE: 70 BPM | HEIGHT: 64 IN | RESPIRATION RATE: 17 BRPM | SYSTOLIC BLOOD PRESSURE: 110 MMHG | DIASTOLIC BLOOD PRESSURE: 68 MMHG

## 2024-05-16 DIAGNOSIS — Z01.419 WELL WOMAN EXAM WITH ROUTINE GYNECOLOGICAL EXAM: Primary | ICD-10-CM

## 2024-05-16 PROCEDURE — 3074F SYST BP LT 130 MM HG: CPT | Performed by: OBSTETRICS & GYNECOLOGY

## 2024-05-16 PROCEDURE — 99395 PREV VISIT EST AGE 18-39: CPT | Performed by: OBSTETRICS & GYNECOLOGY

## 2024-05-16 PROCEDURE — 3078F DIAST BP <80 MM HG: CPT | Performed by: OBSTETRICS & GYNECOLOGY

## 2024-05-18 ASSESSMENT — ENCOUNTER SYMPTOMS
EYES NEGATIVE: 1
ALLERGIC/IMMUNOLOGIC NEGATIVE: 1
GASTROINTESTINAL NEGATIVE: 1
RESPIRATORY NEGATIVE: 1

## 2024-05-18 NOTE — PROGRESS NOTES
Physical Activity: Not on file   Stress: Not on file   Social Connections: Not on file   Intimate Partner Violence: Not on file   Housing Stability: Unknown (4/6/2023)    Housing Stability Vital Sign     Unable to Pay for Housing in the Last Year: Not on file     Number of Places Lived in the Last Year: Not on file     Unstable Housing in the Last Year: No     Allergies   Allergen Reactions    Latex Rash    Kiwi Extract Swelling     Throat swells up    Hydrocodone Itching     Outpatient Medications Marked as Taking for the 5/16/24 encounter (Office Visit) with Katia Perez MD   Medication Sig Dispense Refill    amphetamine-dextroamphetamine (ADDERALL, 20MG,) 20 MG tablet Take 1 tablet by mouth 2 times daily at 0800 and 1400 for 30 days. Max Daily Amount: 40 mg 60 tablet 0    amLODIPine (NORVASC) 2.5 MG tablet Take 1 tablet by mouth daily 90 tablet 1    albuterol sulfate HFA (VENTOLIN HFA) 108 (90 Base) MCG/ACT inhaler Inhale 2 puffs into the lungs 4 times daily as needed for Wheezing 54 g 1    cetirizine (ZYRTEC) 10 MG tablet Take 1 tablet by mouth daily       Family History   Problem Relation Age of Onset    Heart Disease Mother     Depression Mother     High Blood Pressure Father     Mental Illness Sister         mood disorder    Suicide Attempts Sister         OD    Mental Illness Sister         mood disorder    Mental Illness Brother         mood disorder    Cancer Maternal Aunt      /68 (Site: Right Upper Arm, Position: Sitting, Cuff Size: Medium Adult)   Pulse 70   Resp 17   Ht 1.626 m (5' 4\")   Wt 59.5 kg (131 lb 2.8 oz)   LMP 04/24/2024   BMI 22.52 kg/m²          Objective   Physical Exam  Constitutional:       Appearance: Normal appearance. She is well-developed and normal weight.   HENT:      Head: Normocephalic and atraumatic.      Nose: Nose normal.      Mouth/Throat:      Mouth: Mucous membranes are moist.      Pharynx: Oropharynx is clear.   Eyes:      Extraocular Movements:

## 2024-05-28 ASSESSMENT — ENCOUNTER SYMPTOMS
ALLERGIC/IMMUNOLOGIC NEGATIVE: 1
RESPIRATORY NEGATIVE: 1
EYES NEGATIVE: 1
GASTROINTESTINAL NEGATIVE: 1

## 2024-05-31 ENCOUNTER — TELEPHONE (OUTPATIENT)
Dept: GYNECOLOGY | Age: 34
End: 2024-05-31

## 2024-05-31 DIAGNOSIS — N89.8 VAGINAL IRRITATION: Primary | ICD-10-CM

## 2024-05-31 RX ORDER — CLINDAMYCIN PHOSPHATE 20 MG/G
1 CREAM VAGINAL NIGHTLY
Qty: 1 G | Refills: 0 | Status: SHIPPED | OUTPATIENT
Start: 2024-05-31 | End: 2024-06-07

## 2024-06-11 ENCOUNTER — TELEPHONE (OUTPATIENT)
Dept: GYNECOLOGY | Age: 34
End: 2024-06-11

## 2024-06-11 DIAGNOSIS — N92.6 IRREGULAR MENSTRUAL BLEEDING: Primary | ICD-10-CM

## 2024-06-11 NOTE — TELEPHONE ENCOUNTER
Patient just started bleeding yesterday  Right now the bleeding is light to moderate  The blood is bright red  4. Crams, headache and fatigue    She started taking medication (for BV) last Tuesday and he last one tonight.

## 2024-06-11 NOTE — TELEPHONE ENCOUNTER
Called patient to obtain more information in regarding to her cycles    How many days of the bleeding?   Was the bleeding light heavy or moderate   Color of the blood dark red, bright red  Is she having any pain?

## 2024-06-11 NOTE — TELEPHONE ENCOUNTER
Patient called office sating she had two full blown periods this month and is requesting to be seen.     Patient can be reached at [phone]

## 2024-06-12 DIAGNOSIS — N93.9 VAGINAL BLEEDING: Primary | ICD-10-CM

## 2024-06-12 NOTE — TELEPHONE ENCOUNTER
Placed order in system, gave her phone number to call and schedule her US. Also informed her that  Dr. Perez would like for her to have blood work done at a [facility]

## 2024-06-13 ENCOUNTER — TELEPHONE (OUTPATIENT)
Dept: GYNECOLOGY | Age: 34
End: 2024-06-13

## 2024-06-13 DIAGNOSIS — N92.6 IRREGULAR MENSTRUAL BLEEDING: ICD-10-CM

## 2024-06-13 DIAGNOSIS — I10 ESSENTIAL HYPERTENSION: ICD-10-CM

## 2024-06-13 LAB
ALBUMIN SERPL-MCNC: 4.2 G/DL (ref 3.4–5)
ALBUMIN/GLOB SERPL: 2.1 {RATIO} (ref 1.1–2.2)
ALP SERPL-CCNC: 45 U/L (ref 40–129)
ALT SERPL-CCNC: 14 U/L (ref 10–40)
ANION GAP SERPL CALCULATED.3IONS-SCNC: 8 MMOL/L (ref 3–16)
AST SERPL-CCNC: 25 U/L (ref 15–37)
BILIRUB SERPL-MCNC: 0.5 MG/DL (ref 0–1)
BUN SERPL-MCNC: 8 MG/DL (ref 7–20)
CALCIUM SERPL-MCNC: 9.3 MG/DL (ref 8.3–10.6)
CHLORIDE SERPL-SCNC: 107 MMOL/L (ref 99–110)
CO2 SERPL-SCNC: 25 MMOL/L (ref 21–32)
CREAT SERPL-MCNC: 0.7 MG/DL (ref 0.6–1.1)
DEPRECATED RDW RBC AUTO: 12 % (ref 12.4–15.4)
GFR SERPLBLD CREATININE-BSD FMLA CKD-EPI: >90 ML/MIN/{1.73_M2}
GLUCOSE SERPL-MCNC: 104 MG/DL (ref 70–99)
HCT VFR BLD AUTO: 36.6 % (ref 36–48)
HGB BLD-MCNC: 12.8 G/DL (ref 12–16)
MCH RBC QN AUTO: 31.5 PG (ref 26–34)
MCHC RBC AUTO-ENTMCNC: 35 G/DL (ref 31–36)
MCV RBC AUTO: 90 FL (ref 80–100)
PLATELET # BLD AUTO: 209 K/UL (ref 135–450)
PMV BLD AUTO: 10.4 FL (ref 5–10.5)
POTASSIUM SERPL-SCNC: 3.8 MMOL/L (ref 3.5–5.1)
PROT SERPL-MCNC: 6.2 G/DL (ref 6.4–8.2)
RBC # BLD AUTO: 4.07 M/UL (ref 4–5.2)
SODIUM SERPL-SCNC: 140 MMOL/L (ref 136–145)
TSH SERPL DL<=0.005 MIU/L-ACNC: 1.92 UIU/ML (ref 0.27–4.2)
WBC # BLD AUTO: 5.1 K/UL (ref 4–11)

## 2024-06-13 RX ORDER — AMLODIPINE BESYLATE 2.5 MG/1
2.5 TABLET ORAL DAILY
Qty: 90 TABLET | Refills: 1 | Status: SHIPPED | OUTPATIENT
Start: 2024-06-13

## 2024-06-13 NOTE — TELEPHONE ENCOUNTER
Patient called to clarify orders. She went on mychart and scheduled her imaging. She wanted to make sure she did it correctly. I gave her the number to scheduling to confirm. Also patient went and got her lab work after 230 so she did not do before 10 a.m.         Patient can be reached at [phone]

## 2024-06-13 NOTE — TELEPHONE ENCOUNTER
Medication:   Requested Prescriptions     Pending Prescriptions Disp Refills    amLODIPine (NORVASC) 2.5 MG tablet [Pharmacy Med Name: AMLODIPINE BESYLATE 2.5 MG TAB] 90 tablet 1     Sig: TAKE 1 TABLET BY MOUTH EVERY DAY        Last Filled:  05/10/24    Patient Phone Number: [phone] (home) [phone] (work)    Last appt: 5/10/2024 Return in about 6 months (around 11/10/2024) for Blood Pressure, ADD/ADHD.   Next appt: Visit date not found    Last OARRS:       2/10/2021    12:42 PM   RX Monitoring   Acute Pain Prescriptions Severe pain not adequately treated with lower dose.

## 2024-06-14 LAB
DHEA-S SERPL-MCNC: 220 UG/DL (ref 98.8–340)
TESTOST SERPL-MCNC: 39 NG/DL (ref 8–48)

## 2024-06-16 NOTE — PROGRESS NOTES
Discharge instructions reviewed with pt and family. Verbalized understanding. Continue to monitor. No

## 2024-07-05 ENCOUNTER — OFFICE VISIT (OUTPATIENT)
Dept: PRIMARY CARE CLINIC | Age: 34
End: 2024-07-05
Payer: COMMERCIAL

## 2024-07-05 VITALS
DIASTOLIC BLOOD PRESSURE: 79 MMHG | HEIGHT: 64 IN | SYSTOLIC BLOOD PRESSURE: 130 MMHG | HEART RATE: 80 BPM | BODY MASS INDEX: 21.51 KG/M2 | WEIGHT: 126 LBS | TEMPERATURE: 97.5 F | OXYGEN SATURATION: 100 %

## 2024-07-05 DIAGNOSIS — I10 ESSENTIAL HYPERTENSION: ICD-10-CM

## 2024-07-05 DIAGNOSIS — F90.0 ADHD, PREDOMINANTLY INATTENTIVE TYPE: ICD-10-CM

## 2024-07-05 PROBLEM — B18.2 CHRONIC HEPATITIS C WITHOUT HEPATIC COMA (HCC): Status: ACTIVE | Noted: 2019-04-17

## 2024-07-05 PROCEDURE — 99214 OFFICE O/P EST MOD 30 MIN: CPT | Performed by: STUDENT IN AN ORGANIZED HEALTH CARE EDUCATION/TRAINING PROGRAM

## 2024-07-05 PROCEDURE — 3078F DIAST BP <80 MM HG: CPT | Performed by: STUDENT IN AN ORGANIZED HEALTH CARE EDUCATION/TRAINING PROGRAM

## 2024-07-05 PROCEDURE — 3075F SYST BP GE 130 - 139MM HG: CPT | Performed by: STUDENT IN AN ORGANIZED HEALTH CARE EDUCATION/TRAINING PROGRAM

## 2024-07-05 RX ORDER — AMLODIPINE BESYLATE 2.5 MG/1
2.5 TABLET ORAL DAILY
Qty: 90 TABLET | Refills: 1 | Status: SHIPPED | OUTPATIENT
Start: 2024-07-05

## 2024-07-05 RX ORDER — VELPATASVIR AND SOFOSBUVIR 100; 400 MG/1; MG/1
TABLET, FILM COATED ORAL
COMMUNITY
Start: 2024-05-26

## 2024-07-05 RX ORDER — DEXTROAMPHETAMINE SACCHARATE, AMPHETAMINE ASPARTATE, DEXTROAMPHETAMINE SULFATE AND AMPHETAMINE SULFATE 5; 5; 5; 5 MG/1; MG/1; MG/1; MG/1
20 TABLET ORAL 2 TIMES DAILY
Qty: 60 TABLET | Refills: 0 | Status: SHIPPED | OUTPATIENT
Start: 2024-07-05 | End: 2024-08-04

## 2024-07-05 SDOH — ECONOMIC STABILITY: FOOD INSECURITY: WITHIN THE PAST 12 MONTHS, YOU WORRIED THAT YOUR FOOD WOULD RUN OUT BEFORE YOU GOT MONEY TO BUY MORE.: NEVER TRUE

## 2024-07-05 SDOH — ECONOMIC STABILITY: INCOME INSECURITY: HOW HARD IS IT FOR YOU TO PAY FOR THE VERY BASICS LIKE FOOD, HOUSING, MEDICAL CARE, AND HEATING?: NOT HARD AT ALL

## 2024-07-05 SDOH — ECONOMIC STABILITY: FOOD INSECURITY: WITHIN THE PAST 12 MONTHS, THE FOOD YOU BOUGHT JUST DIDN'T LAST AND YOU DIDN'T HAVE MONEY TO GET MORE.: NEVER TRUE

## 2024-07-05 ASSESSMENT — ENCOUNTER SYMPTOMS
CHEST TIGHTNESS: 0
SHORTNESS OF BREATH: 0
COUGH: 0

## 2024-07-05 NOTE — PROGRESS NOTES
DO Juan   amLODIPine (NORVASC) 2.5 MG tablet Take 1 tablet by mouth daily Yes Juan Saxena DO   albuterol sulfate HFA (VENTOLIN HFA) 108 (90 Base) MCG/ACT inhaler Inhale 2 puffs into the lungs 4 times daily as needed for Wheezing Yes Juan Saxena DO   cetirizine (ZYRTEC) 10 MG tablet Take 1 tablet by mouth daily Yes Provider, Historical, MD        Social History     Tobacco Use    Smoking status: Every Day     Current packs/day: 1.00     Average packs/day: 1 pack/day for 12.0 years (12.0 ttl pk-yrs)     Types: Cigarettes    Smokeless tobacco: Never   Substance Use Topics    Alcohol use: Yes     Alcohol/week: 2.0 standard drinks of alcohol     Types: 2 Standard drinks or equivalent per week     Comment: Previously engaged in heavier consumption        Vitals:    07/05/24 0830   BP: 130/79   Pulse: 80   Temp: 97.5 °F (36.4 °C)   TempSrc: Temporal   SpO2: 100%   Weight: 57.2 kg (126 lb)   Height: 1.626 m (5' 4\")     Estimated body mass index is 21.63 kg/m² as calculated from the following:    Height as of this encounter: 1.626 m (5' 4\").    Weight as of this encounter: 57.2 kg (126 lb).    Physical Exam  Vitals reviewed.   Constitutional:       Appearance: Normal appearance.   HENT:      Head: Normocephalic and atraumatic.      Nose: Nose normal.      Mouth/Throat:      Pharynx: Oropharynx is clear.   Eyes:      Conjunctiva/sclera: Conjunctivae normal.   Cardiovascular:      Rate and Rhythm: Normal rate and regular rhythm.   Pulmonary:      Effort: Pulmonary effort is normal.      Breath sounds: Normal breath sounds.   Skin:     General: Skin is warm and dry.      Capillary Refill: Capillary refill takes less than 2 seconds.   Neurological:      Mental Status: She is alert. Mental status is at baseline.   Psychiatric:         Mood and Affect: Mood normal.         Behavior: Behavior normal.         Thought Content: Thought content normal.         Judgment: Judgment normal.       ASSESSMENT/PLAN:  1. ADHD, predominantly

## 2024-08-05 DIAGNOSIS — F90.0 ADHD, PREDOMINANTLY INATTENTIVE TYPE: ICD-10-CM

## 2024-08-05 RX ORDER — DEXTROAMPHETAMINE SACCHARATE, AMPHETAMINE ASPARTATE, DEXTROAMPHETAMINE SULFATE AND AMPHETAMINE SULFATE 5; 5; 5; 5 MG/1; MG/1; MG/1; MG/1
20 TABLET ORAL 2 TIMES DAILY
Qty: 60 TABLET | Refills: 0 | Status: SHIPPED | OUTPATIENT
Start: 2024-08-05 | End: 2024-09-04

## 2024-08-05 NOTE — TELEPHONE ENCOUNTER
Medication:   Requested Prescriptions     Pending Prescriptions Disp Refills    amphetamine-dextroamphetamine (ADDERALL, 20MG,) 20 MG tablet 60 tablet 0     Sig: Take 1 tablet by mouth 2 times daily at 0800 and 1400 for 30 days. Max Daily Amount: 40 mg        Last Filled:  07/05/24    Patient Phone Number: [phone] (home) [phone] (work)    Last appt: 7/5/2024 Return in about 6 months (around 1/5/2025) for Blood Pressure, ADD/ADHD.   Next appt: 1/9/2025    Last OARRS:       2/10/2021    12:42 PM   RX Monitoring   Acute Pain Prescriptions Severe pain not adequately treated with lower dose.

## 2024-09-08 DIAGNOSIS — F90.0 ADHD, PREDOMINANTLY INATTENTIVE TYPE: ICD-10-CM

## 2024-09-09 RX ORDER — DEXTROAMPHETAMINE SACCHARATE, AMPHETAMINE ASPARTATE, DEXTROAMPHETAMINE SULFATE AND AMPHETAMINE SULFATE 5; 5; 5; 5 MG/1; MG/1; MG/1; MG/1
20 TABLET ORAL 2 TIMES DAILY
Qty: 60 TABLET | Refills: 0 | Status: SHIPPED | OUTPATIENT
Start: 2024-09-09 | End: 2024-10-09

## 2024-10-28 DIAGNOSIS — F90.0 ADHD, PREDOMINANTLY INATTENTIVE TYPE: ICD-10-CM

## 2024-10-28 RX ORDER — DEXTROAMPHETAMINE SACCHARATE, AMPHETAMINE ASPARTATE, DEXTROAMPHETAMINE SULFATE AND AMPHETAMINE SULFATE 5; 5; 5; 5 MG/1; MG/1; MG/1; MG/1
20 TABLET ORAL 2 TIMES DAILY
Qty: 60 TABLET | Refills: 0 | Status: SHIPPED | OUTPATIENT
Start: 2024-10-28 | End: 2024-11-27

## 2024-10-28 NOTE — TELEPHONE ENCOUNTER
Medication:   Requested Prescriptions     Pending Prescriptions Disp Refills    amphetamine-dextroamphetamine (ADDERALL, 20MG,) 20 MG tablet 60 tablet 0     Sig: Take 1 tablet by mouth 2 times daily at 0800 and 1400 for 30 days. Max Daily Amount: 40 mg        Last Filled: 09/09/24     Patient Phone Number: [phone] (home) [phone] (work)    Last appt: 7/5/2024 Return in about 6 months (around 1/5/2025) for Blood Pressure, ADD/ADHD.   Next appt: 1/9/2025    Last OARRS:       2/10/2021    12:42 PM   RX Monitoring   Acute Pain Prescriptions Severe pain not adequately treated with lower dose.

## 2024-11-25 ENCOUNTER — OFFICE VISIT (OUTPATIENT)
Dept: PRIMARY CARE CLINIC | Age: 34
End: 2024-11-25

## 2024-11-25 VITALS
TEMPERATURE: 99 F | HEART RATE: 98 BPM | WEIGHT: 122 LBS | HEIGHT: 64 IN | BODY MASS INDEX: 20.83 KG/M2 | SYSTOLIC BLOOD PRESSURE: 132 MMHG | DIASTOLIC BLOOD PRESSURE: 79 MMHG

## 2024-11-25 DIAGNOSIS — F41.1 GENERALIZED ANXIETY DISORDER: Primary | ICD-10-CM

## 2024-11-25 PROCEDURE — 3078F DIAST BP <80 MM HG: CPT | Performed by: STUDENT IN AN ORGANIZED HEALTH CARE EDUCATION/TRAINING PROGRAM

## 2024-11-25 PROCEDURE — 99214 OFFICE O/P EST MOD 30 MIN: CPT | Performed by: STUDENT IN AN ORGANIZED HEALTH CARE EDUCATION/TRAINING PROGRAM

## 2024-11-25 PROCEDURE — 3075F SYST BP GE 130 - 139MM HG: CPT | Performed by: STUDENT IN AN ORGANIZED HEALTH CARE EDUCATION/TRAINING PROGRAM

## 2024-11-25 RX ORDER — CLONAZEPAM 0.5 MG/1
0.5 TABLET ORAL 2 TIMES DAILY PRN
Qty: 25 TABLET | Refills: 0 | Status: SHIPPED | OUTPATIENT
Start: 2024-11-25 | End: 2024-12-25

## 2024-11-25 SDOH — ECONOMIC STABILITY: FOOD INSECURITY: WITHIN THE PAST 12 MONTHS, THE FOOD YOU BOUGHT JUST DIDN'T LAST AND YOU DIDN'T HAVE MONEY TO GET MORE.: NEVER TRUE

## 2024-11-25 SDOH — ECONOMIC STABILITY: INCOME INSECURITY: HOW HARD IS IT FOR YOU TO PAY FOR THE VERY BASICS LIKE FOOD, HOUSING, MEDICAL CARE, AND HEATING?: NOT HARD AT ALL

## 2024-11-25 SDOH — ECONOMIC STABILITY: FOOD INSECURITY: WITHIN THE PAST 12 MONTHS, YOU WORRIED THAT YOUR FOOD WOULD RUN OUT BEFORE YOU GOT MONEY TO BUY MORE.: NEVER TRUE

## 2024-11-25 ASSESSMENT — PATIENT HEALTH QUESTIONNAIRE - PHQ9
4. FEELING TIRED OR HAVING LITTLE ENERGY: NOT AT ALL
7. TROUBLE CONCENTRATING ON THINGS, SUCH AS READING THE NEWSPAPER OR WATCHING TELEVISION: NOT AT ALL
1. LITTLE INTEREST OR PLEASURE IN DOING THINGS: SEVERAL DAYS
SUM OF ALL RESPONSES TO PHQ9 QUESTIONS 1 & 2: 1
9. THOUGHTS THAT YOU WOULD BE BETTER OFF DEAD, OR OF HURTING YOURSELF: NOT AT ALL
6. FEELING BAD ABOUT YOURSELF - OR THAT YOU ARE A FAILURE OR HAVE LET YOURSELF OR YOUR FAMILY DOWN: NOT AT ALL
5. POOR APPETITE OR OVEREATING: NOT AT ALL
3. TROUBLE FALLING OR STAYING ASLEEP: NOT AT ALL
SUM OF ALL RESPONSES TO PHQ QUESTIONS 1-9: 1
10. IF YOU CHECKED OFF ANY PROBLEMS, HOW DIFFICULT HAVE THESE PROBLEMS MADE IT FOR YOU TO DO YOUR WORK, TAKE CARE OF THINGS AT HOME, OR GET ALONG WITH OTHER PEOPLE: NOT DIFFICULT AT ALL
SUM OF ALL RESPONSES TO PHQ QUESTIONS 1-9: 1
SUM OF ALL RESPONSES TO PHQ QUESTIONS 1-9: 1
8. MOVING OR SPEAKING SO SLOWLY THAT OTHER PEOPLE COULD HAVE NOTICED. OR THE OPPOSITE, BEING SO FIGETY OR RESTLESS THAT YOU HAVE BEEN MOVING AROUND A LOT MORE THAN USUAL: NOT AT ALL
2. FEELING DOWN, DEPRESSED OR HOPELESS: NOT AT ALL
SUM OF ALL RESPONSES TO PHQ QUESTIONS 1-9: 1

## 2024-11-25 ASSESSMENT — ENCOUNTER SYMPTOMS
NAUSEA: 0
VOMITING: 0

## 2024-11-25 ASSESSMENT — ANXIETY QUESTIONNAIRES
1. FEELING NERVOUS, ANXIOUS, OR ON EDGE: NEARLY EVERY DAY
5. BEING SO RESTLESS THAT IT IS HARD TO SIT STILL: NEARLY EVERY DAY
6. BECOMING EASILY ANNOYED OR IRRITABLE: NEARLY EVERY DAY
3. WORRYING TOO MUCH ABOUT DIFFERENT THINGS: NEARLY EVERY DAY
7. FEELING AFRAID AS IF SOMETHING AWFUL MIGHT HAPPEN: NEARLY EVERY DAY
GAD7 TOTAL SCORE: 21
IF YOU CHECKED OFF ANY PROBLEMS ON THIS QUESTIONNAIRE, HOW DIFFICULT HAVE THESE PROBLEMS MADE IT FOR YOU TO DO YOUR WORK, TAKE CARE OF THINGS AT HOME, OR GET ALONG WITH OTHER PEOPLE: EXTREMELY DIFFICULT
2. NOT BEING ABLE TO STOP OR CONTROL WORRYING: NEARLY EVERY DAY
4. TROUBLE RELAXING: NEARLY EVERY DAY

## 2024-11-25 NOTE — PROGRESS NOTES
difficult to treat anxiety.  Follow-up in 1 month to make sure she is doing better.  - clonazePAM (KLONOPIN) 0.5 MG tablet; Take 1 tablet by mouth 2 times daily as needed for Anxiety for up to 30 days. Max Daily Amount: 1 mg  Dispense: 25 tablet; Refill: 0      Return in about 4 weeks (around 12/23/2024) for Depression/Anxiety.    An electronic signature was used to authenticate this note.    --Juan Saxena,  on 11/25/2024 at 4:36 PM

## 2024-11-29 DIAGNOSIS — F90.0 ADHD, PREDOMINANTLY INATTENTIVE TYPE: ICD-10-CM

## 2024-12-02 RX ORDER — DEXTROAMPHETAMINE SACCHARATE, AMPHETAMINE ASPARTATE, DEXTROAMPHETAMINE SULFATE AND AMPHETAMINE SULFATE 5; 5; 5; 5 MG/1; MG/1; MG/1; MG/1
20 TABLET ORAL 2 TIMES DAILY
Qty: 60 TABLET | Refills: 0 | Status: SHIPPED | OUTPATIENT
Start: 2024-12-02 | End: 2025-01-01

## 2024-12-02 NOTE — TELEPHONE ENCOUNTER
Medication:   Requested Prescriptions     Pending Prescriptions Disp Refills    amphetamine-dextroamphetamine (ADDERALL, 20MG,) 20 MG tablet 60 tablet 0     Sig: Take 1 tablet by mouth 2 times daily at 0800 and 1400 for 30 days. Max Daily Amount: 40 mg        Last Filled:  10/28/24    Patient Phone Number: [phone] (home) [phone] (work)    Last appt: 11/25/2024   Next appt: 12/23/2024    Last OARRS:       2/10/2021    12:42 PM   RX Monitoring   Acute Pain Prescriptions Severe pain not adequately treated with lower dose.

## 2024-12-16 DIAGNOSIS — F41.1 GENERALIZED ANXIETY DISORDER: ICD-10-CM

## 2024-12-16 RX ORDER — CLONAZEPAM 0.5 MG/1
0.5 TABLET ORAL 2 TIMES DAILY PRN
Qty: 25 TABLET | Refills: 0 | Status: SHIPPED | OUTPATIENT
Start: 2024-12-16 | End: 2025-01-15

## 2024-12-16 NOTE — TELEPHONE ENCOUNTER
Medication:   Requested Prescriptions     Pending Prescriptions Disp Refills    clonazePAM (KLONOPIN) 0.5 MG tablet 25 tablet 0     Sig: Take 1 tablet by mouth 2 times daily as needed for Anxiety for up to 30 days. Max Daily Amount: 1 mg        Last Filled:  11/25/24    Patient Phone Number: [phone] (home) [phone] (work)    Last appt: 11/25/2024   Next appt: 12/23/2024    Last OARRS:       2/10/2021    12:42 PM   RX Monitoring   Acute Pain Prescriptions Severe pain not adequately treated with lower dose.

## 2025-01-09 ENCOUNTER — OFFICE VISIT (OUTPATIENT)
Dept: PRIMARY CARE CLINIC | Age: 35
End: 2025-01-09
Payer: COMMERCIAL

## 2025-01-09 VITALS
TEMPERATURE: 98.1 F | BODY MASS INDEX: 21.31 KG/M2 | HEIGHT: 64 IN | WEIGHT: 124.8 LBS | HEART RATE: 89 BPM | SYSTOLIC BLOOD PRESSURE: 130 MMHG | DIASTOLIC BLOOD PRESSURE: 80 MMHG

## 2025-01-09 DIAGNOSIS — I10 ESSENTIAL HYPERTENSION: ICD-10-CM

## 2025-01-09 DIAGNOSIS — R06.02 SHORTNESS OF BREATH: ICD-10-CM

## 2025-01-09 DIAGNOSIS — F33.2 SEVERE EPISODE OF RECURRENT MAJOR DEPRESSIVE DISORDER, WITHOUT PSYCHOTIC FEATURES (HCC): ICD-10-CM

## 2025-01-09 DIAGNOSIS — F90.0 ADHD, PREDOMINANTLY INATTENTIVE TYPE: Primary | ICD-10-CM

## 2025-01-09 DIAGNOSIS — F41.1 GENERALIZED ANXIETY DISORDER: ICD-10-CM

## 2025-01-09 PROCEDURE — 99214 OFFICE O/P EST MOD 30 MIN: CPT | Performed by: STUDENT IN AN ORGANIZED HEALTH CARE EDUCATION/TRAINING PROGRAM

## 2025-01-09 PROCEDURE — 3075F SYST BP GE 130 - 139MM HG: CPT | Performed by: STUDENT IN AN ORGANIZED HEALTH CARE EDUCATION/TRAINING PROGRAM

## 2025-01-09 PROCEDURE — 3079F DIAST BP 80-89 MM HG: CPT | Performed by: STUDENT IN AN ORGANIZED HEALTH CARE EDUCATION/TRAINING PROGRAM

## 2025-01-09 RX ORDER — CLONAZEPAM 0.5 MG/1
0.5 TABLET ORAL 2 TIMES DAILY PRN
Qty: 25 TABLET | Refills: 0 | Status: SHIPPED | OUTPATIENT
Start: 2025-01-09 | End: 2025-02-08

## 2025-01-09 RX ORDER — ALBUTEROL SULFATE 90 UG/1
2 INHALANT RESPIRATORY (INHALATION) 4 TIMES DAILY PRN
Qty: 54 G | Refills: 1 | Status: SHIPPED | OUTPATIENT
Start: 2025-01-09

## 2025-01-09 RX ORDER — AMLODIPINE BESYLATE 2.5 MG/1
2.5 TABLET ORAL DAILY
Qty: 90 TABLET | Refills: 1 | Status: SHIPPED | OUTPATIENT
Start: 2025-01-09

## 2025-01-09 RX ORDER — DEXTROAMPHETAMINE SACCHARATE, AMPHETAMINE ASPARTATE, DEXTROAMPHETAMINE SULFATE AND AMPHETAMINE SULFATE 5; 5; 5; 5 MG/1; MG/1; MG/1; MG/1
20 TABLET ORAL 2 TIMES DAILY
Qty: 60 TABLET | Refills: 0 | Status: SHIPPED | OUTPATIENT
Start: 2025-01-09 | End: 2025-02-08

## 2025-01-09 SDOH — ECONOMIC STABILITY: FOOD INSECURITY: WITHIN THE PAST 12 MONTHS, YOU WORRIED THAT YOUR FOOD WOULD RUN OUT BEFORE YOU GOT MONEY TO BUY MORE.: NEVER TRUE

## 2025-01-09 SDOH — ECONOMIC STABILITY: FOOD INSECURITY: WITHIN THE PAST 12 MONTHS, THE FOOD YOU BOUGHT JUST DIDN'T LAST AND YOU DIDN'T HAVE MONEY TO GET MORE.: NEVER TRUE

## 2025-01-09 ASSESSMENT — ENCOUNTER SYMPTOMS
SHORTNESS OF BREATH: 0
CHEST TIGHTNESS: 0
COUGH: 0

## 2025-01-09 ASSESSMENT — PATIENT HEALTH QUESTIONNAIRE - PHQ9
7. TROUBLE CONCENTRATING ON THINGS, SUCH AS READING THE NEWSPAPER OR WATCHING TELEVISION: NOT AT ALL
6. FEELING BAD ABOUT YOURSELF - OR THAT YOU ARE A FAILURE OR HAVE LET YOURSELF OR YOUR FAMILY DOWN: NOT AT ALL
9. THOUGHTS THAT YOU WOULD BE BETTER OFF DEAD, OR OF HURTING YOURSELF: NOT AT ALL
SUM OF ALL RESPONSES TO PHQ QUESTIONS 1-9: 1
4. FEELING TIRED OR HAVING LITTLE ENERGY: NOT AT ALL
2. FEELING DOWN, DEPRESSED OR HOPELESS: NOT AT ALL
SUM OF ALL RESPONSES TO PHQ QUESTIONS 1-9: 1
10. IF YOU CHECKED OFF ANY PROBLEMS, HOW DIFFICULT HAVE THESE PROBLEMS MADE IT FOR YOU TO DO YOUR WORK, TAKE CARE OF THINGS AT HOME, OR GET ALONG WITH OTHER PEOPLE: NOT DIFFICULT AT ALL
SUM OF ALL RESPONSES TO PHQ QUESTIONS 1-9: 1
SUM OF ALL RESPONSES TO PHQ QUESTIONS 1-9: 1
3. TROUBLE FALLING OR STAYING ASLEEP: SEVERAL DAYS
8. MOVING OR SPEAKING SO SLOWLY THAT OTHER PEOPLE COULD HAVE NOTICED. OR THE OPPOSITE, BEING SO FIGETY OR RESTLESS THAT YOU HAVE BEEN MOVING AROUND A LOT MORE THAN USUAL: NOT AT ALL
SUM OF ALL RESPONSES TO PHQ9 QUESTIONS 1 & 2: 0
1. LITTLE INTEREST OR PLEASURE IN DOING THINGS: NOT AT ALL
5. POOR APPETITE OR OVEREATING: NOT AT ALL

## 2025-01-09 ASSESSMENT — ANXIETY QUESTIONNAIRES
7. FEELING AFRAID AS IF SOMETHING AWFUL MIGHT HAPPEN: NOT AT ALL
IF YOU CHECKED OFF ANY PROBLEMS ON THIS QUESTIONNAIRE, HOW DIFFICULT HAVE THESE PROBLEMS MADE IT FOR YOU TO DO YOUR WORK, TAKE CARE OF THINGS AT HOME, OR GET ALONG WITH OTHER PEOPLE: SOMEWHAT DIFFICULT
3. WORRYING TOO MUCH ABOUT DIFFERENT THINGS: SEVERAL DAYS
2. NOT BEING ABLE TO STOP OR CONTROL WORRYING: NOT AT ALL
4. TROUBLE RELAXING: SEVERAL DAYS
5. BEING SO RESTLESS THAT IT IS HARD TO SIT STILL: SEVERAL DAYS
6. BECOMING EASILY ANNOYED OR IRRITABLE: SEVERAL DAYS
GAD7 TOTAL SCORE: 5
1. FEELING NERVOUS, ANXIOUS, OR ON EDGE: SEVERAL DAYS

## 2025-01-09 NOTE — PROGRESS NOTES
2025     Cyn Mix (:  1990) is a 34 y.o. female, here for evaluation of the following medical concerns:    HPI  ADD/ADHD:  Current treatment: Adderall- 20 BID, which has been effective.  Residual symptoms: none. Medication side effects: None. Patient denies anorexia, nausea, vomiting, abdominal pain, involuntary weight loss, palpitations, insomnia, irritability, anxiety, headache, and tremor.    Hypertension:  Home blood pressure monitoring: No.  She is not adherent to a low sodium diet. Patient denies chest pain, shortness of breath, headache, lightheadedness, blurred vision, peripheral edema, palpitations, dry cough, and fatigue.  Antihypertensive medication side effects: no medication side effects noted.  Use of agents associated with hypertension: none.                                        Sodium (mmol/L)   Date Value   2024 140    BUN (mg/dL)   Date Value   2024 8    Glucose (mg/dL)   Date Value   2024 104 (H)      Potassium (mmol/L)   Date Value   2024 3.8     Potassium reflex Magnesium (mmol/L)   Date Value   02/10/2021 4.0    Creatinine (mg/dL)   Date Value   2024 0.7           Review of Systems   Constitutional:  Negative for fatigue.   Eyes:  Negative for visual disturbance.   Respiratory:  Negative for cough, chest tightness and shortness of breath.    Cardiovascular:  Negative for chest pain, palpitations and leg swelling.   Endocrine: Negative for polydipsia, polyphagia and polyuria.   Genitourinary:  Negative for frequency.   Skin:  Negative for rash.   Neurological:  Negative for dizziness, syncope, weakness and light-headedness.       Prior to Visit Medications    Medication Sig Taking? Authorizing Provider   amphetamine-dextroamphetamine (ADDERALL, 20MG,) 20 MG tablet Take 1 tablet by mouth 2 times daily at 0800 and 1400 for 30 days. Max Daily Amount: 40 mg Yes Juan Saxena DO   clonazePAM (KLONOPIN) 0.5 MG tablet Take 1 tablet by mouth 2

## 2025-01-27 DIAGNOSIS — F41.1 GENERALIZED ANXIETY DISORDER: ICD-10-CM

## 2025-01-27 RX ORDER — CLONAZEPAM 0.5 MG/1
0.5 TABLET ORAL 2 TIMES DAILY PRN
Qty: 25 TABLET | Refills: 0 | Status: SHIPPED | OUTPATIENT
Start: 2025-01-27 | End: 2025-02-26

## 2025-01-27 NOTE — TELEPHONE ENCOUNTER
Medication:   Requested Prescriptions     Pending Prescriptions Disp Refills    clonazePAM (KLONOPIN) 0.5 MG tablet 25 tablet 0     Sig: Take 1 tablet by mouth 2 times daily as needed for Anxiety for up to 30 days. Max Daily Amount: 1 mg        Last Filled:  1/9/25    Patient Phone Number: [phone] (home) [phone] (work)    Last appt: 1/9/2025   Next appt: 7/9/2025    Last OARRS:       2/10/2021    12:42 PM   RX Monitoring   Acute Pain Prescriptions Severe pain not adequately treated with lower dose.

## (undated) DEVICE — DRAPE,UNDERBUTTOCKS,PCH,STERILE: Brand: MEDLINE

## (undated) DEVICE — COVER LT HNDL BLU PLAS

## (undated) DEVICE — MERCY HEALTH WEST TURNOVER: Brand: MEDLINE INDUSTRIES, INC.

## (undated) DEVICE — SUTURE VCRL SZ 3-0 L27IN ABSRB UD L26MM SH 1/2 CIR J416H

## (undated) DEVICE — PAD SANITARY MTRN TAB BELT WRP NS 11IN

## (undated) DEVICE — PAD,NON-ADHERENT,3X8,STERILE,LF,1/PK: Brand: MEDLINE

## (undated) DEVICE — TROCAR: Brand: KII SHIELDED BLADED ACCESS SYSTEM

## (undated) DEVICE — SEALER ENDOSCP L37CM NANO COAT BLNT TIP LAP DIV

## (undated) DEVICE — GOWN SIRUS NONREIN LG W/TWL: Brand: MEDLINE INDUSTRIES, INC.

## (undated) DEVICE — SUTURE SZ 0 27IN 5/8 CIR UR-6  TAPER PT VIOLET ABSRB VICRYL J603H

## (undated) DEVICE — SOLUTION IV 1000ML 0.9% SOD CHL FOR IRRIG PLAS CONT

## (undated) DEVICE — SOLUTION IV IRRIG POUR BRL 0.9% SODIUM CHL 2F7124

## (undated) DEVICE — TROCAR: Brand: KII SLEEVE

## (undated) DEVICE — ELECTRODE PT RET AD L9FT HI MOIST COND ADH HYDRGEL CORDED

## (undated) DEVICE — INSTRUMENT REPROC SEAL/DIVIDE LAP BLUNT TP LIGASURE NANO-COAT 5MMX37CM

## (undated) DEVICE — CANISTER, RIGID, 1200CC: Brand: MEDLINE INDUSTRIES, INC.

## (undated) DEVICE — GAUZE,SPONGE,2"X2",8PLY,STERILE,LF,2'S: Brand: MEDLINE

## (undated) DEVICE — STRIP,CLOSURE,WOUND,MEDI-STRIP,1/2X4: Brand: MEDLINE

## (undated) DEVICE — Z DISCONTINUED BY MEDLINE USE 2711682 TRAY SKIN PREP DRY W/ PREM GLV

## (undated) DEVICE — DRAPE,LAP,CHOLE,W/TROUGHS,STERILE: Brand: MEDLINE

## (undated) DEVICE — TOTAL TRAY, 16FR 10ML SIL FOLEY, URN: Brand: MEDLINE

## (undated) DEVICE — TISSUE RETRIEVAL SYSTEM: Brand: INZII RETRIEVAL SYSTEM

## (undated) DEVICE — SOLUTION SCRB 4OZ 10% POVIDONE IOD ANTIMIC BTL

## (undated) DEVICE — SOLUTION PREP POVIDONE IOD FOR SKIN MUCOUS MEM PRIOR TO

## (undated) DEVICE — Z DISCONTINUED USE 2270995 CATHETER URETH 16FR L16IN RED RUB INTMIT RND HLLW TIP 2 OPP

## (undated) DEVICE — TROCAR: Brand: KII FIOS FIRST ENTRY

## (undated) DEVICE — GLOVE SURG SZ 7 CRM LTX FREE POLYISOPRENE POLYMER BEAD ANTI

## (undated) DEVICE — Device

## (undated) DEVICE — UNDERPAD INCONT XL MESH PROTCT + DISP FOR MAT USE

## (undated) DEVICE — APPLICATOR ST RAYON TIP

## (undated) DEVICE — NEEDLE HYPO 25GA L1.5IN BVL ORIENTED ECLIPSE

## (undated) DEVICE — LAPAROSCOPY PK

## (undated) DEVICE — [HIGH FLOW INSUFFLATOR,  DO NOT USE IF PACKAGE IS DAMAGED,  KEEP DRY,  KEEP AWAY FROM SUNLIGHT,  PROTECT FROM HEAT AND RADIOACTIVE SOURCES.]: Brand: PNEUMOSURE

## (undated) DEVICE — DRAPE LAP 104X35X124IN BLU CTRL + FAB REINF ABD FEN

## (undated) DEVICE — BANDAGE PATCH BANDAID 1.5X1.5IN

## (undated) DEVICE — TROCAR: Brand: KII® SHEILDED BLADED ACCESS SYSEM

## (undated) DEVICE — MASTISOL ADHESIVE LIQ 2/3ML

## (undated) DEVICE — SYRINGE MED 50ML LUERLOCK TIP

## (undated) DEVICE — INVIEW CLEAR LEGGINGS: Brand: CONVERTORS

## (undated) DEVICE — APPLICATOR MEDICATED 26 CC SOLUTION HI LT ORNG CHLORAPREP